# Patient Record
Sex: FEMALE | Race: WHITE | Employment: OTHER
[De-identification: names, ages, dates, MRNs, and addresses within clinical notes are randomized per-mention and may not be internally consistent; named-entity substitution may affect disease eponyms.]

---

## 2017-01-04 RX ORDER — HYDROCODONE BITARTRATE AND ACETAMINOPHEN 5; 325 MG/1; MG/1
1 TABLET ORAL EVERY 6 HOURS PRN
Qty: 40 TABLET | Refills: 0 | Status: SHIPPED | OUTPATIENT
Start: 2017-01-04 | End: 2017-01-17 | Stop reason: SDUPTHER

## 2017-01-05 ENCOUNTER — CARE COORDINATION (OUTPATIENT)
Dept: CASE MANAGEMENT | Age: 52
End: 2017-01-05

## 2017-01-12 ENCOUNTER — CARE COORDINATION (OUTPATIENT)
Dept: CASE MANAGEMENT | Age: 52
End: 2017-01-12

## 2017-01-17 RX ORDER — HYDROCODONE BITARTRATE AND ACETAMINOPHEN 5; 325 MG/1; MG/1
1 TABLET ORAL EVERY 6 HOURS PRN
Qty: 40 TABLET | Refills: 0 | Status: SHIPPED | OUTPATIENT
Start: 2017-01-17 | End: 2017-01-24

## 2017-01-18 ENCOUNTER — CARE COORDINATION (OUTPATIENT)
Dept: CASE MANAGEMENT | Age: 52
End: 2017-01-18

## 2017-01-26 ENCOUNTER — CARE COORDINATION (OUTPATIENT)
Dept: CASE MANAGEMENT | Age: 52
End: 2017-01-26

## 2017-02-02 ENCOUNTER — CARE COORDINATION (OUTPATIENT)
Dept: CASE MANAGEMENT | Age: 52
End: 2017-02-02

## 2017-02-08 ENCOUNTER — TELEPHONE (OUTPATIENT)
Dept: ORTHOPEDIC SURGERY | Age: 52
End: 2017-02-08

## 2017-02-09 ENCOUNTER — OFFICE VISIT (OUTPATIENT)
Dept: ORTHOPEDIC SURGERY | Age: 52
End: 2017-02-09

## 2017-02-09 VITALS
SYSTOLIC BLOOD PRESSURE: 130 MMHG | HEIGHT: 64 IN | HEART RATE: 78 BPM | BODY MASS INDEX: 36.55 KG/M2 | DIASTOLIC BLOOD PRESSURE: 78 MMHG | WEIGHT: 214.07 LBS

## 2017-02-09 DIAGNOSIS — M25.561 ACUTE PAIN OF RIGHT KNEE: ICD-10-CM

## 2017-02-09 DIAGNOSIS — Z96.651 S/P RIGHT UNICOMPARTMENTAL KNEE REPLACEMENT: Primary | ICD-10-CM

## 2017-02-09 PROCEDURE — 99024 POSTOP FOLLOW-UP VISIT: CPT | Performed by: ORTHOPAEDIC SURGERY

## 2017-02-09 PROCEDURE — 73560 X-RAY EXAM OF KNEE 1 OR 2: CPT | Performed by: ORTHOPAEDIC SURGERY

## 2017-02-09 RX ORDER — LITHIUM CARBONATE 300 MG/1
300 CAPSULE ORAL 2 TIMES DAILY WITH MEALS
COMMUNITY
Start: 2017-02-02 | End: 2017-10-19

## 2017-02-09 RX ORDER — OLANZAPINE 20 MG/1
TABLET ORAL NIGHTLY
COMMUNITY
Start: 2017-02-02

## 2017-02-09 RX ORDER — ONDANSETRON 4 MG/1
TABLET, FILM COATED ORAL
COMMUNITY
Start: 2017-01-17 | End: 2017-02-13

## 2017-02-09 RX ORDER — GABAPENTIN 400 MG/1
800 CAPSULE ORAL 3 TIMES DAILY
COMMUNITY
Start: 2017-02-02

## 2017-02-09 RX ORDER — OXYCODONE HYDROCHLORIDE AND ACETAMINOPHEN 5; 325 MG/1; MG/1
1 TABLET ORAL EVERY 6 HOURS PRN
Qty: 40 TABLET | Refills: 0 | Status: SHIPPED | OUTPATIENT
Start: 2017-02-09 | End: 2017-02-16

## 2017-02-10 ENCOUNTER — TELEPHONE (OUTPATIENT)
Dept: ORTHOPEDIC SURGERY | Age: 52
End: 2017-02-10

## 2017-02-13 ENCOUNTER — CARE COORDINATION (OUTPATIENT)
Dept: CASE MANAGEMENT | Age: 52
End: 2017-02-13

## 2017-02-21 ENCOUNTER — CARE COORDINATION (OUTPATIENT)
Dept: CASE MANAGEMENT | Age: 52
End: 2017-02-21

## 2017-02-21 DIAGNOSIS — Z96.651 STATUS POST RIGHT PARTIAL KNEE REPLACEMENT: ICD-10-CM

## 2017-02-23 ENCOUNTER — CARE COORDINATION (OUTPATIENT)
Dept: CASE MANAGEMENT | Age: 52
End: 2017-02-23

## 2017-02-23 DIAGNOSIS — Z96.651 STATUS POST RIGHT PARTIAL KNEE REPLACEMENT: ICD-10-CM

## 2017-02-27 ENCOUNTER — CARE COORDINATION (OUTPATIENT)
Dept: CASE MANAGEMENT | Age: 52
End: 2017-02-27

## 2017-02-27 DIAGNOSIS — Z96.651 STATUS POST RIGHT PARTIAL KNEE REPLACEMENT: ICD-10-CM

## 2017-02-27 RX ORDER — OXYCODONE HYDROCHLORIDE 5 MG/1
TABLET ORAL
Qty: 40 TABLET | Refills: 0 | Status: SHIPPED | OUTPATIENT
Start: 2017-02-27 | End: 2017-03-16 | Stop reason: SDUPTHER

## 2017-03-07 ENCOUNTER — HOSPITAL ENCOUNTER (OUTPATIENT)
Dept: PHYSICAL THERAPY | Age: 52
Discharge: HOME | End: 2017-02-28
Admitting: ORTHOPAEDIC SURGERY

## 2017-03-07 ENCOUNTER — OFFICE VISIT (OUTPATIENT)
Dept: ORTHOPEDIC SURGERY | Age: 52
End: 2017-03-07

## 2017-03-07 VITALS
SYSTOLIC BLOOD PRESSURE: 129 MMHG | WEIGHT: 213 LBS | HEART RATE: 83 BPM | BODY MASS INDEX: 36.37 KG/M2 | HEIGHT: 64 IN | DIASTOLIC BLOOD PRESSURE: 77 MMHG

## 2017-03-07 DIAGNOSIS — Z96.651 STATUS POST RIGHT PARTIAL KNEE REPLACEMENT: Primary | ICD-10-CM

## 2017-03-07 DIAGNOSIS — Z98.890 S/P RIGHT KNEE ARTHROSCOPY: ICD-10-CM

## 2017-03-07 PROCEDURE — 99024 POSTOP FOLLOW-UP VISIT: CPT | Performed by: ORTHOPAEDIC SURGERY

## 2017-03-07 RX ORDER — OXYCODONE HYDROCHLORIDE AND ACETAMINOPHEN 5; 325 MG/1; MG/1
1 TABLET ORAL EVERY 6 HOURS PRN
Qty: 40 TABLET | Refills: 0 | Status: SHIPPED | OUTPATIENT
Start: 2017-03-07 | End: 2017-11-02 | Stop reason: SDUPTHER

## 2017-03-16 DIAGNOSIS — Z96.651 STATUS POST RIGHT PARTIAL KNEE REPLACEMENT: Primary | ICD-10-CM

## 2017-03-16 RX ORDER — OXYCODONE HYDROCHLORIDE 5 MG/1
TABLET ORAL
Qty: 40 TABLET | Refills: 0 | Status: SHIPPED | OUTPATIENT
Start: 2017-03-16 | End: 2017-10-19

## 2017-03-27 DIAGNOSIS — Z96.651 STATUS POST RIGHT PARTIAL KNEE REPLACEMENT: Primary | ICD-10-CM

## 2017-03-27 RX ORDER — HYDROCODONE BITARTRATE AND ACETAMINOPHEN 5; 325 MG/1; MG/1
1 TABLET ORAL EVERY 6 HOURS PRN
Qty: 40 TABLET | Refills: 0 | Status: CANCELLED | OUTPATIENT
Start: 2017-03-27 | End: 2017-04-03

## 2017-03-27 RX ORDER — TRAMADOL HYDROCHLORIDE 50 MG/1
50 TABLET ORAL EVERY 6 HOURS PRN
Qty: 40 TABLET | Refills: 0 | Status: SHIPPED | OUTPATIENT
Start: 2017-03-27 | End: 2017-04-06

## 2017-05-09 ENCOUNTER — OFFICE VISIT (OUTPATIENT)
Dept: ORTHOPEDIC SURGERY | Age: 52
End: 2017-05-09

## 2017-05-09 VITALS
SYSTOLIC BLOOD PRESSURE: 135 MMHG | WEIGHT: 212.96 LBS | HEIGHT: 64 IN | DIASTOLIC BLOOD PRESSURE: 92 MMHG | HEART RATE: 92 BPM | BODY MASS INDEX: 36.36 KG/M2

## 2017-05-09 DIAGNOSIS — Z96.651 STATUS POST RIGHT PARTIAL KNEE REPLACEMENT: Primary | ICD-10-CM

## 2017-05-09 PROCEDURE — 99024 POSTOP FOLLOW-UP VISIT: CPT | Performed by: ORTHOPAEDIC SURGERY

## 2017-05-09 PROCEDURE — E0100 CANE ADJUST/FIXED WITH TIP: HCPCS | Performed by: ORTHOPAEDIC SURGERY

## 2017-09-01 ENCOUNTER — TELEPHONE (OUTPATIENT)
Dept: ORTHOPEDIC SURGERY | Age: 52
End: 2017-09-01

## 2017-09-12 ENCOUNTER — OFFICE VISIT (OUTPATIENT)
Dept: ORTHOPEDIC SURGERY | Age: 52
End: 2017-09-12

## 2017-09-12 VITALS
HEIGHT: 64 IN | WEIGHT: 213 LBS | DIASTOLIC BLOOD PRESSURE: 94 MMHG | HEART RATE: 94 BPM | SYSTOLIC BLOOD PRESSURE: 143 MMHG | BODY MASS INDEX: 36.37 KG/M2

## 2017-09-12 DIAGNOSIS — Z96.651 STATUS POST RIGHT PARTIAL KNEE REPLACEMENT: Primary | ICD-10-CM

## 2017-09-12 DIAGNOSIS — M25.562 LEFT KNEE PAIN, UNSPECIFIED CHRONICITY: ICD-10-CM

## 2017-09-12 PROCEDURE — 73564 X-RAY EXAM KNEE 4 OR MORE: CPT | Performed by: ORTHOPAEDIC SURGERY

## 2017-09-12 PROCEDURE — 99214 OFFICE O/P EST MOD 30 MIN: CPT | Performed by: ORTHOPAEDIC SURGERY

## 2017-09-12 PROCEDURE — G8427 DOCREV CUR MEDS BY ELIG CLIN: HCPCS | Performed by: ORTHOPAEDIC SURGERY

## 2017-09-12 PROCEDURE — 1036F TOBACCO NON-USER: CPT | Performed by: ORTHOPAEDIC SURGERY

## 2017-09-12 PROCEDURE — G8417 CALC BMI ABV UP PARAM F/U: HCPCS | Performed by: ORTHOPAEDIC SURGERY

## 2017-09-12 PROCEDURE — 3017F COLORECTAL CA SCREEN DOC REV: CPT | Performed by: ORTHOPAEDIC SURGERY

## 2017-09-21 DIAGNOSIS — M17.12 PRIMARY OSTEOARTHRITIS OF LEFT KNEE: Primary | ICD-10-CM

## 2017-09-22 ENCOUNTER — TELEPHONE (OUTPATIENT)
Dept: ORTHOPEDIC SURGERY | Age: 52
End: 2017-09-22

## 2017-10-04 ENCOUNTER — HOSPITAL ENCOUNTER (OUTPATIENT)
Dept: CT IMAGING | Age: 52
Discharge: HOME | End: 2017-10-04
Attending: ORTHOPAEDIC SURGERY | Admitting: ORTHOPAEDIC SURGERY

## 2017-10-04 DIAGNOSIS — M17.12 UNILATERAL PRIMARY OSTEOARTHRITIS, LEFT KNEE: ICD-10-CM

## 2017-10-04 DIAGNOSIS — M17.12 PRIMARY OSTEOARTHRITIS OF LEFT KNEE: ICD-10-CM

## 2017-10-27 PROBLEM — K21.9 GERD (GASTROESOPHAGEAL REFLUX DISEASE): Status: ACTIVE | Noted: 2017-10-27

## 2017-10-27 PROBLEM — Z96.652 STATUS POST LEFT PARTIAL KNEE REPLACEMENT: Status: ACTIVE | Noted: 2017-10-27

## 2017-10-29 ENCOUNTER — CARE COORDINATION (OUTPATIENT)
Dept: CASE MANAGEMENT | Age: 52
End: 2017-10-29

## 2017-11-02 DIAGNOSIS — M17.12 PRIMARY OSTEOARTHRITIS OF LEFT KNEE: Primary | ICD-10-CM

## 2017-11-02 RX ORDER — OXYCODONE HYDROCHLORIDE AND ACETAMINOPHEN 5; 325 MG/1; MG/1
1 TABLET ORAL EVERY 6 HOURS PRN
Qty: 28 TABLET | Refills: 0 | Status: SHIPPED | OUTPATIENT
Start: 2017-11-02 | End: 2017-11-09

## 2017-11-03 ENCOUNTER — CARE COORDINATION (OUTPATIENT)
Dept: CASE MANAGEMENT | Age: 52
End: 2017-11-03

## 2017-11-03 NOTE — CARE COORDINATION
Spoke with patient's spouse, states that she just left    Incision status: States no issues with incision that he is aware of     Edema/Swelling: States no issues with swelling    Pain level and status: States she is still having pain    Use of pain medications: States she is taking pain meds    Bowels: NA    Home Care Agency active: Continues    Outpatient therapy: No    Do you have all of your medications: Unsure    Changes in medications: NA    Follow up appointments:    Future Appointments  Date Time Provider Jorge Dumont   11/14/2017 10:50 AM Sherlyn Chavez, DO WELL FERNANDO King BSN  Care Transition Coordinator for ortho bundle  [phone]

## 2017-11-07 ENCOUNTER — CARE COORDINATION (OUTPATIENT)
Dept: CASE MANAGEMENT | Age: 52
End: 2017-11-07

## 2017-11-07 NOTE — CARE COORDINATION
Spoke with patient    Incision status: States dressing dry and intact with no redness or drainage    Edema/Swelling: States swelling improving    Pain level and status: States pain is tolerable    Use of pain medications: States taking 1/2 pain tab before bed    Bowels: No complaints    Home Care Agency active: Continues with nursing, therapy     Outpatient therapy: No    Do you have all of your medications: Yes    Changes in medications: No    Follow up appointments:    Future Appointments  Date Time Provider Jorge Dumont   11/14/2017 10:50 AM Earline Cogan, DO WELL FERNANDO Herron BSN  Care Transition Coordinator for ortho bundle  [phone]

## 2017-11-09 ENCOUNTER — CARE COORDINATION (OUTPATIENT)
Dept: CASE MANAGEMENT | Age: 52
End: 2017-11-09

## 2017-11-10 NOTE — CARE COORDINATION
Spoke with patient    Incision status: States free from redness or drainage and dressing dry and intact    Edema/Swelling: States swelling present, continues to ice and improving    Pain level and status: States pain is tolerable, states not taking pain meds throughout the day    Use of pain medications: States taking pain meds before bedtime.     Bowels: No complaints    Home Care Agency active: Continues with nursing and therapy    Outpatient therapy: No    Do you have all of your medications: Yes    Changes in medications: No    Follow up appointments:    Future Appointments  Date Time Provider Jorge Dumont   11/14/2017 10:50 AM Brodie Bee DO WELL FERNANDO Dale BSN  Care Transition Coordinator for ortho bundle  [phone]

## 2017-11-13 ENCOUNTER — CARE COORDINATION (OUTPATIENT)
Dept: CASE MANAGEMENT | Age: 52
End: 2017-11-13

## 2017-11-14 ENCOUNTER — OFFICE VISIT (OUTPATIENT)
Dept: ORTHOPEDIC SURGERY | Age: 52
End: 2017-11-14

## 2017-11-14 DIAGNOSIS — Z96.652 STATUS POST LEFT PARTIAL KNEE REPLACEMENT: ICD-10-CM

## 2017-11-14 DIAGNOSIS — M25.562 LEFT KNEE PAIN, UNSPECIFIED CHRONICITY: Primary | ICD-10-CM

## 2017-11-14 PROCEDURE — 73560 X-RAY EXAM OF KNEE 1 OR 2: CPT | Performed by: ORTHOPAEDIC SURGERY

## 2017-11-14 PROCEDURE — 99024 POSTOP FOLLOW-UP VISIT: CPT | Performed by: ORTHOPAEDIC SURGERY

## 2017-11-14 RX ORDER — OXYCODONE HYDROCHLORIDE AND ACETAMINOPHEN 5; 325 MG/1; MG/1
1 TABLET ORAL EVERY 6 HOURS PRN
Qty: 28 TABLET | Refills: 0 | Status: SHIPPED | OUTPATIENT
Start: 2017-11-14 | End: 2017-11-21

## 2017-11-14 NOTE — PROGRESS NOTES
out patient PT  Pain medication was refilled as needed. Postoperative Lovenox was not approved for DVT prophylaxis, she will continue to take aspirin daily for 1 month. Follow-up in 4 weeks      This dictation was performed with a verbal recognition program (DRAGON) and it was checked for errors. It is possible that there are still dictated errors within this office note. If so, please bring any errors to my attention for an addendum. All efforts were made to ensure that this office note is accurate.

## 2017-11-15 DIAGNOSIS — Z96.652 STATUS POST LEFT PARTIAL KNEE REPLACEMENT: Primary | ICD-10-CM

## 2017-11-20 ENCOUNTER — CARE COORDINATION (OUTPATIENT)
Dept: CASE MANAGEMENT | Age: 52
End: 2017-11-20

## 2017-11-27 ENCOUNTER — CARE COORDINATION (OUTPATIENT)
Dept: CASE MANAGEMENT | Age: 52
End: 2017-11-27

## 2017-11-27 NOTE — CARE COORDINATION
Spoke with patient    Incision status: States free from redness or drainage    Edema/Swelling: States swelling improving    Pain level and status: States not having any pain. Use of pain medications: States not taking pain meds    Bowels: No complaints    Home Care Agency active: No    Outpatient therapy: Unable to get transportation to, keeps having difficulty with them not showing up to pick her up. Continues with [facility].     Do you have all of your medications: Yes    Changes in medications: No    Follow up appointments:    Future Appointments  Date Time Provider Jorge Dumont   12/12/2017 10:20 AM Yuki Hernandez DO WELL FERNANDO Shah BSN  Care Transition Coordinator for ortho bundle  [phone]

## 2017-12-04 ENCOUNTER — CARE COORDINATION (OUTPATIENT)
Dept: CASE MANAGEMENT | Age: 52
End: 2017-12-04

## 2017-12-04 NOTE — CARE COORDINATION
Called patient, spouse answered. States that patient is not home, and patient is doing well. Spouse states he believes she has transportation and is going to start rehab on Friday. Contact information left for patient to call for complications/concerns.   Raya MARIEN  Care Transition Coordinator for ortho bundle  [phone]

## 2017-12-11 ENCOUNTER — CARE COORDINATION (OUTPATIENT)
Dept: CASE MANAGEMENT | Age: 52
End: 2017-12-11

## 2017-12-11 NOTE — CARE COORDINATION
Spoke with patient's spouse    Incision status: States free from redness or drainage. Edema/Swelling: States swelling has improved. Pain level and status: States pain is tolerable. Use of pain medications: States not taking pain meds    Bowels: NA    Home Care Agency active: No    Outpatient therapy: Has not made it to appt because of transportation.     Do you have all of your medications: Yes    Changes in medications: Na    Follow up appointments:    Future Appointments  Date Time Provider Jorge Dumont   12/12/2017 10:20 AM Earline Cogan,  WELL FERNANDO Herron BSN  Care Transition Coordinator for ortho bundle  [phone]

## 2017-12-12 ENCOUNTER — OFFICE VISIT (OUTPATIENT)
Dept: ORTHOPEDIC SURGERY | Age: 52
End: 2017-12-12

## 2017-12-12 DIAGNOSIS — Z96.652 STATUS POST LEFT PARTIAL KNEE REPLACEMENT: Primary | ICD-10-CM

## 2017-12-12 PROCEDURE — 99024 POSTOP FOLLOW-UP VISIT: CPT | Performed by: ORTHOPAEDIC SURGERY

## 2017-12-12 NOTE — PROGRESS NOTES
Chief Complaint:  Post-Op Check (LEFT PARTIAL KNEE REPLACEMENT 10/27/17)      SUBJECTIVE:  Gertrude Carr is a 47 y.o. female who returns today s/p left knee status post Medial unicompartmental knee arthroplasty. (Makoplasty). She is doing very well, she has 0 out of 10 pain, she has been cleared by physical therapy, she has no complaints at this time. Date of surgery: 10/27/17    Pain Assessment:  Pain Assessment  Location of Pain: Knee  Location Modifiers: Left  Severity of Pain: 0      OBJECTIVE:  Vital Signs: There were no vitals filed for this visit. Appearance: alert, well appearing, and in no distress, oriented to person, place, and time and normal appearing weight. Physical exam:   Incisions are well-healed, she is distally neurovascular intact. Knee is stable throughout range of motion, MCL, ACL, LCL are intact. She has range of motion 0-125°, no effusion, no erythema. Able to do a straight leg raise      Assessment :  Status post left medial compartment knee replacement    Impression:  Encounter Diagnosis   Name Primary?  Status post left partial knee replacement Yes       Office Procedures:  No orders of the defined types were placed in this encounter. No orders of the defined types were placed in this encounter. Treatment Plan:  She will transition to a home exercise program, she has been cleared by formal physical therapy. She can slowly resume all activities as tolerated. Follow up in 2 months for AP and lateral x-ray of the right and left knee. She is also one year status post right partial knee replacement, we will get her annual x-rays of the right knee at her next visit along with the left.     Barkley Bloch

## 2017-12-18 ENCOUNTER — CARE COORDINATION (OUTPATIENT)
Dept: CASE MANAGEMENT | Age: 52
End: 2017-12-18

## 2017-12-18 NOTE — CARE COORDINATION
Spoke with patient    Incision status: States free from redness or drainage. Edema/Swelling: States swelling is almost gone    Pain level and status: States not having any pain    Use of pain medications: States not taking pain meds. Bowels: No complaints    Home Care Agency active: No    Outpatient therapy: Completed last week.     Do you have all of your medications: Yes    Changes in medications: No    Follow up appointments:    Future Appointments  Date Time Provider Jorge Dumont   2/13/2018 9:20 AM DO MCKENZIE Cano BSN  Care Transition Coordinator for ortho bundle  [phone]

## 2018-01-05 ENCOUNTER — CARE COORDINATION (OUTPATIENT)
Dept: CASE MANAGEMENT | Age: 53
End: 2018-01-05

## 2018-01-05 NOTE — CARE COORDINATION
Spoke with patient    Incision status: States free from redness or drainage. Edema/Swelling: states swelling has improved and minimal    Pain level and status: States pain is more soreness with cold weather, but doing well. Use of pain medications: States not taking pain meds. Bowels: No complaints.     Home Care Agency active: No    Outpatient therapy: Discharged 12/13/17     Do you have all of your medications: Yes    Changes in medications: No    Follow up appointments:    Future Appointments  Date Time Provider Jorge Dumont   2/13/2018 9:20 AM Sarah Salguero DO WELL FERNANDO Abarca BSN  Care Transition Coordinator for ortho bundle  [phone]

## 2018-01-17 ENCOUNTER — CARE COORDINATION (OUTPATIENT)
Dept: CASE MANAGEMENT | Age: 53
End: 2018-01-17

## 2018-02-13 ENCOUNTER — OFFICE VISIT (OUTPATIENT)
Dept: ORTHOPEDIC SURGERY | Age: 53
End: 2018-02-13

## 2018-02-13 VITALS
HEART RATE: 81 BPM | WEIGHT: 202 LBS | HEIGHT: 63 IN | DIASTOLIC BLOOD PRESSURE: 79 MMHG | BODY MASS INDEX: 35.79 KG/M2 | SYSTOLIC BLOOD PRESSURE: 117 MMHG

## 2018-02-13 DIAGNOSIS — M25.562 LEFT KNEE PAIN, UNSPECIFIED CHRONICITY: Primary | ICD-10-CM

## 2018-02-13 DIAGNOSIS — Z96.652 STATUS POST LEFT PARTIAL KNEE REPLACEMENT: ICD-10-CM

## 2018-02-13 PROCEDURE — 3017F COLORECTAL CA SCREEN DOC REV: CPT | Performed by: ORTHOPAEDIC SURGERY

## 2018-02-13 PROCEDURE — 3014F SCREEN MAMMO DOC REV: CPT | Performed by: ORTHOPAEDIC SURGERY

## 2018-02-13 PROCEDURE — 99213 OFFICE O/P EST LOW 20 MIN: CPT | Performed by: ORTHOPAEDIC SURGERY

## 2018-02-13 PROCEDURE — G8484 FLU IMMUNIZE NO ADMIN: HCPCS | Performed by: ORTHOPAEDIC SURGERY

## 2018-02-13 PROCEDURE — 1036F TOBACCO NON-USER: CPT | Performed by: ORTHOPAEDIC SURGERY

## 2018-02-13 PROCEDURE — G8427 DOCREV CUR MEDS BY ELIG CLIN: HCPCS | Performed by: ORTHOPAEDIC SURGERY

## 2018-02-13 PROCEDURE — G8417 CALC BMI ABV UP PARAM F/U: HCPCS | Performed by: ORTHOPAEDIC SURGERY

## 2018-02-13 RX ORDER — ACETAMINOPHEN 325 MG/1
650 TABLET ORAL EVERY 6 HOURS PRN
Qty: 120 TABLET | Refills: 3 | Status: ON HOLD | OUTPATIENT
Start: 2018-02-13 | End: 2021-02-12 | Stop reason: HOSPADM

## 2018-02-13 NOTE — PROGRESS NOTES
uni-arthroplasty with no evidence of loosening, osteolysis, fracture or dislocation. Assessment :  Left knee sprain status post left medial compartment arthroplasty, status post right medial compartment arthroplasty    Impression:  Encounter Diagnoses   Name Primary?  Left knee pain, unspecified chronicity Yes    Status post left partial knee replacement        Office Procedures:  Orders Placed This Encounter   Procedures    XR KNEE LEFT (1-2 VIEWS)     No orders of the defined types were placed in this encounter. Treatment Plan:  Continue home exercise program, patient requested a prescription for Tylenol to help with pain, I also recommended over-the-counter anti-inflammatories, provided patient with NSAID cream to apply to knee. Continue ambulating with a cane until pain has improved in gait has normalized. Anticipate full recovery with time, anti-inflammatories and physical therapy. Follow up 1-2 months for reevaluation. Patient agrees with this plan, all of their questions were answered best of our ability and to their satisfaction.         Rolf Becker

## 2018-12-13 ENCOUNTER — HOSPITAL ENCOUNTER (OUTPATIENT)
Dept: MAMMOGRAPHY | Age: 53
Discharge: HOME | End: 2018-12-18
Payer: MEDICARE

## 2018-12-13 DIAGNOSIS — Z12.31 VISIT FOR SCREENING MAMMOGRAM: ICD-10-CM

## 2018-12-13 PROCEDURE — 77067 SCR MAMMO BI INCL CAD: CPT

## 2020-05-07 ENCOUNTER — VIRTUAL VISIT (OUTPATIENT)
Dept: FAMILY MEDICINE CLINIC | Age: 55
End: 2020-05-07
Payer: MEDICARE

## 2020-05-07 VITALS — BODY MASS INDEX: 32.77 KG/M2 | WEIGHT: 185 LBS

## 2020-05-07 PROBLEM — Z96.652 STATUS POST LEFT PARTIAL KNEE REPLACEMENT: Status: RESOLVED | Noted: 2017-10-27 | Resolved: 2020-05-07

## 2020-05-07 PROCEDURE — 99442 PR PHYS/QHP TELEPHONE EVALUATION 11-20 MIN: CPT | Performed by: NURSE PRACTITIONER

## 2020-05-07 RX ORDER — PRAMIPEXOLE DIHYDROCHLORIDE 0.25 MG/1
0.25 TABLET ORAL 3 TIMES DAILY
COMMUNITY
End: 2021-01-06 | Stop reason: SDUPTHER

## 2020-05-07 RX ORDER — ASPIRIN 81 MG/1
81 TABLET ORAL DAILY
COMMUNITY
End: 2022-06-21 | Stop reason: ALTCHOICE

## 2020-05-07 RX ORDER — LAMOTRIGINE 100 MG/1
TABLET ORAL
COMMUNITY
Start: 2020-04-23

## 2020-05-07 RX ORDER — MILNACIPRAN HYDROCHLORIDE 25 MG/1
50 TABLET, FILM COATED ORAL 2 TIMES DAILY
COMMUNITY
End: 2020-11-05 | Stop reason: SDUPTHER

## 2020-05-07 NOTE — PROGRESS NOTES
a daily basis and doing well. No adverse side effects. Continue with current therapy and follow-up in 6 months. 3.  Bipolar affective disorder  Stable. Patient is managed by psychiatry Dr. Carroll Landon at [address] [facility]. Patient reports that she is compliant with medications and receiving treatment. Patient denies any suicidal homicidal ideations or thoughts at this time. Patient reports taking Zyprexa and Zoloft as well as Lamictal.  Patient denies any missed doses. Patient denies adverse side effects. Continue with current therapy and psychiatry management. 4.  Fibromyalgia  Controlled. Patient compliant with Mirapex, Savella, and gabapentin. Patient reports that psychiatry has previously prescribed her gabapentin and wanted to discuss me prescribing medication from now on. I did inform patient that I would talk with Dr. Carroll Landon and come up with a plan regarding previous prescribing of the gabapentin. Patient reports that she is exercising more and that is helping as well. Patient verbalized and acknowledges. Continue with current therapy. 5.  Hyperlipidemia  Controlled. Patient has history reported of hyperlipidemia but not on any prescribed medications at this time. Will check lipid level and reevaluate. Patient instructed on low saturated high-fiber diet. Patient reports that she does take co-Q10 and over-the-counter vitamin supplements. 6.  Schizophrenia  Controlled. Patient compliant with medications and is managed by psychiatry Dr. Carroll Landon at [address] [facility]. Continue with current therapy and patient seeing psychiatry monthly. 7.  Asthma/COPD  Controlled. Patient denies any recent episodes of asthma or COPD exacerbations. Patient reports that occasionally seasonal allergies will aggravate her symptoms but she is doing well while taking Singulair and using her inhaler as needed. Patient was a former smoker but quit in 2006.   Continue with current therapy and follow-up in 6 months. I affirm this is a Patient Initiated Episode with a Patient who has not had a related appointment within my department in the past 7 days or scheduled within the next 24 hours.     Patient identification was verified at the start of the visit: Yes    Total Time: minutes: 11-20 minutes    Note: not billable if this call serves to triage the patient into an appointment for the relevant concern      Murphy Armas

## 2020-05-19 ENCOUNTER — TELEPHONE (OUTPATIENT)
Dept: FAMILY MEDICINE CLINIC | Age: 55
End: 2020-05-19

## 2020-05-19 RX ORDER — MONTELUKAST SODIUM 10 MG/1
10 TABLET ORAL NIGHTLY
Qty: 30 TABLET | Refills: 1 | Status: SHIPPED | OUTPATIENT
Start: 2020-05-19 | End: 2021-01-06 | Stop reason: SDUPTHER

## 2020-05-19 RX ORDER — ONDANSETRON 4 MG/1
4 TABLET, FILM COATED ORAL EVERY 8 HOURS PRN
Qty: 30 TABLET | Refills: 0 | Status: SHIPPED | OUTPATIENT
Start: 2020-05-19 | End: 2020-08-10

## 2020-05-19 NOTE — TELEPHONE ENCOUNTER
Patient notified that the [facility] will continue to prescribe the Gabapentin for her

## 2020-06-12 ENCOUNTER — NURSE ONLY (OUTPATIENT)
Dept: FAMILY MEDICINE CLINIC | Age: 55
End: 2020-06-12
Payer: MEDICARE

## 2020-06-12 LAB
A/G RATIO: 2.2 (ref 1.1–2.2)
ALBUMIN SERPL-MCNC: 4.8 G/DL (ref 3.4–5)
ALP BLD-CCNC: 96 U/L (ref 40–129)
ALT SERPL-CCNC: 20 U/L (ref 10–40)
ANION GAP SERPL CALCULATED.3IONS-SCNC: 11 MMOL/L (ref 3–16)
AST SERPL-CCNC: 21 U/L (ref 15–37)
BASOPHILS ABSOLUTE: 0.1 K/UL (ref 0–0.2)
BASOPHILS RELATIVE PERCENT: 0.8 %
BILIRUB SERPL-MCNC: <0.2 MG/DL (ref 0–1)
BUN BLDV-MCNC: 9 MG/DL (ref 7–20)
CALCIUM SERPL-MCNC: 9.8 MG/DL (ref 8.3–10.6)
CHLORIDE BLD-SCNC: 101 MMOL/L (ref 99–110)
CHOLESTEROL, FASTING: 250 MG/DL (ref 0–199)
CO2: 27 MMOL/L (ref 21–32)
CREAT SERPL-MCNC: 0.8 MG/DL (ref 0.6–1.1)
EOSINOPHILS ABSOLUTE: 0.1 K/UL (ref 0–0.6)
EOSINOPHILS RELATIVE PERCENT: 1.5 %
GFR AFRICAN AMERICAN: >60
GFR NON-AFRICAN AMERICAN: >60
GLOBULIN: 2.2 G/DL
GLUCOSE BLD-MCNC: 104 MG/DL (ref 70–99)
HCT VFR BLD CALC: 43.7 % (ref 36–48)
HDLC SERPL-MCNC: 62 MG/DL (ref 40–60)
HEMOGLOBIN: 14.3 G/DL (ref 12–16)
LDL CHOLESTEROL CALCULATED: 155 MG/DL
LYMPHOCYTES ABSOLUTE: 2.1 K/UL (ref 1–5.1)
LYMPHOCYTES RELATIVE PERCENT: 25.7 %
MCH RBC QN AUTO: 32.5 PG (ref 26–34)
MCHC RBC AUTO-ENTMCNC: 32.7 G/DL (ref 31–36)
MCV RBC AUTO: 99.4 FL (ref 80–100)
MONOCYTES ABSOLUTE: 0.7 K/UL (ref 0–1.3)
MONOCYTES RELATIVE PERCENT: 8.2 %
NEUTROPHILS ABSOLUTE: 5.2 K/UL (ref 1.7–7.7)
NEUTROPHILS RELATIVE PERCENT: 63.8 %
PDW BLD-RTO: 13.8 % (ref 12.4–15.4)
PLATELET # BLD: 279 K/UL (ref 135–450)
PMV BLD AUTO: 9.1 FL (ref 5–10.5)
POTASSIUM SERPL-SCNC: 4.7 MMOL/L (ref 3.5–5.1)
RBC # BLD: 4.4 M/UL (ref 4–5.2)
SODIUM BLD-SCNC: 139 MMOL/L (ref 136–145)
TOTAL PROTEIN: 7 G/DL (ref 6.4–8.2)
TRIGLYCERIDE, FASTING: 166 MG/DL (ref 0–150)
VLDLC SERPL CALC-MCNC: 33 MG/DL
WBC # BLD: 8.1 K/UL (ref 4–11)

## 2020-06-12 PROCEDURE — 36415 COLL VENOUS BLD VENIPUNCTURE: CPT | Performed by: NURSE PRACTITIONER

## 2020-07-16 ENCOUNTER — NURSE TRIAGE (OUTPATIENT)
Dept: OTHER | Facility: CLINIC | Age: 55
End: 2020-07-16

## 2020-07-16 NOTE — TELEPHONE ENCOUNTER
Reason for Disposition   Entire foot is cool or blue in comparison to other side    Answer Assessment - Initial Assessment Questions  1. ONSET: \"When did the pain start? \"       yesterday  2. LOCATION: \"Where is the pain located? \"       Left ankle/foot all the way up leg  3. PAIN: \"How bad is the pain? \"    (Scale 1-10; or mild, moderate, severe)   - MILD (1-3): doesn't interfere with normal activities    - MODERATE (4-7): interferes with normal activities (e.g., work or school) or awakens from sleep, limping    - SEVERE (8-10): excruciating pain, unable to do any normal activities, unable to walk       severe  4. WORK OR EXERCISE: \"Has there been any recent work or exercise that involved this part of the body? \"       no  5. CAUSE: \"What do you think is causing the ankle pain? \"      unknown  6. OTHER SYMPTOMS: \"Do you have any other symptoms? \" (e.g., calf pain, rash, fever, swelling)      Swelling, foot is reddish/blue  7. PREGNANCY: \"Is there any chance you are pregnant? \" \"When was your last menstrual period? \"      no    Protocols used: ANKLE PAIN-ADULT-[place]

## 2020-08-10 RX ORDER — ONDANSETRON 4 MG/1
TABLET, FILM COATED ORAL
Qty: 30 TABLET | Refills: 0 | Status: SHIPPED | OUTPATIENT
Start: 2020-08-10 | End: 2020-10-29

## 2020-08-10 NOTE — TELEPHONE ENCOUNTER
Future appt scheduled 11/05/2020        Last appt 05/07/2020    Last Written 05/19/2020    ondansetron (ZOFRAN) 4 MG tablet  #30  0 RF

## 2020-10-29 RX ORDER — ONDANSETRON 4 MG/1
TABLET, FILM COATED ORAL
Qty: 30 TABLET | Refills: 0 | Status: SHIPPED | OUTPATIENT
Start: 2020-10-29 | End: 2021-02-25 | Stop reason: SDUPTHER

## 2020-10-29 NOTE — TELEPHONE ENCOUNTER
Future appt scheduled 11/05/2020           Last appt 05/07/2020      Last Written 08/10/2020    ondansetron (ZOFRAN) 4 MG tablet  #30  0 Rf

## 2020-11-03 NOTE — PROGRESS NOTES
CHIEF COMPLAINT  Chief Complaint   Patient presents with   Tiffanie Guerra is a 48 y.o. female who presents to the office checkup. Patient reports doing well. Patient reports that she sees her psychiatrist every 3 months and her therapist on a monthly basis. Patient reports that she has had some changes in her medications over the past month and reports doing much better now. Patient denies any new unusual fatigue or unexplained weight loss. No chest pain, tightness, palpitations or shortness of breath. No abdominal pain or discomfort. No nausea, vomiting, or diarrhea. No changes in bowel or bladder habits. Patient reports that she did fall 2 to 3 weeks ago \"I was drunk and fell backwards. \"  Patient denies any head injury or loss of consciousness. Patient reports drinking a \"pint of vodka, 16 beers, and a bottle of wine. \"  Patient reports since then her lower back and tail bone have been hurting her. Patient reports that she also has right thumb pain over the past few months. Patient reports that thumb pain started prior to her fall. Patient reports taking over-the-counter medication with minimal relief. No other complaints, modifying factors or associated symptoms. Nursing notes reviewed.    Past Medical History:   Diagnosis Date    Asthma     Bipolar disorder     Carpal tunnel syndrome     COPD (chronic obstructive pulmonary disease) (HCC)     Fibromyalgia     GERD (gastroesophageal reflux disease)     Hyperlipidemia     Irritable bowel syndrome     Manic depression (HCC)     PONV (postoperative nausea and vomiting)     Schizophrenia      Past Surgical History:   Procedure Laterality Date    APPENDECTOMY      BACK SURGERY      DISCECTOMY L3    BREAST BIOPSY Right     LUMPECTOMY, BENIGN    CARPAL TUNNEL RELEASE Bilateral     MULTIPLE    HIP SURGERY Left     BONE SPUR    KNEE ARTHROCENTESIS Left 10/27/2017    left knee medial compartment arthroplasty  KNEE ARTHROPLASTY Right 2016    RIGHT PARTIAL KNEE ARTHROPLASTY    KNEE ARTHROPLASTY Right 2017    right Knee open medial compartment arthroplasty evaluation, irrigation and debridement     KNEE ARTHROSCOPY Left     OVARIAN CYST SURGERY      right     TONSILLECTOMY AND ADENOIDECTOMY      TUBAL LIGATION      R TUBELECTOMY      No family history on file. Social History     Socioeconomic History    Marital status:      Spouse name: Not on file    Number of children: Not on file    Years of education: Not on file    Highest education level: Not on file   Occupational History    Not on file   Social Needs    Financial resource strain: Not on file    Food insecurity     Worry: Not on file     Inability: Not on file    Transportation needs     Medical: Not on file     Non-medical: Not on file   Tobacco Use    Smoking status: Former Smoker     Packs/day: 0.50     Types: Cigarettes     Last attempt to quit: 10/17/2007     Years since quittin.0    Smokeless tobacco: Never Used   Substance and Sexual Activity    Alcohol use:  Yes    Drug use: No    Sexual activity: Yes     Partners: Male   Lifestyle    Physical activity     Days per week: Not on file     Minutes per session: Not on file    Stress: Not on file   Relationships    Social connections     Talks on phone: Not on file     Gets together: Not on file     Attends [employer] service: Not on file     Active member of club or organization: Not on file     Attends meetings of clubs or organizations: Not on file     Relationship status: Not on file    Intimate partner violence     Fear of current or ex partner: Not on file     Emotionally abused: Not on file     Physically abused: Not on file     Forced sexual activity: Not on file   Other Topics Concern    Not on file   Social History Narrative    Not on file     Current Outpatient Medications   Medication Sig Dispense Refill    prazosin (MINIPRESS) 1 MG capsule       Omega-3 Fatty Acids (FISH OIL PO) Take by mouth      buPROPion (WELLBUTRIN) 75 MG tablet Take 75 mg by mouth daily       hydrOXYzine (VISTARIL) 25 MG capsule Take 25 mg by mouth 2 times daily       linaclotide (LINZESS) 145 MCG capsule Take 1 capsule by mouth every morning (before breakfast) 30 capsule 1    milnacipran HCl (SAVELLA) 25 MG TABS Take 2 tablets by mouth 2 times daily 120 tablet 1    ondansetron (ZOFRAN) 4 MG tablet TAKE 1 TABLET BY MOUTH EVERY 8 HOURS AS NEEDED FOR NAUSEA AND VOMITING 30 tablet 0    montelukast (SINGULAIR) 10 MG tablet Take 1 tablet by mouth nightly 30 tablet 1    lamoTRIgine (LAMICTAL) 100 MG tablet TAKE 1 TABLET BY MOUTH TWICE A DAY      pramipexole (MIRAPEX) 0.25 MG tablet Take 0.25 mg by mouth 3 times daily      sertraline (ZOLOFT) 50 MG tablet Take 100 mg by mouth daily       aspirin 81 MG EC tablet Take 81 mg by mouth daily      Multiple Vitamin (MULTI-VITAMIN PO) Take by mouth      VITAMIN D PO Take by mouth      Ascorbic Acid (VITAMIN C PO) Take by mouth      Coenzyme Q10 (COQ10 PO) Take by mouth      acetaminophen (TYLENOL) 325 MG tablet Take 2 tablets by mouth every 6 hours as needed for Pain 120 tablet 3    gabapentin (NEURONTIN) 400 MG capsule Take 800 mg by mouth 3 times daily.  OLANZapine (ZYPREXA) 20 MG tablet nightly       albuterol (PROAIR HFA) 108 (90 BASE) MCG/ACT inhaler USE 1-2 PUFFS EVERY 4    HOURS AS NEEDED FORWHEEZING 2 Inhaler 5     No current facility-administered medications for this visit. Allergies   Allergen Reactions    Calamine Hives    Diphenhydramine Hcl Hives    Dust Mite Extract Other (See Comments)    Macadamia Nut Oil Other (See Comments)    Erythromycin Palpitations       REVIEW OF SYSTEMS  Review of Systems   Constitutional: Negative for activity change, appetite change, chills and fever. HENT: Negative for congestion, rhinorrhea and sore throat. Eyes: Negative for visual disturbance.    Respiratory: Negative for cough, shortness of breath and wheezing. Cardiovascular: Negative for chest pain and leg swelling. Gastrointestinal: Negative for abdominal pain, diarrhea, nausea and vomiting. Genitourinary: Negative for dysuria, frequency, hematuria and urgency. Musculoskeletal: Positive for arthralgias and back pain. Negative for gait problem and myalgias. Skin: Negative for rash. Neurological: Negative for dizziness, weakness, light-headedness, numbness and headaches. Psychiatric/Behavioral: Negative for self-injury, sleep disturbance and suicidal ideas. The patient is not nervous/anxious. PHYSICAL EXAM  /82   Pulse 91   Temp 98.4 °F (36.9 °C) (Oral)   Wt 200 lb (90.7 kg)   LMP 10/03/2017   SpO2 96%   BMI 35.43 kg/m²   Physical Exam  Vitals signs reviewed. Constitutional:       General: She is not in acute distress. Appearance: Normal appearance. She is well-developed. She is not diaphoretic. HENT:      Head: Normocephalic and atraumatic. Right Ear: Tympanic membrane normal.      Left Ear: Tympanic membrane normal.      Nose: Nose normal.      Mouth/Throat:      Mouth: Mucous membranes are moist.      Pharynx: Oropharynx is clear. Eyes:      General:         Right eye: No discharge. Left eye: No discharge. Pupils: Pupils are equal, round, and reactive to light. Neck:      Musculoskeletal: Normal range of motion and neck supple. Vascular: No JVD. Cardiovascular:      Rate and Rhythm: Normal rate and regular rhythm. Pulses: Normal pulses. Pulmonary:      Effort: Pulmonary effort is normal. No respiratory distress. Breath sounds: No stridor. No wheezing, rhonchi or rales. Chest:      Chest wall: No tenderness. Abdominal:      General: Bowel sounds are normal. There is no distension. Palpations: Abdomen is soft. Tenderness: There is no abdominal tenderness. There is no guarding. Musculoskeletal: Normal range of motion. General: No swelling or deformity. Lumbar back: She exhibits tenderness. She exhibits no swelling. Back:       Right hand: She exhibits normal capillary refill and no swelling. Normal sensation noted. Normal strength noted. Hands:    Skin:     General: Skin is warm and dry. Capillary Refill: Capillary refill takes less than 2 seconds. Coloration: Skin is not pale. Findings: No bruising, lesion or rash. Neurological:      Mental Status: She is alert and oriented to person, place, and time. Motor: No weakness. Psychiatric:         Mood and Affect: Mood normal.         Behavior: Behavior normal.        ASSESSMENT/PLAN:   1. Other obsessive-compulsive disorders  Stable. Managed by psychiatry. Patient sees Dr. David Fan at [place] [id number] every 3 months and her therapist Matthew Maria once a month. Patient reports taking medications as prescribed. Patient reports that she was recently put on bupropion, prazosin, and hydroxyzine which has helped with her anxiety and sleeping. Patient reports that she is doing much better and is able to work 5 hours a day which is helped as well. Patient reports that she is no longer having nightmares and feels that symptoms are well managed. Continue with current treatment and management per psychiatry follow-up for any new or worsening symptoms. Patient verbalized and acknowledges with plan of care at this time. Follow-up in 6 months. 2. Fibromyalgia  Stable. Patient reports taking Tylenol on a daily basis. Patient also reports taking vitamins/supplements to help as well. No new or worsening symptoms. Continue current treatment management. Healthy lifestyles such as diet and exercise discussed with patient. Follow-up in 6 months, sooner for new or worsening symptoms.     3. Schizophrenia, unspecified type ([facility] [address]  See above #1.    4. Bipolar affective disorder, remission status unspecified ([facility]  See above #1.    5. Hyperlipidemia, unspecified hyperlipidemia type  Stable. Previous lipid panel elevated. Patient encouraged to monitor saturated fats, increase fiber and continue taking omega-3 fish oil daily. Patient also encouraged on exercise and weight loss. Continue current treatment management follow-up in 6 months, sooner for new or worsening symptoms. 6. Other sleep apnea  Stable. Patient reports that she is sleeping well throughout the night now that she is on additional medication. Patient denies any excessive daytime fatigue or drowsiness. Patient does not wear CPAP. Continue with current management follow-up for any new or worsening symptoms. 7. Asthma, unspecified asthma severity, unspecified whether complicated, unspecified whether persistent  Stable. No recent exacerbations. Patient reports using albuterol inhaler as needed. Patient also takes Singulair on a nightly basis. Continue with current treatment management follow-up for any new or worsening symptoms. 8. Gastroesophageal reflux disease without esophagitis  Stable. Patient reports doing well. No recent episodes of dysphagia, nausea, vomiting, indigestion or chest burning. Patient instructed on foods to avoid that may trigger symptoms. Continue with current treatment management follow-up in 6 months, sooner for new or worsening symptoms. 9. Irritable bowel syndrome with constipation  Stable. Patient reports take medications as prescribed. No recent episodes of constipation, diarrhea, abdominal bloating or distention. No dark or tarry stools. Continue current treatment management follow-up in 6 months, sooner for new or worsening symptoms. - linaclotide (LINZESS) 145 MCG capsule; Take 1 capsule by mouth every morning (before breakfast)  Dispense: 30 capsule; Refill: 1  - milnacipran HCl (SAVELLA) 25 MG TABS; Take 2 tablets by mouth 2 times daily  Dispense: 120 tablet; Refill: 1    10.  Screening for cervical cancer  Patient given referral for gynecological exam.  Patient will call and schedule after she checks insurance for coverage. We will follow-up with results. - External Referral To Gynecology    11. Fall, initial encounter  Patient reports fall approximately 2 weeks ago. Patient reports that \"I got drunk after drinking a lot and fell backwards landing onto my butt. \"  Patient reports since then she has had lower back and tailbone pain. Patient reports that she has able to walk and still work. Risks of excessive alcohol intake discussed with patient. Patient acknowledges. Will obtain radiological imaging for further evaluation. Patient verbalized acknowledges with plan of care. Patient encouraged take ibuprofen or Tylenol for pain. - XR LUMBAR SPINE (2-3 VIEWS); Future  - XR SACRUM COCCYX (MIN 2 VIEWS); Future    12. Thumb pain, right  Patient reports clicking of right thumb for a few months now. Patient denies any initial injury or trauma. Patient is right-hand dominant. Patient reports that pain radiates up her arm at times. We did discuss thumb splint/wrist brace to help with symptoms. Imaging will be obtained and patient given referral to orthopedics. Patient verbalized acknowledges with plan of care. - XR HAND RIGHT (MIN 3 VIEWS); Future  - Jon Burciaga MD, Hand Surgery (Hand, Wrist, Elbow), [facility]       Shingles, flu, pneumonia vaccination offered and declined. The note was completed using Dragon voice recognition transcription. Every effort was made to ensure accuracy; however, inadvertent  transcription errors may be present despite my best efforts to edit errors.     JASON Obregon - CNP

## 2020-11-05 ENCOUNTER — OFFICE VISIT (OUTPATIENT)
Dept: FAMILY MEDICINE CLINIC | Age: 55
End: 2020-11-05
Payer: MEDICARE

## 2020-11-05 VITALS
TEMPERATURE: 98.4 F | BODY MASS INDEX: 35.43 KG/M2 | DIASTOLIC BLOOD PRESSURE: 82 MMHG | WEIGHT: 200 LBS | OXYGEN SATURATION: 96 % | SYSTOLIC BLOOD PRESSURE: 118 MMHG | HEART RATE: 91 BPM

## 2020-11-05 PROCEDURE — 99214 OFFICE O/P EST MOD 30 MIN: CPT | Performed by: NURSE PRACTITIONER

## 2020-11-05 RX ORDER — PRAZOSIN HYDROCHLORIDE 1 MG/1
CAPSULE ORAL
COMMUNITY
Start: 2020-10-22

## 2020-11-05 RX ORDER — BUPROPION HYDROCHLORIDE 75 MG/1
75 TABLET ORAL DAILY
COMMUNITY
Start: 2020-10-22

## 2020-11-05 RX ORDER — HYDROXYZINE PAMOATE 25 MG/1
25 CAPSULE ORAL 2 TIMES DAILY
COMMUNITY
Start: 2020-10-22 | End: 2022-06-21 | Stop reason: ALTCHOICE

## 2020-11-05 RX ORDER — MILNACIPRAN HYDROCHLORIDE 25 MG/1
50 TABLET, FILM COATED ORAL 2 TIMES DAILY
Qty: 120 TABLET | Refills: 1 | Status: SHIPPED | OUTPATIENT
Start: 2020-11-05 | End: 2021-02-02

## 2020-11-05 ASSESSMENT — ENCOUNTER SYMPTOMS
ABDOMINAL PAIN: 0
DIARRHEA: 0
VOMITING: 0
NAUSEA: 0
RHINORRHEA: 0
WHEEZING: 0
SORE THROAT: 0
BACK PAIN: 1
COUGH: 0
SHORTNESS OF BREATH: 0

## 2020-11-05 NOTE — PATIENT INSTRUCTIONS
Please read the healthy family handout that you were given and share it with your family. Please compare this printed medication list with your medications at home to be sure they are the same. If you have any medications that are different please contact us immediately at [phone]. Also review your allergies that we have listed, these may also include medications that you have not been able to tolerate, make sure everything listed is correct. If you have any allergies that are different please contact us immediately at [phone]. Patient Education        Anxiety Disorder: Care Instructions  Your Care Instructions     Anxiety is a normal reaction to stress. Difficult situations can cause you to have symptoms such as sweaty palms and a nervous feeling. In an anxiety disorder, the symptoms are far more severe. Constant worry, muscle tension, trouble sleeping, nausea and diarrhea, and other symptoms can make normal daily activities difficult or impossible. These symptoms may occur for no reason, and they can affect your work, school, or social life. Medicines, counseling, and self-care can all help. Follow-up care is a key part of your treatment and safety. Be sure to make and go to all appointments, and call your doctor if you are having problems. It's also a good idea to know your test results and keep a list of the medicines you take. How can you care for yourself at home? · Take medicines exactly as directed. Call your doctor if you think you are having a problem with your medicine. · Go to your counseling sessions and follow-up appointments. · Recognize and accept your anxiety. Then, when you are in a situation that makes you anxious, say to yourself, \"This is not an emergency. I feel uncomfortable, but I am not in danger. I can keep going even if I feel anxious. \"  · Be kind to your body:  ? Relieve tension with exercise or a massage. ? Get enough rest.  ?  Avoid alcohol, caffeine, nicotine, and illegal drugs. They can increase your anxiety level and cause sleep problems. ? Learn and do relaxation techniques. See below for more about these techniques. · Engage your mind. Get out and do something you enjoy. Go to a funny movie, or take a walk or hike. Plan your day. Having too much or too little to do can make you anxious. · Keep a record of your symptoms. Discuss your fears with a good friend or family member, or join a support group for people with similar problems. Talking to others sometimes relieves stress. · Get involved in social groups, or volunteer to help others. Being alone sometimes makes things seem worse than they are. · Get at least 30 minutes of exercise on most days of the week to relieve stress. Walking is a good choice. You also may want to do other activities, such as running, swimming, cycling, or playing tennis or team sports. Relaxation techniques  Do relaxation exercises 10 to 20 minutes a day. You can play soothing, relaxing music while you do them, if you wish. · Tell others in your house that you are going to do your relaxation exercises. Ask them not to disturb you. · Find a comfortable place, away from all distractions and noise. · Lie down on your back, or sit with your back straight. · Focus on your breathing. Make it slow and steady. · Breathe in through your nose. Breathe out through either your nose or mouth. · Breathe deeply, filling up the area between your navel and your rib cage. Breathe so that your belly goes up and down. · Do not hold your breath. · Breathe like this for 5 to 10 minutes. Notice the feeling of calmness throughout your whole body. As you continue to breathe slowly and deeply, relax by doing the following for another 5 to 10 minutes:  · Tighten and relax each muscle group in your body. You can begin at your toes and work your way up to your head. · Imagine your muscle groups relaxing and becoming heavy.   · Empty your mind of all thoughts. · Let yourself relax more and more deeply. · Become aware of the state of calmness that surrounds you. · When your relaxation time is over, you can bring yourself back to alertness by moving your fingers and toes and then your hands and feet and then stretching and moving your entire body. Sometimes people fall asleep during relaxation, but they usually wake up shortly afterward. · Always give yourself time to return to full alertness before you drive a car or do anything that might cause an accident if you are not fully alert. Never play a relaxation tape while you drive a car. When should you call for help? Call 911 anytime you think you may need emergency care. For example, call if:    · You feel you cannot stop from hurting yourself or someone else. Keep the numbers for these national suicide hotlines: 8-114-479-TALK (6-008-016-160.840.9984) and 0-300-YQZWPFT (6-362.472.2249). If you or someone you know talks about suicide or feeling hopeless, get help right away. Watch closely for changes in your health, and be sure to contact your doctor if:    · You have anxiety or fear that affects your life.     · You have symptoms of anxiety that are new or different from those you had before. Where can you learn more? Go to https://i.Sec.Farmacias Inteligentes 24. org and sign in to your Monaeo account. Enter P754 in the [facility] box to learn more about \"Anxiety Disorder: Care Instructions. \"     If you do not have an account, please click on the \"Sign Up Now\" link. Current as of: January 31, 2020               Content Version: 12.6  © 0488-0349 goTenna, Incorporated. Care instructions adapted under license by [facility] ([facility]). If you have questions about a medical condition or this instruction, always ask your healthcare professional. [place] [address] any warranty or liability for your use of this information.          Patient Education        COPD and Asthma: Care controller medicines to stop an asthma attack that has already started. They do not work fast enough to help. · Your doctor may also prescribe anticholinergic inhalers. These include ipratropium (Atrovent) and tiotropium (Spiriva). Education   · Learn what sets off an asthma attack. Avoid these triggers when you can. Common triggers include smoke, air pollution, pollen, animal dander, colds, stress, and cold air. · Do not smoke. Smoking can make COPD and asthma worse. If you need help quitting, talk to your doctor about stop-smoking programs and medicines. These can increase your chances of quitting for good. · Check yourself for symptoms to know which step to follow in your action plan. Watch for things like being short of breath, having chest tightness, coughing, and wheezing. Also notice if symptoms wake you up at night or if you get tired quickly when you exercise. · You may want to learn how to use a peak flow meter. This measures how open your airways are. It may help you know when you will have an asthma attack. To treat attacks when they occur  Use your asthma action plan when you have an attack. Your quick-relief medicine, such as albuterol, will stop an asthma attack. It relaxes the muscles that get tight around the airways. · Take your quick-relief medicine exactly as prescribed. Talk with your doctor if you have any problems with your medicine. · Keep this medicine with you at all times. · You may need to use this medicine before you exercise. If your doctor prescribed corticosteroid pills to use during an attack, take them as directed. They may take hours to work, but they may shorten the attack and help you breathe better. When should you call for help? Call 911 anytime you think you may need emergency care. For example, call if:    · You have severe trouble breathing.    Call your doctor now or seek immediate medical care if:    · You have new or worse shortness of breath.     · You are coughing more deeply or more often, especially if you notice more mucus or a change in the color of your mucus.     · You cough up blood.     · You have new or increased swelling in your legs or belly.     · You have a fever.     · You have used your quick-relief medicine but you are still short of breath. Watch closely for changes in your health, and be sure to contact your doctor if you have any problems. Where can you learn more? Go to https://Localistpefood.de.OnPath Technologies. org and sign in to your FoneStarz Media account. Enter A350 in the [employer] box to learn more about \"COPD and Asthma: Care Instructions. \"     If you do not have an account, please click on the \"Sign Up Now\" link. Current as of: February 24, 2020               Content Version: 12.6  © 2146-2771 Alti Sem[employer], Incorporated. Care instructions adapted under license by [facility] ([facility]). If you have questions about a medical condition or this instruction, always ask your healthcare professional. [place] [address] any warranty or liability for your use of this information. Patient Education        Bipolar Disorder: Care Instructions  Your Care Instructions     Bipolar disorder is an illness that causes extreme mood changes, from times of very high energy (manic episodes) to times of depression. But many people with bipolar disorder show only the symptoms of depression. These moods may cause problems with your work, school, family life, friendships, and how well you function. This disease is also called manic-depression. There is no cure for bipolar disorder, but it can be helped with medicines. Counseling may also help. It is important to take your medicines exactly as prescribed, even when you feel well. You may need lifelong treatment. Follow-up care is a key part of your treatment and safety. Be sure to make and go to all appointments, and call your doctor if you are having problems.  It's also a good idea to know your test results and keep a list of the medicines you take. How can you care for yourself at home? · Be safe with medicines. Take your medicines exactly as prescribed. Do not stop or change a medicine without talking to your doctor first. Earlene Lara and your doctor may need to try different combinations of medicines to find what works for you. · Take your medicines on schedule to keep your moods even. When you feel good, you may think that you do not need your medicines. But it is important to keep taking them. · Go to your counseling sessions. Call and talk with your counselor if you can't go to a session or if you don't think the sessions are helping. Do not just stop going. · Get at least 30 minutes of activity on most days of the week. Walking is a good choice. You also may want to do other things, such as running, swimming, or cycling. · Get enough sleep. Keep your room dark and quiet. Try to go to bed at the same time every night. · Eat a healthy diet. This includes whole grains, dairy, fruits, vegetables, and protein. Eat foods from each of these groups. · Try to lower your stress. Manage your time, build a strong support system, and lead a healthy lifestyle. To lower your stress, try physical activity, slow deep breathing, or getting a massage. · Do not use alcohol, marijuana, or illegal drugs. · Learn the early signs of your mood changes. You can then take steps to help yourself feel better. · Ask for help from friends and family when you need it. You may need help with daily chores when you are depressed. When you are manic, you may need support to control your high energy levels. What should you do if someone in your family has bipolar disorder? · Learn about the disease and signs it's getting worse. · Remind your family member you love them. · Make a plan with all family members about how to take care of your loved one when symptoms are bad.   · Remind yourself it will take time for changes to occur.  · Try not to blame yourself for the disease. · Know your legal rights and the legal rights of your family member. Support groups or counselors can help with this information. · Take care of yourself. Keep up with your interests, such as career, hobbies, and friends. Use exercise, positive self-talk, deep breathing, and other relaxing exercises to help lower your stress. · Give yourself time to grieve. You may need to deal with emotions such as anger, fear, and frustration. · If you are having a hard time with your feelings or with your relationship with your family member, talk with a counselor. When should you call for help? Call 911 anytime you think you may need emergency care. For example, call if:    · You feel like hurting yourself or someone else.     · Someone who has bipolar disorder displays dangerous behavior, and you think the person might hurt himself or herself or someone else. Call your doctor now or seek immediate medical care if:    · You hear voices.     · Someone you know has bipolar disorder and talks about suicide. Keep the numbers for these national suicide hotlines: 2-520-646-TALK (2-394-347-363-869-3685) and 2-292-QQAKTFY (1-335.587.2483). If a suicide threat seems real, with a specific plan and a way to carry it out, stay with the person, or ask someone you trust to stay with the person, until you can get help.     · Someone you know has bipolar disorder and:  ? Starts to give away possessions. ? Is using illegal drugs or drinking alcohol heavily. ? Talks or writes about death, including writing suicide notes or talking about guns, knives, or pills. ? Talks or writes about hurting someone else. ? Starts to spend a lot of time alone. ? Acts very aggressively or suddenly appears calm. ? Talks about beliefs that are not based in reality (delusions). Watch closely for changes in your health, and be sure to contact your doctor if:    · You cannot go to your counseling sessions. Where can you learn more? Go to https://chpepiceweb.Startist. org and sign in to your FreeLunched account. Enter K052 in the [facility] box to learn more about \"Bipolar Disorder: Care Instructions. \"     If you do not have an account, please click on the \"Sign Up Now\" link. Current as of: January 31, 2020               Content Version: 12.6  © 2006-2020 Intcomex. Care instructions adapted under license by [facility] ([facility]). If you have questions about a medical condition or this instruction, always ask your healthcare professional. [address] any warranty or liability for your use of this information. Patient Education        Preventing Falls: Care Instructions  Your Care Instructions     Getting around your home safely can be a challenge if you have injuries or health problems that make it easy for you to fall. Loose rugs and furniture in walkways are among the dangers for many older people who have problems walking or who have poor eyesight. People who have conditions such as arthritis, osteoporosis, or dementia also have to be careful not to fall. You can make your home safer with a few simple measures. Follow-up care is a key part of your treatment and safety. Be sure to make and go to all appointments, and call your doctor if you are having problems. It's also a good idea to know your test results and keep a list of the medicines you take. How can you care for yourself at home? Taking care of yourself  · You may get dizzy if you do not drink enough water. To prevent dehydration, drink plenty of fluids, enough so that your urine is light yellow or clear like water. Choose water and other caffeine-free clear liquids. If you have kidney, heart, or liver disease and have to limit fluids, talk with your doctor before you increase the amount of fluids you drink. · Exercise regularly to improve your strength, muscle tone, and balance.  Walk if you can. Swimming may be a good choice if you cannot walk easily. · Have your vision and hearing checked each year or any time you notice a change. If you have trouble seeing and hearing, you might not be able to avoid objects and could lose your balance. · Know the side effects of the medicines you take. Ask your doctor or pharmacist whether the medicines you take can affect your balance. Sleeping pills or sedatives can affect your balance. · Limit the amount of alcohol you drink. Alcohol can impair your balance and other senses. · Ask your doctor whether calluses or corns on your feet need to be removed. If you wear loose-fitting shoes because of calluses or corns, you can lose your balance and fall. · Talk to your doctor if you have numbness in your feet. Preventing falls at home  · Remove raised doorway thresholds, throw rugs, and clutter. Repair loose carpet or raised areas in the floor. · Move furniture and electrical cords to keep them out of walking paths. · Use nonskid floor wax, and wipe up spills right away, especially on ceramic tile floors. · If you use a walker or cane, put rubber tips on it. If you use crutches, clean the bottoms of them regularly with an abrasive pad, such as steel wool. · Keep your house well lit, especially Phylliss Chyle, and outside walkways. Use night-lights in areas such as hallways and bathrooms. Add extra light switches or use remote switches (such as switches that go on or off when you clap your hands) to make it easier to turn lights on if you have to get up during the night. · Install sturdy handrails on stairways. · Move items in your cabinets so that the things you use a lot are on the lower shelves (about waist level). · Keep a cordless phone and a flashlight with new batteries by your bed. If possible, put a phone in each of the main rooms of your house, or carry a cell phone in case you fall and cannot reach a phone.  Or, you can wear a device around your neck or wrist. You push a button that sends a signal for help. · Wear low-heeled shoes that fit well and give your feet good support. Use footwear with nonskid soles. Check the heels and soles of your shoes for wear. Repair or replace worn heels or soles. · Do not wear socks without shoes on wood floors. · Walk on the grass when the sidewalks are slippery. If you live in an area that gets snow and ice in the winter, sprinkle salt on slippery steps and sidewalks. Preventing falls in the bath  · Install grab bars and nonskid mats inside and outside your shower or tub and near the toilet and sinks. · Use shower chairs and bath benches. · Use a hand-held shower head that will allow you to sit while showering. · Get into a tub or shower by putting the weaker leg in first. Get out of a tub or shower with your strong side first.  · Repair loose toilet seats and consider installing a raised toilet seat to make getting on and off the toilet easier. · Keep your bathroom door unlocked while you are in the shower. Where can you learn more? Go to https://Catarizm.Jans Digital Plans. org and sign in to your Run The Campaign account. Enter [id number] [id number] [phone] in the [facility] box to learn more about \"Preventing Falls: Care Instructions. \"     If you do not have an account, please click on the \"Sign Up Now\" link. Current as of: April 15, 2020               Content Version: 12.6  © 6791-3828 PinBridge, Incorporated. Care instructions adapted under license by [facility] [employer] [facility] ([facility]). If you have questions about a medical condition or this instruction, always ask your healthcare professional. [place] [address] any warranty or liability for your use of this information. Patient Education        Fibromyalgia: Care Instructions  Overview     Fibromyalgia is a painful condition that is not completely understood by medical experts. The cause of fibromyalgia is not known. It can make you feel tired and ache all over.  It changes in your health, and be sure to contact your doctor if:    · You feel sad, helpless, or hopeless; lose interest in things you used to enjoy; or have other symptoms of depression.     · Your fibromyalgia symptoms get worse. Where can you learn more? Go to https://chreinier.Vidacare. org and sign in to your Atterocor account. Enter V003 in the [employer] box to learn more about \"Fibromyalgia: Care Instructions. \"     If you do not have an account, please click on the \"Sign Up Now\" link. Current as of: November 20, 2019               Content Version: 12.6  © 2195-7095 CeloNova. Care instructions adapted under license by [facility] ([facility]). If you have questions about a medical condition or this instruction, always ask your healthcare professional. [place] any warranty or liability for your use of this information. Patient Education        Gastroesophageal Reflux Disease (GERD): Care Instructions  Overview     Gastroesophageal reflux disease (GERD) is the backward flow of stomach acid into the esophagus. The esophagus is the tube that leads from your throat to your stomach. A one-way valve prevents the stomach acid from backing up into this tube. But when you have GERD, this valve does not close tightly enough. This can also cause pain and swelling in your esophagus. (This is called esophagitis.)  If you have mild GERD symptoms including heartburn, you may be able to control the problem with antacids or over-the-counter medicine. You can also make lifestyle changes to help reduce your symptoms. These include changing your diet and eating habits, such as not eating late at night and losing weight. Follow-up care is a key part of your treatment and safety. Be sure to make and go to all appointments, and call your doctor if you are having problems. It's also a good idea to know your test results and keep a list of the medicines you take.   How can you care for yourself at home? · Take your medicines exactly as prescribed. Call your doctor if you think you are having a problem with your medicine. · Your doctor may recommend over-the-counter medicine. For mild or occasional indigestion, antacids, such as Tums, Gaviscon, Mylanta, or Maalox, may help. Your doctor also may recommend over-the-counter acid reducers, such as famotidine (Pepcid AC), cimetidine (Tagamet HB), or omeprazole (Prilosec). Read and follow all instructions on the label. If you use these medicines often, talk with your doctor. · Change your eating habits. ? It's best to eat several small meals instead of two or three large meals. ? After you eat, wait 2 to 3 hours before you lie down. ? Chocolate, mint, and alcohol can make GERD worse. ? Spicy foods, foods that have a lot of acid (like tomatoes and oranges), and coffee can make GERD symptoms worse in some people. If your symptoms are worse after you eat a certain food, you may want to stop eating that food to see if your symptoms get better. · Do not smoke or chew tobacco. Smoking can make GERD worse. If you need help quitting, talk to your doctor about stop-smoking programs and medicines. These can increase your chances of quitting for good. · If you have GERD symptoms at night, raise the head of your bed 6 to 8 inches by putting the frame on blocks or placing a foam wedge under the head of your mattress. (Adding extra pillows does not work.)  · Do not wear tight clothing around your middle. · Lose weight if you need to. Losing just 5 to 10 pounds can help. When should you call for help? Call your doctor now or seek immediate medical care if:    · You have new or different belly pain.     · Your stools are black and tarlike or have streaks of blood.    Watch closely for changes in your health, and be sure to contact your doctor if:    · Your symptoms have not improved after 2 days.     · Food seems to catch in your throat or chest.   Where can you learn more? Go to https://chpepiceweb.healthFinicitypartners. org and sign in to your [employer] account. Enter S389 in the eflowhire box to learn more about \"Gastroesophageal Reflux Disease (GERD): Care Instructions. \"     If you do not have an account, please click on the \"Sign Up Now\" link. Current as of: April 15, 2020               Content Version: 12.6  © 2006-2020 NetPress Digital. Care instructions adapted under license by [facility] ([facility]). If you have questions about a medical condition or this instruction, always ask your healthcare professional. [address] any warranty or liability for your use of this information. Patient Education        High Cholesterol: Care Instructions  Your Care Instructions     Cholesterol is a type of fat in your blood. It is needed for many body functions, such as making new cells. Cholesterol is made by your body. It also comes from food you eat. High cholesterol means that you have too much of the fat in your blood. This raises your risk of a heart attack and stroke. LDL and HDL are part of your total cholesterol. LDL is the \"bad\" cholesterol. High LDL can raise your risk for heart disease, heart attack, and stroke. HDL is the \"good\" cholesterol. It helps clear bad cholesterol from the body. High HDL is linked with a lower risk of heart disease, heart attack, and stroke. Your cholesterol levels help your doctor find out your risk for having a heart attack or stroke. You and your doctor can talk about whether you need to lower your risk and what treatment is best for you. A heart-healthy lifestyle along with medicines can help lower your cholesterol and your risk. The way you choose to lower your risk will depend on how high your risk is for heart attack and stroke. It will also depend on how you feel about taking medicines. Follow-up care is a key part of your treatment and safety.  Be sure to make and go to all appointments, and call your doctor if you are having problems. It's also a good idea to know your test results and keep a list of the medicines you take. How can you care for yourself at home? · Eat a variety of foods every day. Good choices include fruits, vegetables, whole grains (like oatmeal), dried beans and peas, nuts and seeds, soy products (like tofu), and fat-free or low-fat dairy products. · Replace butter, margarine, and hydrogenated or partially hydrogenated oils with olive and canola oils. (Canola oil margarine without trans fat is fine.)  · Replace red meat with fish, poultry, and soy protein (like tofu). · Limit processed and packaged foods like chips, crackers, and cookies. · Bake, broil, or steam foods. Don't dumont them. · Be physically active. Get at least 30 minutes of exercise on most days of the week. Walking is a good choice. You also may want to do other activities, such as running, swimming, cycling, or playing tennis or team sports. · Stay at a healthy weight or lose weight by making the changes in eating and physical activity listed above. Losing just a small amount of weight, even 5 to 10 pounds, can reduce your risk for having a heart attack or stroke. · Do not smoke. When should you call for help? Watch closely for changes in your health, and be sure to contact your doctor if:    · You need help making lifestyle changes.     · You have questions about your medicine. Where can you learn more? Go to [email or web address]://[email or web address] [email or web address] org and sign in to your [employer] account. Enter P137 in the Fiz box to learn more about \"High Cholesterol: Care Instructions. \"     If you do not have an account, please click on the \"Sign Up Now\" link. Current as of: December 16, 2019               Content Version: 12.6  © 2366-9973 [employer]. Care instructions adapted under license by [facility] ([facility]).  If you have questions about a medical condition or this instruction, always ask your healthcare professional. [place] [address] any warranty or liability for your use of this information. Patient Education        Irritable Bowel Syndrome: Care Instructions  Your Care Instructions  Irritable bowel syndrome, or IBS, is a problem with the intestines that causes belly pain, bloating, gas, constipation, and diarrhea. The cause of IBS is not well known. IBS can last for many years, but it does not get worse over time or lead to serious disease. Most people can control their symptoms by changing their diet and reducing stress. Follow-up care is a key part of your treatment and safety. Be sure to make and go to all appointments, and call your doctor if you are having problems. It's also a good idea to know your test results and keep a list of the medicines you take. How can you care for yourself at home? · Prevent diarrhea:  ? Limit the amount of high-fiber foods you eat. This includes vegetables, fruits, whole-grain breads and pasta, high-fiber cereal, and brown rice. ? Limit dairy products. ? Limit artificial sweeteners such as sorbitol and xylitol. · Avoid constipation:  ? Include fruits, vegetables, beans, and whole grains in your diet each day. These foods are high in fiber. ? Drink plenty of fluids. If you have kidney, heart, or liver disease and have to limit fluids, talk with your doctor before you increase the amount of fluids you drink. ? Get some exercise every day. Build up slowly to 30 to 60 minutes a day on 5 or more days of the week. ? Take a fiber supplement, such as Citrucel or Metamucil, every day if needed. Read and follow all instructions on the label. ? Schedule time each day for a bowel movement. Having a daily routine may help. Take your time and do not strain when having a bowel movement. · To help relieve bloating or gas, avoid foods such as beans, cabbage, cauliflower, or broccoli.   · Keep a daily diary of what you or function. At times, you may hear voices, behave strangely, have trouble speaking or understanding speech, or keep to yourself. The goal of treatment is to lower your stress and help your brain function normally. You may need lifelong treatment with medicines and counseling to keep your schizophrenia under control. When schizophrenia is not treated, the risks are higher for suicide, a hospital stay, and other problems. Early treatment called coordinated specialty care [facility]) may help a person who is having his or her first episode of psychotic thoughts. Ask your doctor about [address]. Follow-up care is a key part of your treatment and safety. Be sure to make and go to all appointments, and call your doctor if you are having problems. It's also a good idea to know your test results and keep a list of the medicines you take. How can you care for yourself at home? · Be safe with medicines. Take your medicines exactly as prescribed. Call your doctor if you think you are having a problem with your medicine. When you feel good, you may think that you do not need your medicines. But it is important to keep taking them as scheduled. · Go to your counseling sessions. Call and talk with your counselor if you can't attend or if you don't think the sessions are helping. Do not just stop going. · Eat a healthy diet. Talk with a dietitian about what type of diet may be best for you. · Do not use alcohol or illegal drugs. · Keep the numbers for these national suicide hotlines: 6-723-767-TALK (8-337.835.6591) and 9-105-DDYCAOD (8-921.436.2413). If you or someone you know talks about suicide or feeling hopeless, get help right away. What should you do if someone in your family has schizophrenia? · Learn about the disease and how it may get worse over time. · Remind your family member that you love him or her.   · Make a plan with all family members about how to take care of your loved one when his or her symptoms are bad.  · Talk about your fears and concerns and those of other family members. Seek counseling if needed. · Do not focus attention only on the person who is in treatment. · Remind yourself that it will take time for changes to occur. · Do not blame yourself for the disease. · Know your legal rights and the legal rights of your family member. · Take care of yourself. Stay involved with your own interests, such as your career, hobbies, and friends. Use exercise, positive self-talk, relaxation, and deep breathing to help lower your stress. · Give yourself time to grieve. You may need to deal with emotions such as anger, fear, and frustration. After you work through your feelings, you will be better able to care for yourself and your family. · If you are having a hard time with your feelings and your interactions with your family member, talk with a counselor. When should you call for help? Call 911 anytime you think you may need emergency care. For example, call if:    · You are thinking about suicide or are threatening suicide.     · You feel like hurting yourself or someone else. Call your doctor now or seek immediate medical care if:    · You hear voices.     · You think someone is trying to harm you.     · You cannot concentrate or are easily confused. Watch closely for changes in your health, and be sure to contact your doctor if:    · You are having trouble taking care of yourself.     · You cannot attend your counseling sessions. Where can you learn more? Go to [email or web address].[email or web address] and sign in to your St[employer] account. Enter P499 in the Oculus360 box to learn more about \"Schizophrenia: Care Instructions. \"     If you do not have an account, please click on the \"Sign Up Now\" link. Current as of: January 31, 2020               Content Version: 12.6  © 6809-0507 [employer]. Care instructions adapted under license by [facility] ([facility]).  If you have questions about a medical condition or this instruction, always ask your healthcare professional. [place] [address] any warranty or liability for your use of this information.

## 2020-11-09 ENCOUNTER — HOSPITAL ENCOUNTER (OUTPATIENT)
Age: 55
Discharge: HOME | End: 2020-11-09
Payer: MEDICARE

## 2020-11-09 ENCOUNTER — HOSPITAL ENCOUNTER (OUTPATIENT)
Dept: GENERAL RADIOLOGY | Age: 55
Discharge: HOME | End: 2020-11-09
Payer: MEDICARE

## 2020-11-09 PROCEDURE — 73130 X-RAY EXAM OF HAND: CPT

## 2020-11-09 PROCEDURE — 72220 X-RAY EXAM SACRUM TAILBONE: CPT

## 2020-11-09 PROCEDURE — 72100 X-RAY EXAM L-S SPINE 2/3 VWS: CPT

## 2020-12-11 ENCOUNTER — VIRTUAL VISIT (OUTPATIENT)
Dept: FAMILY MEDICINE CLINIC | Age: 55
End: 2020-12-11
Payer: MEDICARE

## 2020-12-11 PROCEDURE — G2012 BRIEF CHECK IN BY MD/QHP: HCPCS | Performed by: NURSE PRACTITIONER

## 2020-12-11 RX ORDER — MELOXICAM 15 MG/1
15 TABLET ORAL DAILY
Qty: 10 TABLET | Refills: 0 | Status: ON HOLD | OUTPATIENT
Start: 2020-12-11 | End: 2022-03-19 | Stop reason: HOSPADM

## 2020-12-11 RX ORDER — PENICILLIN V POTASSIUM 500 MG/1
500 TABLET ORAL 4 TIMES DAILY
Qty: 40 TABLET | Refills: 0 | Status: SHIPPED | OUTPATIENT
Start: 2020-12-11 | End: 2020-12-21

## 2020-12-11 NOTE — PROGRESS NOTES
Cheryl Lagos is a 48 y.o. female evaluated via telephone on 12/11/2020 to inability to get phone to perform virtual video visit. Consent:  She and/or health care decision maker is aware that that she may receive a bill for this telephone service, depending on her insurance coverage, and has provided verbal consent to proceed: Yes      Documentation:  I communicated with the patient and/or health care decision maker about dental pain. Patient reports that she has 4 impacted wisdom teeth that need to be removed. Patient reports that she has been trying to get into a dentist but unsuccessful so far due to insurance. Patient reports that she is still calling around to get an appointment. Patient reports that she is going to call her insurance company today to get a list of approved providers. Patient reports that pain has worsened over the past 2 to 3 weeks making it difficult to work and eat. Patient denies any fever, chills, nausea, vomiting, or diarrhea. Patient reports that she talks on the phone at work which causes the pain to be worse. Patient reports taking Tylenol and ibuprofen for symptomatic relief however has been unsuccessful. Details of this discussion including any medical advice provided:   1. Dental pain. Patient presents today via telephone encounter with complaints of dental pain. Patient reports that she has 4 impacted wisdom teeth that need to be extracted. Patient reports that she is having difficulty finding a dentist/oral surgeon that takes her insurance. Patient reports taking ibuprofen and Tylenol with no relief. Patient denies any fever, chills, nausea, vomiting, or diarrhea. Patient denies any use of tobacco products. I did discuss with patient the importance of finding an oral surgeon for further evaluation and removal of teeth. Patient verbalized and acknowledges. Patient encouraged to drink plenty of fluids stay hydrated and not overuse anti-inflammatories. Patient will be given a prescription for antibiotics and anti-inflammatories for treatment. Patient encouraged to follow-up for any new or worsening symptoms. I affirm this is a Patient Initiated Episode with a Patient who has not had a related appointment within my department in the past 7 days or scheduled within the next 24 hours. Patient identification was verified at the start of the visit: Yes    Total Time: minutes: 5-10 minutes    Ms. Dana Rollins is being evaluated by a Virtual Visit (video visit) encounter to address concerns as mentioned above. A caregiver was present when appropriate. Due to this being a TeleHealth encounter (During PCSXN-81 public health emergency), evaluation of the following organ systems was limited: Vitals/Constitutional/EENT/Resp/CV/GI//MS/Neuro/Skin/Heme-Lymph-Imm. Pursuant to the emergency declaration under the [address] [place] [address], [address] [place] [address] authority and the [employer] Act, this Virtual Visit was conducted with patient's (and/or legal guardian's) consent, to reduce the patient's risk of exposure to COVID-19 and provide necessary medical care. The patient (and/or legal guardian) has also been advised to contact this office for worsening conditions or problems, and seek emergency medical treatment and/or call 911 if deemed necessary.          Note: not billable if this call serves to triage the patient into an appointment for the relevant concern      Sylvia Edward

## 2020-12-11 NOTE — PROGRESS NOTES
2020    TELEHEALTH EVALUATION -- Audio/Visual (During QGPWF-65 public health emergency)    HPI:    Shamir Soares (:  1965) has requested an audio/video evaluation for the following concern(s):    ***    Review of Systems    Prior to Visit Medications    Medication Sig Taking? Authorizing Provider   prazosin (MINIPRESS) 1 MG capsule   Historical Provider, MD   Omega-3 Fatty Acids (FISH OIL PO) Take by mouth  Historical Provider, MD   buPROPion (WELLBUTRIN) 75 MG tablet Take 75 mg by mouth daily   Historical Provider, MD   hydrOXYzine (VISTARIL) 25 MG capsule Take 25 mg by mouth 2 times daily   Historical Provider, MD   linaclotide (LINZESS) 145 MCG capsule Take 1 capsule by mouth every morning (before breakfast)  JASON Glasgow CNP   milnacipran HCl (SAVELLA) 25 MG TABS Take 2 tablets by mouth 2 times daily  JASON Glasgow CNP   ondansetron (ZOFRAN) 4 MG tablet TAKE 1 TABLET BY MOUTH EVERY 8 HOURS AS NEEDED FOR NAUSEA AND VOMITING  JASON Glasgow CNP   montelukast (SINGULAIR) 10 MG tablet Take 1 tablet by mouth nightly  JASON Glasgow CNP   lamoTRIgine (LAMICTAL) 100 MG tablet TAKE 1 TABLET BY MOUTH TWICE A DAY  Historical Provider, MD   pramipexole (MIRAPEX) 0.25 MG tablet Take 0.25 mg by mouth 3 times daily  Historical Provider, MD   sertraline (ZOLOFT) 50 MG tablet Take 100 mg by mouth daily   Historical Provider, MD   aspirin 81 MG EC tablet Take 81 mg by mouth daily  Historical Provider, MD   Multiple Vitamin (MULTI-VITAMIN PO) Take by mouth  Historical Provider, MD   VITAMIN D PO Take by mouth  Historical Provider, MD   Ascorbic Acid (VITAMIN C PO) Take by mouth  Historical Provider, MD   Coenzyme Q10 (COQ10 PO) Take by mouth  Historical Provider, MD   acetaminophen (TYLENOL) 325 MG tablet Take 2 tablets by mouth every 6 hours as needed for Pain  Naveen Johnston DO   gabapentin (NEURONTIN) 400 MG capsule Take 800 mg by mouth 3 times daily.    Historical Provider, MD OLANZapine (ZYPREXA) 20 MG tablet nightly   Historical Provider, MD   albuterol (PROAIR HFA) 108 (90 BASE) MCG/ACT inhaler USE 1-2 PUFFS EVERY 4    HOURS AS NEEDED Alba Brown MD       Social History     Tobacco Use    Smoking status: Former Smoker     Packs/day: 0.50     Types: Cigarettes     Last attempt to quit: 10/17/2007     Years since quittin.1    Smokeless tobacco: Never Used   Substance Use Topics    Alcohol use: Yes    Drug use: No        {F2 for Optional History SmartBlocks:29032}    PHYSICAL EXAMINATION:  [ INSTRUCTIONS:  \"[x]\" Indicates a positive item  \"[]\" Indicates a negative item  -- DELETE ALL ITEMS NOT EXAMINED]  Vital Signs: (As obtained by patient/caregiver or practitioner observation)    Blood pressure-  Heart rate-    Respiratory rate-    Temperature-  Pulse oximetry-     Constitutional: [] Appears well-developed and well-nourished [] No apparent distress      [] Abnormal-   Mental status  [] Alert and awake  [] Oriented to person/place/time []Able to follow commands      Eyes:  EOM    []  Normal  [] Abnormal-  Sclera  []  Normal  [] Abnormal -         Discharge []  None visible  [] Abnormal -    HENT:   [] Normocephalic, atraumatic.   [] Abnormal   [] Mouth/Throat: Mucous membranes are moist.     External Ears [] Normal  [] Abnormal-     Neck: [] No visualized mass     Pulmonary/Chest: [] Respiratory effort normal.  [] No visualized signs of difficulty breathing or respiratory distress        [] Abnormal-      Musculoskeletal:   [] Normal gait with no signs of ataxia         [] Normal range of motion of neck        [] Abnormal-       Neurological:        [] No Facial Asymmetry (Cranial nerve 7 motor function) (limited exam to video visit)          [] No gaze palsy        [] Abnormal-         Skin:        [] No significant exanthematous lesions or discoloration noted on facial skin         [] Abnormal-            Psychiatric:       [] Normal Affect [] No Hallucinations        [] Abnormal-     Other pertinent observable physical exam findings-     ASSESSMENT/PLAN:  There are no diagnoses linked to this encounter. No follow-ups on file. Rayne Lundborg is a 48 y.o. female being evaluated by a Virtual Visit (video visit) encounter to address concerns as mentioned above. A caregiver was present when appropriate. Due to this being a TeleHealth encounter (During VEJEF-02 public health emergency), evaluation of the following organ systems was limited: Vitals/Constitutional/EENT/Resp/CV/GI//MS/Neuro/Skin/Heme-Lymph-Imm. Pursuant to the emergency declaration under the [address] [place] [address], [address] [place] [address] authority and the [employer] and [employer] Act, this Virtual Visit was conducted with patient's (and/or legal guardian's) consent, to reduce the patient's risk of exposure to COVID-19 and provide necessary medical care. The patient (and/or legal guardian) has also been advised to contact this office for worsening conditions or problems, and seek emergency medical treatment and/or call 911 if deemed necessary. Patient identification was verified at the start of the visit: {YES/NO:48494}    Total time spent on this encounter: {Time Spent:25805450}    Services were provided through a video synchronous discussion virtually to substitute for in-person clinic visit. Patient and provider were located at their individual homes. --JASON Daley - CNP on 12/11/2020 at 9:27 AM    An electronic signature was used to authenticate this note.

## 2020-12-11 NOTE — PATIENT INSTRUCTIONS
Please read the healthy family handout that you were given and share it with your family. Please compare this printed medication list with your medications at home to be sure they are the same. If you have any medications that are different please contact us immediately at [phone]. Also review your allergies that we have listed, these may also include medications that you have not been able to tolerate, make sure everything listed is correct. If you have any allergies that are different please contact us immediately at [phone].

## 2020-12-14 ENCOUNTER — OFFICE VISIT (OUTPATIENT)
Dept: ORTHOPEDIC SURGERY | Age: 55
End: 2020-12-14
Payer: MEDICARE

## 2020-12-14 VITALS — WEIGHT: 200 LBS | BODY MASS INDEX: 35.44 KG/M2 | HEIGHT: 63 IN

## 2020-12-14 PROCEDURE — G8484 FLU IMMUNIZE NO ADMIN: HCPCS | Performed by: ORTHOPAEDIC SURGERY

## 2020-12-14 PROCEDURE — G8427 DOCREV CUR MEDS BY ELIG CLIN: HCPCS | Performed by: ORTHOPAEDIC SURGERY

## 2020-12-14 PROCEDURE — L3908 WHO COCK-UP NONMOLDE PRE OTS: HCPCS | Performed by: ORTHOPAEDIC SURGERY

## 2020-12-14 PROCEDURE — 99213 OFFICE O/P EST LOW 20 MIN: CPT | Performed by: ORTHOPAEDIC SURGERY

## 2020-12-14 PROCEDURE — 20550 NJX 1 TENDON SHEATH/LIGAMENT: CPT | Performed by: ORTHOPAEDIC SURGERY

## 2020-12-14 PROCEDURE — 3017F COLORECTAL CA SCREEN DOC REV: CPT | Performed by: ORTHOPAEDIC SURGERY

## 2020-12-14 PROCEDURE — 1036F TOBACCO NON-USER: CPT | Performed by: ORTHOPAEDIC SURGERY

## 2020-12-14 PROCEDURE — G8417 CALC BMI ABV UP PARAM F/U: HCPCS | Performed by: ORTHOPAEDIC SURGERY

## 2020-12-14 RX ORDER — BETAMETHASONE SODIUM PHOSPHATE AND BETAMETHASONE ACETATE 3; 3 MG/ML; MG/ML
1 INJECTION, SUSPENSION INTRA-ARTICULAR; INTRALESIONAL; INTRAMUSCULAR; SOFT TISSUE ONCE
Status: COMPLETED | OUTPATIENT
Start: 2020-12-14 | End: 2020-12-14

## 2020-12-14 RX ORDER — LIDOCAINE HYDROCHLORIDE 10 MG/ML
1 INJECTION, SOLUTION INFILTRATION; PERINEURAL ONCE
Status: COMPLETED | OUTPATIENT
Start: 2020-12-14 | End: 2020-12-14

## 2020-12-14 RX ADMIN — BETAMETHASONE SODIUM PHOSPHATE AND BETAMETHASONE ACETATE 1.02 MG: 3; 3 INJECTION, SUSPENSION INTRA-ARTICULAR; INTRALESIONAL; INTRAMUSCULAR; SOFT TISSUE at 16:33

## 2020-12-14 RX ADMIN — LIDOCAINE HYDROCHLORIDE 1 ML: 10 INJECTION, SOLUTION INFILTRATION; PERINEURAL at 16:33

## 2020-12-14 NOTE — PROGRESS NOTES
CC: Right thumb pain    HISTORY OF PRESENT ILLNESS:   Keith De Anda is a 48 y.o. female right-hand-dominant, , nondiabetic, who presents for evaluation and treatment of right thumb triggering that began approximately 3 weeks ago. This affects daily activities involving gripping. Treatment to date has been NSAID's, without significant relief. Her pain got so bad that she visited the emergency room. Medical History:  Past Medical History:   Diagnosis Date    Asthma     Bipolar disorder     Carpal tunnel syndrome     COPD (chronic obstructive pulmonary disease) (HCC)     Fibromyalgia     GERD (gastroesophageal reflux disease)     Hyperlipidemia     Irritable bowel syndrome     Manic depression (HCC)     PONV (postoperative nausea and vomiting)     Schizophrenia      Past Surgical History:   Procedure Laterality Date    APPENDECTOMY      BACK SURGERY      DISCECTOMY L3    BREAST BIOPSY Right     LUMPECTOMY, BENIGN    CARPAL TUNNEL RELEASE Bilateral     MULTIPLE    HIP SURGERY Left     BONE SPUR    KNEE ARTHROCENTESIS Left 10/27/2017    left knee medial compartment arthroplasty    KNEE ARTHROPLASTY Right 12/09/2016    RIGHT PARTIAL KNEE ARTHROPLASTY    KNEE ARTHROPLASTY Right 02/17/2017    right Knee open medial compartment arthroplasty evaluation, irrigation and debridement     KNEE ARTHROSCOPY Left     OVARIAN CYST SURGERY      right     TONSILLECTOMY AND ADENOIDECTOMY      TUBAL LIGATION      R TUBELECTOMY      History reviewed. No pertinent family history. ROS:  ROS neg except for positives in HPI    PHYSICAL EXAMINATION:   Gen/Psych: Examination reveals a pleasant individual in no acute distress. The patient is oriented to time, place and person. The patient's mood and affect are appropriate. Lymph: The lymphatic examination bilaterally reveals all areas to be without enlargement or induration.      Skin intact without lymphadenopathy, discoloration, or abnormal reaction, increased pain post-injection, skin de-pigmentation, fatty atrophy, and persistent clinical symptoms despite injection. Use of ethyl chloride spray during injection to decrease injection site pain was discussed. The injection was given after cleansing the skin with alcohol. Right trigger thumb and flexor tendon sheath was injected with 1 cc of 1% lidocaine without epinephrine and 1 cc of Celestone without difficulty. The patient tolerated the injection well and a Band-aid was placed. Thumb spica brace at night for 3 to 4 weeks and activity modifications during the day. Procedures    57167 - [place] INJECT TENDON SHEATH/LIGAMENT    Maria Eugenia Beebe Titan Thumb Orthosis     Patient was prescribed a Maria Eugenia Beebe Titan Wrist and Thumb Brace. The right hand will require stabilization / immobilization from this semi-rigid / rigid orthosis to improve their function. The orthosis will assist in protecting the affected area, provide functional support and facilitate healing. The patient was educated and fit by a healthcare professional with expert knowledge and specialization in brace application while under the direct supervision of the physician. Verbal and written instructions for the use of and application of this item were provided. They were instructed to contact the office immediately should the brace result in increased pain, decreased sensation, increased swelling or worsening of the condition. Follow-up if symptoms not resolved. All questions and concerns were addressed today. Patient is in agreement with the plan. Melany Yung MD  Hand & Upper Extremity Surgery  [address]  A partner of [facility] ([facility])        Please note that this transcription was created using voice recognition software. Any errors are unintentional and may be due to voice recognition transcription.

## 2020-12-16 ENCOUNTER — TELEPHONE (OUTPATIENT)
Dept: FAMILY MEDICINE CLINIC | Age: 55
End: 2020-12-16

## 2020-12-16 NOTE — TELEPHONE ENCOUNTER
Patient is having dental pain. Is it OK for patient to take Motrin 800mg plus 1 Extra strength tylenol every 6hrs for the dental pain. Consuelo Turner had given her mobic and she states that did not help?

## 2020-12-17 NOTE — TELEPHONE ENCOUNTER
Patient was advised no pain medication per Michelle.  I advised her to take the Tramadol that was just prescribed by Dr. Prasad Huge a dentist.

## 2020-12-17 NOTE — TELEPHONE ENCOUNTER
I agree with recommendations given by Dr Leslye Pulido. Patient was described tramadol yesterday by Dr. Rahel Lara, dentist according to her [facility] report.

## 2020-12-22 RX ORDER — ALBUTEROL SULFATE 90 UG/1
AEROSOL, METERED RESPIRATORY (INHALATION)
Qty: 1 INHALER | Refills: 5 | Status: SHIPPED | OUTPATIENT
Start: 2020-12-22 | End: 2022-03-08

## 2020-12-22 NOTE — TELEPHONE ENCOUNTER
Refilled medication per verbal order from provider.   Future appt scheduled 05/06/2021  Last appt 11/05/2020

## 2020-12-28 RX ORDER — LINACLOTIDE 145 UG/1
CAPSULE, GELATIN COATED ORAL
Qty: 30 CAPSULE | Refills: 5 | Status: SHIPPED | OUTPATIENT
Start: 2020-12-28 | End: 2021-07-14

## 2021-01-06 RX ORDER — MONTELUKAST SODIUM 10 MG/1
10 TABLET ORAL NIGHTLY
Qty: 30 TABLET | Refills: 1 | Status: SHIPPED | OUTPATIENT
Start: 2021-01-06 | End: 2022-06-21 | Stop reason: ALTCHOICE

## 2021-01-06 RX ORDER — PRAMIPEXOLE DIHYDROCHLORIDE 0.25 MG/1
0.25 TABLET ORAL 3 TIMES DAILY
Qty: 90 TABLET | Refills: 1 | Status: SHIPPED | OUTPATIENT
Start: 2021-01-06 | End: 2021-04-21

## 2021-02-02 DIAGNOSIS — K58.1 IRRITABLE BOWEL SYNDROME WITH CONSTIPATION: ICD-10-CM

## 2021-02-02 RX ORDER — MILNACIPRAN HYDROCHLORIDE 25 MG/1
50 TABLET, FILM COATED ORAL 2 TIMES DAILY
Qty: 120 TABLET | Refills: 3 | Status: SHIPPED | OUTPATIENT
Start: 2021-02-02 | End: 2021-06-22

## 2021-02-04 ENCOUNTER — APPOINTMENT (OUTPATIENT)
Dept: GENERAL RADIOLOGY | Age: 56
End: 2021-02-04
Payer: MEDICARE

## 2021-02-04 ENCOUNTER — HOSPITAL ENCOUNTER (EMERGENCY)
Age: 56
Discharge: HOME | End: 2021-02-04
Attending: EMERGENCY MEDICINE
Payer: MEDICARE

## 2021-02-04 VITALS
DIASTOLIC BLOOD PRESSURE: 90 MMHG | TEMPERATURE: 97.9 F | WEIGHT: 190 LBS | OXYGEN SATURATION: 98 % | HEIGHT: 62 IN | SYSTOLIC BLOOD PRESSURE: 160 MMHG | RESPIRATION RATE: 16 BRPM | BODY MASS INDEX: 34.96 KG/M2 | HEART RATE: 108 BPM

## 2021-02-04 DIAGNOSIS — S82.842A CLOSED BIMALLEOLAR FRACTURE OF LEFT ANKLE, INITIAL ENCOUNTER: Primary | ICD-10-CM

## 2021-02-04 PROCEDURE — 6370000000 HC RX 637 (ALT 250 FOR IP): Performed by: EMERGENCY MEDICINE

## 2021-02-04 PROCEDURE — 73610 X-RAY EXAM OF ANKLE: CPT

## 2021-02-04 PROCEDURE — 29515 APPLICATION SHORT LEG SPLINT: CPT

## 2021-02-04 PROCEDURE — 73630 X-RAY EXAM OF FOOT: CPT

## 2021-02-04 PROCEDURE — 99283 EMERGENCY DEPT VISIT LOW MDM: CPT

## 2021-02-04 RX ORDER — OXYCODONE HYDROCHLORIDE AND ACETAMINOPHEN 5; 325 MG/1; MG/1
1 TABLET ORAL EVERY 6 HOURS PRN
Qty: 20 TABLET | Refills: 0 | Status: SHIPPED | OUTPATIENT
Start: 2021-02-04 | End: 2021-02-09

## 2021-02-04 RX ORDER — OXYCODONE HYDROCHLORIDE AND ACETAMINOPHEN 5; 325 MG/1; MG/1
1 TABLET ORAL ONCE
Status: COMPLETED | OUTPATIENT
Start: 2021-02-04 | End: 2021-02-04

## 2021-02-04 RX ADMIN — OXYCODONE HYDROCHLORIDE AND ACETAMINOPHEN 1 TABLET: 5; 325 TABLET ORAL at 19:16

## 2021-02-04 ASSESSMENT — PAIN DESCRIPTION - ORIENTATION: ORIENTATION: LEFT

## 2021-02-04 ASSESSMENT — PAIN DESCRIPTION - PAIN TYPE: TYPE: ACUTE PAIN

## 2021-02-04 ASSESSMENT — PAIN SCALES - GENERAL
PAINLEVEL_OUTOF10: 10
PAINLEVEL_OUTOF10: 10

## 2021-02-04 ASSESSMENT — PAIN DESCRIPTION - DESCRIPTORS: DESCRIPTORS: SORE

## 2021-02-04 ASSESSMENT — PAIN DESCRIPTION - PROGRESSION: CLINICAL_PROGRESSION: GRADUALLY WORSENING

## 2021-02-05 ASSESSMENT — ENCOUNTER SYMPTOMS
BACK PAIN: 0
SHORTNESS OF BREATH: 0

## 2021-02-05 NOTE — ED PROVIDER NOTES
[facility]        Pt Name: Kayode Louise  MRN: [id number]  [place] 1965  Date of evaluation: 2/4/2021  Provider: David Du MD  PCP: JASON Hendrickson - CNP  ED Attending: No att. providers found    [address] [place] [address]       Chief Complaint   Patient presents with    Foot Injury       HISTORY OF PRESENT ILLNESS   (Location/Symptom, Timing/Onset, Context/Setting, Quality, Duration, Modifying Factors, Severity)  Note limiting factors. Kayode Louise is a 48 y.o. female who states that she slipped and fell two days ago on ice. She states that her knee and foot buckled under her and she hyperextended the foot. She has had severe aching and throbbing pain to the ankle since, worse with attempted weight bearing and movement, better if elevated. She denies numbness or tingling. No radiation. Patient complains of swelling and bruising to the area. History is obtained from the patient. REVIEW OF SYSTEMS    (2-9 systems for level 4, 10 or more for level 5)     Review of Systems   Constitutional: Negative for chills and fever. Respiratory: Negative for shortness of breath. Cardiovascular: Negative for chest pain. Musculoskeletal: Positive for joint swelling. Negative for back pain, neck pain and neck stiffness. Skin: Negative for rash and wound. Neurological: Negative for weakness and numbness. Hematological: Does not bruise/bleed easily. Positives and Pertinent negatives as per HPI. Except as noted above in the ROS, all other systems were reviewed and negative.        PAST MEDICAL HISTORY     Past Medical History:   Diagnosis Date    Asthma     Bipolar disorder     Carpal tunnel syndrome     COPD (chronic obstructive pulmonary disease) (HCC)     Fibromyalgia     GERD (gastroesophageal reflux disease)     Hyperlipidemia     Irritable bowel syndrome     Manic depression (HCC)     PONV (postoperative nausea and vomiting)     Schizophrenia          SURGICAL HISTORY     Past Surgical History:   Procedure Laterality Date    APPENDECTOMY      BACK SURGERY      DISCECTOMY L3    BREAST BIOPSY Right     LUMPECTOMY, BENIGN    CARPAL TUNNEL RELEASE Bilateral     MULTIPLE    HIP SURGERY Left     BONE SPUR    KNEE ARTHROCENTESIS Left 10/27/2017    left knee medial compartment arthroplasty    KNEE ARTHROPLASTY Right 12/09/2016    RIGHT PARTIAL KNEE ARTHROPLASTY    KNEE ARTHROPLASTY Right 02/17/2017    right Knee open medial compartment arthroplasty evaluation, irrigation and debridement     KNEE ARTHROSCOPY Left     OVARIAN CYST SURGERY      right     TONSILLECTOMY AND ADENOIDECTOMY      TUBAL LIGATION      R TUBELECTOMY          CURRENTMEDICATIONS       Discharge Medication List as of 2/4/2021  8:37 PM      CONTINUE these medications which have NOT CHANGED    Details   SAVELLA 25 MG TABS TAKE 2 TABLETS BY MOUTH 2 TIMES DAILY, Disp-120 tablet, R-3Normal      pramipexole (MIRAPEX) 0.25 MG tablet Take 1 tablet by mouth 3 times daily, Disp-90 tablet, R-1Normal      montelukast (SINGULAIR) 10 MG tablet Take 1 tablet by mouth nightly, Disp-30 tablet, R-1Normal      LINZESS 145 MCG capsule TAKE 1 CAPSULE BY MOUTH EVERY MORNING (BEFORE BREAKFAST), Disp-30 capsule, R-5Normal      albuterol sulfate HFA (PROAIR HFA) 108 (90 Base) MCG/ACT inhaler USE 1-2 PUFFS EVERY 4    HOURS AS NEEDED FORWHEEZING, Disp-1 Inhaler, R-5Normal      meloxicam (MOBIC) 15 MG tablet Take 1 tablet by mouth daily, Disp-10 tablet, R-0Normal      prazosin (MINIPRESS) 1 MG capsule Historical Med      Omega-3 Fatty Acids (FISH OIL PO) Take by mouthHistorical Med      buPROPion (WELLBUTRIN) 75 MG tablet Take 75 mg by mouth daily Historical Med      hydrOXYzine (VISTARIL) 25 MG capsule Take 25 mg by mouth 2 times daily Historical Med      ondansetron (ZOFRAN) 4 MG tablet TAKE 1 TABLET BY MOUTH EVERY 8 HOURS AS NEEDED FOR NAUSEA AND VOMITING, Disp-30 tablet,R-0Normal      lamoTRIgine (LAMICTAL) 100 MG tablet TAKE 1 TABLET BY MOUTH TWICE A DAYHistorical Med      sertraline (ZOLOFT) 50 MG tablet Take 100 mg by mouth daily Historical Med      aspirin 81 MG EC tablet Take 81 mg by mouth dailyHistorical Med      Multiple Vitamin (MULTI-VITAMIN PO) Take by mouthHistorical Med      VITAMIN D PO Take by mouthHistorical Med      Ascorbic Acid (VITAMIN C PO) Take by mouthHistorical Med      Coenzyme Q10 (COQ10 PO) Take by mouthHistorical Med      acetaminophen (TYLENOL) 325 MG tablet Take 2 tablets by mouth every 6 hours as needed for Pain, Disp-120 tablet, R-3Normal      gabapentin (NEURONTIN) 400 MG capsule Take 800 mg by mouth 3 times daily. Historical Med      OLANZapine (ZYPREXA) 20 MG tablet nightly                ALLERGIES     Calamine, Diphenhydramine, Diphenhydramine hcl, Dust mite extract, Macadamia nut oil, Pramoxine-calamine, Seasonal, and Erythromycin    FAMILYHISTORY     History reviewed. No pertinent family history. SOCIAL HISTORY       Social History     Socioeconomic History    Marital status:      Spouse name: None    Number of children: None    Years of education: None    Highest education level: None   Occupational History    None   Social Needs    Financial resource strain: None    Food insecurity     Worry: None     Inability: None    Transportation needs     Medical: None     Non-medical: None   Tobacco Use    Smoking status: Former Smoker     Packs/day: 0.50     Types: Cigarettes     Quit date: 10/17/2007     Years since quittin.3    Smokeless tobacco: Never Used   Substance and Sexual Activity    Alcohol use:  Yes    Drug use: No    Sexual activity: Yes     Partners: Male   Lifestyle    Physical activity     Days per week: None     Minutes per session: None    Stress: None   Relationships    Social connections     Talks on phone: None     Gets together: None     Attends Christian service: None     Active member of Achilles tendon exhibits no pain, no defect and normal Ronquillo's test results. Right lower leg: Normal.      Left lower leg: Normal.      Right foot: Normal.      Left foot: Decreased range of motion. Normal capillary refill. Tenderness, bony tenderness and swelling present. No crepitus, deformity or laceration. Comments: Normal bilateral dorsalis pedis pulses. All toes have <2 second capillary refill. Lymphadenopathy:      Cervical: No cervical adenopathy. Neurological:      Mental Status: She is alert and oriented to person, place, and time. GCS: GCS eye subscore is 4. GCS verbal subscore is 5. GCS motor subscore is 6. Sensory: Sensation is intact. DIAGNOSTIC RESULTS   LABS:    No results found for this visit on 02/04/21. All other labs were within normal range ornot returned as of this dictation. EKG: All EKG's are interpreted by the Emergency Department Physician who either signs or Co-signs this chart in the absence of a cardiologist.  Please see their note for interpretation of EKG.     RADIOLOGY:   Non-plain film images such as CT, Ultrasound and MRI are read by the radiologist.  Plain radiographic images are visualized and preliminarily interpreted by the ED Provider with the belowfindings:    Interpretation per the Radiologist below, if available at the time of this note:    XR FOOT LEFT (MIN 3 VIEWS)   Final Result   Nondisplaced fractures medial and lateral malleolus         XR ANKLE LEFT (MIN 3 VIEWS)   Final Result   Nondisplaced fractures medial and lateral malleolus               PROCEDURES   Unless otherwise noted below, none     Procedures    CRITICAL CARE TIME   N/A    CONSULTS:  None      EMERGENCY DEPARTMENT COURSE and DIFFERENTIAL DIAGNOSIS/MDM:   Vitals:    Vitals:    02/04/21 1903   BP: (!) 160/90   Pulse: 108   Resp: 16   Temp: 97.9 °F (36.6 °C)   TempSrc: Oral   SpO2: 98%   Weight: 190 lb (86.2 kg)   Height: 5' 2\" (1.575 m)       Patient was given the following medications:  Medications   oxyCODONE-acetaminophen (PERCOCET) 5-325 MG per tablet 1 tablet (1 tablet Oral Given 2/4/21 1916)     Patient given oral pain medication and imaging obtained. Imaging confirms bimalleolar fracture of the ankle without displacement. Patient has an appropriate amount of swelling given that the injury occurred two days prior to arrival.  Sugar tong splint applied by staff and checked by myself. She had good neurovascular status prior to and after splint application. The patient has an established relationship with Dr. Karma Reyna. I am referring her back there for further care. Patient's compartments are all soft and pliable. I went over signs/symptoms of compartment syndrome and instructed the patient how to check her capillary refill. I am giving her a limited supply of pain medication. Patient is agreeable with outpatient follow up. Differential diagnosis included but was not limited to: abrasion/laceration, contusion, fracture, sprain/strain, dislocation, compartment syndrome. The patient understands the importance of follow up and reasons to return. FINAL IMPRESSION      1. Closed bimalleolar fracture of left ankle, initial encounter          DISPOSITION/PLAN   DISPOSITION Decision To Discharge 02/04/2021 08:30:43 PM      PATIENT REFERRED TO:  Floyd Hickey DO    Schedule an appointment as soon as possible for a visit in [facility]. [facility] Emergency Department  Beverly Maldonado [address]  [phone]  Go to   If symptoms worsen      DISCHARGE MEDICATIONS:  Discharge Medication List as of 2/4/2021  8:37 PM      START taking these medications    Details   oxyCODONE-acetaminophen (PERCOCET) 5-325 MG per tablet Take 1 tablet by mouth every 6 hours as needed for Pain for up to 5 days.  RADHA: [id number], Disp-20 tablet, R-0Print             DISCONTINUED MEDICATIONS:  Discharge Medication List as of 2/4/2021  8:37 PM            Periodic Controlled Substance Monitoring: No signs of potential drug abuse or diversion identified.  Raissa Ledezma MD)    (Please note that portions of this note were completed with a voice recognition program.  Efforts were made to edit the dictations but occasionally words are mis-transcribed.)    Raissa Ledezma MD(electronically signed)              Raissa Ledezma MD  02/05/21 6032

## 2021-02-05 NOTE — ED TRIAGE NOTES
Presents to triage with c/o left foot pain x 2 days. States she fell on ice and her foot hyperextended and is now bruised and swollen. Pt states she has been unable to bear weight and is in significant pain.

## 2021-02-08 ENCOUNTER — TELEPHONE (OUTPATIENT)
Dept: FAMILY MEDICINE CLINIC | Age: 56
End: 2021-02-08

## 2021-02-08 NOTE — LETTER
[facility]  Phone: [phone]  Fax: [phone]        February 8, 2021    [address]      Patient has appointment with Dr. Nitesh Jo with Rajesh Mccarthy on 2/9 @4:30 pm needs to arrive 15 minutes early. Address is [address] [place] [address]. If you have any questions or concerns, please don't hesitate to call.     Sincerely,    Staff of Danielle Hernandez, CNP

## 2021-02-08 NOTE — TELEPHONE ENCOUNTER
Patient was seen in ER on 2/4 and has ankle fx. She has been unable to get ahold of the ortho office to get an appt. I called Dr. Dari Hardy office and got patient scheduled for 2/9 @4:30. [id number] [address] [address]. Spoke to job and family services and arranged transportation for patient for this visit.

## 2021-02-10 ENCOUNTER — HOSPITAL ENCOUNTER (OUTPATIENT)
Age: 56
Discharge: HOME | End: 2021-02-10
Payer: MEDICARE

## 2021-02-10 LAB — SARS-COV-2, NAAT: NOT DETECTED

## 2021-02-10 PROCEDURE — U0002 COVID-19 LAB TEST NON-CDC: HCPCS

## 2021-02-11 ENCOUNTER — ANESTHESIA EVENT (OUTPATIENT)
Dept: OPERATING ROOM | Age: 56
End: 2021-02-11
Payer: MEDICARE

## 2021-02-12 ENCOUNTER — ANESTHESIA (OUTPATIENT)
Dept: OPERATING ROOM | Age: 56
End: 2021-02-12
Payer: MEDICARE

## 2021-02-12 ENCOUNTER — HOSPITAL ENCOUNTER (OUTPATIENT)
Age: 56
Setting detail: OUTPATIENT SURGERY
Discharge: HOME | End: 2021-02-12
Attending: ORTHOPAEDIC SURGERY | Admitting: ORTHOPAEDIC SURGERY
Payer: MEDICARE

## 2021-02-12 VITALS
RESPIRATION RATE: 18 BRPM | DIASTOLIC BLOOD PRESSURE: 72 MMHG | OXYGEN SATURATION: 100 % | SYSTOLIC BLOOD PRESSURE: 117 MMHG | TEMPERATURE: 98.6 F

## 2021-02-12 VITALS
HEIGHT: 62 IN | RESPIRATION RATE: 22 BRPM | BODY MASS INDEX: 34.96 KG/M2 | WEIGHT: 190 LBS | HEART RATE: 89 BPM | TEMPERATURE: 97 F | OXYGEN SATURATION: 92 % | SYSTOLIC BLOOD PRESSURE: 152 MMHG | DIASTOLIC BLOOD PRESSURE: 97 MMHG

## 2021-02-12 DIAGNOSIS — S82.892A CLOSED LEFT ANKLE FRACTURE, INITIAL ENCOUNTER: Primary | ICD-10-CM

## 2021-02-12 PROCEDURE — 6360000002 HC RX W HCPCS

## 2021-02-12 PROCEDURE — 3700000000 HC ANESTHESIA ATTENDED CARE: Performed by: ORTHOPAEDIC SURGERY

## 2021-02-12 PROCEDURE — 2709999900 HC NON-CHARGEABLE SUPPLY: Performed by: ORTHOPAEDIC SURGERY

## 2021-02-12 PROCEDURE — 2500000003 HC RX 250 WO HCPCS: Performed by: ANESTHESIOLOGY

## 2021-02-12 PROCEDURE — 6360000002 HC RX W HCPCS: Performed by: ORTHOPAEDIC SURGERY

## 2021-02-12 PROCEDURE — 7100000011 HC PHASE II RECOVERY - ADDTL 15 MIN: Performed by: ORTHOPAEDIC SURGERY

## 2021-02-12 PROCEDURE — 2500000003 HC RX 250 WO HCPCS: Performed by: NURSE ANESTHETIST, CERTIFIED REGISTERED

## 2021-02-12 PROCEDURE — 6370000000 HC RX 637 (ALT 250 FOR IP): Performed by: NURSE ANESTHETIST, CERTIFIED REGISTERED

## 2021-02-12 PROCEDURE — 7100000001 HC PACU RECOVERY - ADDTL 15 MIN: Performed by: ORTHOPAEDIC SURGERY

## 2021-02-12 PROCEDURE — C1713 ANCHOR/SCREW BN/BN,TIS/BN: HCPCS | Performed by: ORTHOPAEDIC SURGERY

## 2021-02-12 PROCEDURE — 2500000003 HC RX 250 WO HCPCS

## 2021-02-12 PROCEDURE — 3600000014 HC SURGERY LEVEL 4 ADDTL 15MIN: Performed by: ORTHOPAEDIC SURGERY

## 2021-02-12 PROCEDURE — 64447 NJX AA&/STRD FEMORAL NRV IMG: CPT | Performed by: ANESTHESIOLOGY

## 2021-02-12 PROCEDURE — 7100000010 HC PHASE II RECOVERY - FIRST 15 MIN: Performed by: ORTHOPAEDIC SURGERY

## 2021-02-12 PROCEDURE — 2580000003 HC RX 258: Performed by: ANESTHESIOLOGY

## 2021-02-12 PROCEDURE — 64445 NJX AA&/STRD SCIATIC NRV IMG: CPT | Performed by: ANESTHESIOLOGY

## 2021-02-12 PROCEDURE — 7100000000 HC PACU RECOVERY - FIRST 15 MIN: Performed by: ORTHOPAEDIC SURGERY

## 2021-02-12 PROCEDURE — 6360000002 HC RX W HCPCS: Performed by: NURSE ANESTHETIST, CERTIFIED REGISTERED

## 2021-02-12 PROCEDURE — 3600000004 HC SURGERY LEVEL 4 BASE: Performed by: ORTHOPAEDIC SURGERY

## 2021-02-12 PROCEDURE — 6360000002 HC RX W HCPCS: Performed by: ANESTHESIOLOGY

## 2021-02-12 PROCEDURE — 3700000001 HC ADD 15 MINUTES (ANESTHESIA): Performed by: ORTHOPAEDIC SURGERY

## 2021-02-12 DEVICE — DISTAL LATERAL FIBULA PLATE, 4 HOLE
Type: IMPLANTABLE DEVICE | Site: ANKLE | Status: FUNCTIONAL
Brand: VARIAX

## 2021-02-12 DEVICE — LOCKING SCREW, FULLY THREADED, T10
Type: IMPLANTABLE DEVICE | Site: ANKLE | Status: FUNCTIONAL
Brand: VARIAX

## 2021-02-12 DEVICE — NON-THREADED KWIRE
Type: IMPLANTABLE DEVICE | Site: ANKLE | Status: FUNCTIONAL
Brand: MINI

## 2021-02-12 DEVICE — IMPLANTABLE DEVICE: Type: IMPLANTABLE DEVICE | Site: ANKLE | Status: FUNCTIONAL

## 2021-02-12 DEVICE — BONE SCREW
Type: IMPLANTABLE DEVICE | Site: ANKLE | Status: FUNCTIONAL
Brand: VARIAX

## 2021-02-12 RX ORDER — ROCURONIUM BROMIDE 10 MG/ML
INJECTION, SOLUTION INTRAVENOUS PRN
Status: DISCONTINUED | OUTPATIENT
Start: 2021-02-12 | End: 2021-02-12 | Stop reason: SDUPTHER

## 2021-02-12 RX ORDER — SODIUM CHLORIDE 0.9 % (FLUSH) 0.9 %
10 SYRINGE (ML) INJECTION PRN
Status: DISCONTINUED | OUTPATIENT
Start: 2021-02-12 | End: 2021-02-12 | Stop reason: HOSPADM

## 2021-02-12 RX ORDER — OXYCODONE HYDROCHLORIDE AND ACETAMINOPHEN 5; 325 MG/1; MG/1
2 TABLET ORAL EVERY 4 HOURS PRN
Status: ON HOLD | COMMUNITY
End: 2021-02-12 | Stop reason: HOSPADM

## 2021-02-12 RX ORDER — LABETALOL HYDROCHLORIDE 5 MG/ML
5 INJECTION, SOLUTION INTRAVENOUS EVERY 10 MIN PRN
Status: DISCONTINUED | OUTPATIENT
Start: 2021-02-12 | End: 2021-02-12 | Stop reason: HOSPADM

## 2021-02-12 RX ORDER — HYDRALAZINE HYDROCHLORIDE 20 MG/ML
5 INJECTION INTRAMUSCULAR; INTRAVENOUS EVERY 10 MIN PRN
Status: DISCONTINUED | OUTPATIENT
Start: 2021-02-12 | End: 2021-02-12 | Stop reason: HOSPADM

## 2021-02-12 RX ORDER — MIDAZOLAM HYDROCHLORIDE 1 MG/ML
INJECTION INTRAMUSCULAR; INTRAVENOUS PRN
Status: DISCONTINUED | OUTPATIENT
Start: 2021-02-12 | End: 2021-02-12 | Stop reason: SDUPTHER

## 2021-02-12 RX ORDER — BUPIVACAINE HYDROCHLORIDE 2.5 MG/ML
INJECTION, SOLUTION EPIDURAL; INFILTRATION; INTRACAUDAL
Status: COMPLETED | OUTPATIENT
Start: 2021-02-12 | End: 2021-02-12

## 2021-02-12 RX ORDER — ONDANSETRON 2 MG/ML
4 INJECTION INTRAMUSCULAR; INTRAVENOUS PRN
Status: DISCONTINUED | OUTPATIENT
Start: 2021-02-12 | End: 2021-02-12 | Stop reason: HOSPADM

## 2021-02-12 RX ORDER — MORPHINE SULFATE 2 MG/ML
2 INJECTION, SOLUTION INTRAMUSCULAR; INTRAVENOUS EVERY 5 MIN PRN
Status: DISCONTINUED | OUTPATIENT
Start: 2021-02-12 | End: 2021-02-12 | Stop reason: HOSPADM

## 2021-02-12 RX ORDER — LIDOCAINE HYDROCHLORIDE 10 MG/ML
INJECTION, SOLUTION EPIDURAL; INFILTRATION; INTRACAUDAL; PERINEURAL
Status: COMPLETED
Start: 2021-02-12 | End: 2021-02-12

## 2021-02-12 RX ORDER — OXYCODONE HYDROCHLORIDE AND ACETAMINOPHEN 5; 325 MG/1; MG/1
1 TABLET ORAL EVERY 6 HOURS PRN
Qty: 28 TABLET | Refills: 0 | Status: SHIPPED | OUTPATIENT
Start: 2021-02-12 | End: 2021-02-19

## 2021-02-12 RX ORDER — FENTANYL CITRATE 50 UG/ML
INJECTION, SOLUTION INTRAMUSCULAR; INTRAVENOUS PRN
Status: DISCONTINUED | OUTPATIENT
Start: 2021-02-12 | End: 2021-02-12 | Stop reason: SDUPTHER

## 2021-02-12 RX ORDER — PROPOFOL 10 MG/ML
INJECTION, EMULSION INTRAVENOUS PRN
Status: DISCONTINUED | OUTPATIENT
Start: 2021-02-12 | End: 2021-02-12 | Stop reason: SDUPTHER

## 2021-02-12 RX ORDER — SCOLOPAMINE TRANSDERMAL SYSTEM 1 MG/1
PATCH, EXTENDED RELEASE TRANSDERMAL PRN
Status: DISCONTINUED | OUTPATIENT
Start: 2021-02-12 | End: 2021-02-12 | Stop reason: SDUPTHER

## 2021-02-12 RX ORDER — SCOLOPAMINE TRANSDERMAL SYSTEM 1 MG/1
PATCH, EXTENDED RELEASE TRANSDERMAL
Status: COMPLETED
Start: 2021-02-12 | End: 2021-02-12

## 2021-02-12 RX ORDER — KETOROLAC TROMETHAMINE 30 MG/ML
30 INJECTION, SOLUTION INTRAMUSCULAR; INTRAVENOUS ONCE
Status: COMPLETED | OUTPATIENT
Start: 2021-02-12 | End: 2021-02-12

## 2021-02-12 RX ORDER — LABETALOL HYDROCHLORIDE 5 MG/ML
INJECTION, SOLUTION INTRAVENOUS PRN
Status: DISCONTINUED | OUTPATIENT
Start: 2021-02-12 | End: 2021-02-12 | Stop reason: SDUPTHER

## 2021-02-12 RX ORDER — ONDANSETRON 2 MG/ML
INJECTION INTRAMUSCULAR; INTRAVENOUS PRN
Status: DISCONTINUED | OUTPATIENT
Start: 2021-02-12 | End: 2021-02-12 | Stop reason: SDUPTHER

## 2021-02-12 RX ORDER — MIDAZOLAM HYDROCHLORIDE 1 MG/ML
INJECTION INTRAMUSCULAR; INTRAVENOUS
Status: COMPLETED
Start: 2021-02-12 | End: 2021-02-12

## 2021-02-12 RX ORDER — SODIUM CHLORIDE, SODIUM LACTATE, POTASSIUM CHLORIDE, CALCIUM CHLORIDE 600; 310; 30; 20 MG/100ML; MG/100ML; MG/100ML; MG/100ML
INJECTION, SOLUTION INTRAVENOUS CONTINUOUS
Status: DISCONTINUED | OUTPATIENT
Start: 2021-02-12 | End: 2021-02-12 | Stop reason: HOSPADM

## 2021-02-12 RX ORDER — BUPIVACAINE HYDROCHLORIDE 2.5 MG/ML
INJECTION, SOLUTION EPIDURAL; INFILTRATION; INTRACAUDAL
Status: COMPLETED
Start: 2021-02-12 | End: 2021-02-12

## 2021-02-12 RX ORDER — MORPHINE SULFATE 2 MG/ML
1 INJECTION, SOLUTION INTRAMUSCULAR; INTRAVENOUS EVERY 5 MIN PRN
Status: DISCONTINUED | OUTPATIENT
Start: 2021-02-12 | End: 2021-02-12 | Stop reason: HOSPADM

## 2021-02-12 RX ORDER — OXYCODONE HYDROCHLORIDE AND ACETAMINOPHEN 5; 325 MG/1; MG/1
1 TABLET ORAL PRN
Status: DISCONTINUED | OUTPATIENT
Start: 2021-02-12 | End: 2021-02-12 | Stop reason: HOSPADM

## 2021-02-12 RX ORDER — ALBUTEROL SULFATE 2.5 MG/3ML
2.5 SOLUTION RESPIRATORY (INHALATION) ONCE
Status: COMPLETED | OUTPATIENT
Start: 2021-02-12 | End: 2021-02-12

## 2021-02-12 RX ORDER — PROMETHAZINE HYDROCHLORIDE 25 MG/ML
6.25 INJECTION, SOLUTION INTRAMUSCULAR; INTRAVENOUS
Status: DISCONTINUED | OUTPATIENT
Start: 2021-02-12 | End: 2021-02-12 | Stop reason: HOSPADM

## 2021-02-12 RX ORDER — OXYCODONE HYDROCHLORIDE AND ACETAMINOPHEN 5; 325 MG/1; MG/1
2 TABLET ORAL PRN
Status: DISCONTINUED | OUTPATIENT
Start: 2021-02-12 | End: 2021-02-12 | Stop reason: HOSPADM

## 2021-02-12 RX ORDER — LIDOCAINE HYDROCHLORIDE 20 MG/ML
INJECTION, SOLUTION EPIDURAL; INFILTRATION; INTRACAUDAL; PERINEURAL PRN
Status: DISCONTINUED | OUTPATIENT
Start: 2021-02-12 | End: 2021-02-12 | Stop reason: SDUPTHER

## 2021-02-12 RX ORDER — SODIUM CHLORIDE 0.9 % (FLUSH) 0.9 %
10 SYRINGE (ML) INJECTION EVERY 12 HOURS SCHEDULED
Status: DISCONTINUED | OUTPATIENT
Start: 2021-02-12 | End: 2021-02-12 | Stop reason: HOSPADM

## 2021-02-12 RX ORDER — ALBUTEROL SULFATE 2.5 MG/3ML
SOLUTION RESPIRATORY (INHALATION)
Status: COMPLETED
Start: 2021-02-12 | End: 2021-02-12

## 2021-02-12 RX ORDER — DEXAMETHASONE SODIUM PHOSPHATE 4 MG/ML
INJECTION, SOLUTION INTRA-ARTICULAR; INTRALESIONAL; INTRAMUSCULAR; INTRAVENOUS; SOFT TISSUE PRN
Status: DISCONTINUED | OUTPATIENT
Start: 2021-02-12 | End: 2021-02-12 | Stop reason: SDUPTHER

## 2021-02-12 RX ADMIN — LABETALOL HYDROCHLORIDE 10 MG: 5 INJECTION, SOLUTION INTRAVENOUS at 13:15

## 2021-02-12 RX ADMIN — HYDROMORPHONE HYDROCHLORIDE 1 MG: 1 INJECTION, SOLUTION INTRAMUSCULAR; INTRAVENOUS; SUBCUTANEOUS at 12:34

## 2021-02-12 RX ADMIN — BUPIVACAINE HYDROCHLORIDE 20 ML: 2.5 INJECTION, SOLUTION EPIDURAL; INFILTRATION; INTRACAUDAL; PERINEURAL at 11:43

## 2021-02-12 RX ADMIN — LABETALOL HYDROCHLORIDE 10 MG: 5 INJECTION, SOLUTION INTRAVENOUS at 13:10

## 2021-02-12 RX ADMIN — HYDROMORPHONE HYDROCHLORIDE 0.5 MG: 1 INJECTION, SOLUTION INTRAMUSCULAR; INTRAVENOUS; SUBCUTANEOUS at 14:50

## 2021-02-12 RX ADMIN — DEXAMETHASONE SODIUM PHOSPHATE 10 MG: 4 INJECTION, SOLUTION INTRAMUSCULAR; INTRAVENOUS at 12:17

## 2021-02-12 RX ADMIN — FENTANYL CITRATE 50 MCG: 50 INJECTION INTRAMUSCULAR; INTRAVENOUS at 12:00

## 2021-02-12 RX ADMIN — LIDOCAINE HYDROCHLORIDE 2 ML: 10 INJECTION, SOLUTION EPIDURAL; INFILTRATION; INTRACAUDAL; PERINEURAL at 11:22

## 2021-02-12 RX ADMIN — LIDOCAINE HYDROCHLORIDE 3 ML: 20 INJECTION, SOLUTION EPIDURAL; INFILTRATION; INTRACAUDAL; PERINEURAL at 12:04

## 2021-02-12 RX ADMIN — KETOROLAC TROMETHAMINE 30 MG: 30 INJECTION, SOLUTION INTRAMUSCULAR; INTRAVENOUS at 14:57

## 2021-02-12 RX ADMIN — BUPIVACAINE HYDROCHLORIDE 20 ML: 2.5 INJECTION, SOLUTION EPIDURAL; INFILTRATION; INTRACAUDAL; PERINEURAL at 11:44

## 2021-02-12 RX ADMIN — PROPOFOL 200 MG: 10 INJECTION, EMULSION INTRAVENOUS at 12:04

## 2021-02-12 RX ADMIN — SCOPOLAMINE 1 PATCH: 1 PATCH TRANSDERMAL at 11:55

## 2021-02-12 RX ADMIN — ALBUTEROL SULFATE 2.5 MG: 2.5 SOLUTION RESPIRATORY (INHALATION) at 11:23

## 2021-02-12 RX ADMIN — ROCURONIUM BROMIDE 10 MG: 10 INJECTION, SOLUTION INTRAVENOUS at 12:29

## 2021-02-12 RX ADMIN — PROPOFOL 60 MG: 10 INJECTION, EMULSION INTRAVENOUS at 13:28

## 2021-02-12 RX ADMIN — ONDANSETRON 4 MG: 2 INJECTION, SOLUTION INTRAMUSCULAR; INTRAVENOUS at 12:17

## 2021-02-12 RX ADMIN — HYDROMORPHONE HYDROCHLORIDE 0.5 MG: 1 INJECTION, SOLUTION INTRAMUSCULAR; INTRAVENOUS; SUBCUTANEOUS at 14:42

## 2021-02-12 RX ADMIN — PROPOFOL 30 MG: 10 INJECTION, EMULSION INTRAVENOUS at 12:30

## 2021-02-12 RX ADMIN — FENTANYL CITRATE 50 MCG: 50 INJECTION INTRAMUSCULAR; INTRAVENOUS at 12:17

## 2021-02-12 RX ADMIN — Medication 2 G: at 12:00

## 2021-02-12 RX ADMIN — ROCURONIUM BROMIDE 20 MG: 10 INJECTION, SOLUTION INTRAVENOUS at 12:04

## 2021-02-12 RX ADMIN — HYDROMORPHONE HYDROCHLORIDE 0.5 MG: 1 INJECTION, SOLUTION INTRAMUSCULAR; INTRAVENOUS; SUBCUTANEOUS at 14:34

## 2021-02-12 RX ADMIN — MIDAZOLAM 2 MG: 1 INJECTION INTRAMUSCULAR; INTRAVENOUS at 11:32

## 2021-02-12 RX ADMIN — FENTANYL CITRATE 50 MCG: 50 INJECTION INTRAMUSCULAR; INTRAVENOUS at 12:04

## 2021-02-12 RX ADMIN — FAMOTIDINE 20 MG: 10 INJECTION, SOLUTION INTRAVENOUS at 11:55

## 2021-02-12 RX ADMIN — PROPOFOL 50 MG: 10 INJECTION, EMULSION INTRAVENOUS at 12:32

## 2021-02-12 RX ADMIN — FENTANYL CITRATE 50 MCG: 50 INJECTION INTRAMUSCULAR; INTRAVENOUS at 12:29

## 2021-02-12 RX ADMIN — SODIUM CHLORIDE, POTASSIUM CHLORIDE, SODIUM LACTATE AND CALCIUM CHLORIDE: 600; 310; 30; 20 INJECTION, SOLUTION INTRAVENOUS at 11:24

## 2021-02-12 RX ADMIN — SODIUM CHLORIDE, POTASSIUM CHLORIDE, SODIUM LACTATE AND CALCIUM CHLORIDE: 600; 310; 30; 20 INJECTION, SOLUTION INTRAVENOUS at 13:26

## 2021-02-12 ASSESSMENT — PULMONARY FUNCTION TESTS
PIF_VALUE: 13
PIF_VALUE: 3
PIF_VALUE: 23
PIF_VALUE: 7
PIF_VALUE: 25
PIF_VALUE: 17
PIF_VALUE: 21
PIF_VALUE: 27
PIF_VALUE: 1
PIF_VALUE: 1
PIF_VALUE: 2
PIF_VALUE: 5
PIF_VALUE: 16
PIF_VALUE: 19
PIF_VALUE: 8
PIF_VALUE: 20
PIF_VALUE: 22
PIF_VALUE: 3
PIF_VALUE: 21
PIF_VALUE: 22
PIF_VALUE: 19
PIF_VALUE: 26
PIF_VALUE: 8
PIF_VALUE: 22
PIF_VALUE: 23
PIF_VALUE: 0
PIF_VALUE: 19
PIF_VALUE: 24
PIF_VALUE: 20
PIF_VALUE: 2
PIF_VALUE: 25
PIF_VALUE: 2
PIF_VALUE: 24
PIF_VALUE: 21
PIF_VALUE: 1
PIF_VALUE: 22
PIF_VALUE: 17
PIF_VALUE: 22
PIF_VALUE: 8
PIF_VALUE: 3
PIF_VALUE: 22
PIF_VALUE: 3
PIF_VALUE: 22
PIF_VALUE: 0
PIF_VALUE: 22
PIF_VALUE: 3
PIF_VALUE: 1
PIF_VALUE: 2
PIF_VALUE: 21
PIF_VALUE: 22
PIF_VALUE: 19
PIF_VALUE: 3
PIF_VALUE: 21
PIF_VALUE: 4
PIF_VALUE: 3
PIF_VALUE: 21
PIF_VALUE: 3
PIF_VALUE: 21
PIF_VALUE: 21
PIF_VALUE: 3
PIF_VALUE: 7
PIF_VALUE: 3
PIF_VALUE: 2
PIF_VALUE: 3
PIF_VALUE: 8
PIF_VALUE: 8
PIF_VALUE: 7
PIF_VALUE: 25
PIF_VALUE: 20
PIF_VALUE: 25
PIF_VALUE: 24
PIF_VALUE: 20
PIF_VALUE: 2
PIF_VALUE: 20
PIF_VALUE: 20
PIF_VALUE: 2

## 2021-02-12 ASSESSMENT — PAIN DESCRIPTION - PAIN TYPE: TYPE: SURGICAL PAIN

## 2021-02-12 ASSESSMENT — PAIN DESCRIPTION - LOCATION
LOCATION: ANKLE

## 2021-02-12 ASSESSMENT — PAIN DESCRIPTION - DESCRIPTORS
DESCRIPTORS: THROBBING
DESCRIPTORS: THROBBING

## 2021-02-12 ASSESSMENT — PAIN - FUNCTIONAL ASSESSMENT: PAIN_FUNCTIONAL_ASSESSMENT: 0-10

## 2021-02-12 ASSESSMENT — PAIN DESCRIPTION - ORIENTATION
ORIENTATION: LEFT
ORIENTATION: LEFT

## 2021-02-12 ASSESSMENT — PAIN SCALES - GENERAL
PAINLEVEL_OUTOF10: 10
PAINLEVEL_OUTOF10: 10

## 2021-02-12 NOTE — ANESTHESIA PRE PROCEDURE
Department of Anesthesiology  Preprocedure Note       Name:  Sagar Anguiano   Age:  48 y.o.  :  1965                                          MRN:  [id number]         Date:  2021      Surgeon: Suzette Jimenez):  Anders Hamman, DO    Procedure: Procedure(s):  LEFT ANKLE OPEN REDUCTION INTERNAL FIXATION --BLOCK--    Medications prior to admission:   Prior to Admission medications    Medication Sig Start Date End Date Taking? Authorizing Provider   oxyCODONE-acetaminophen (PERCOCET) 5-325 MG per tablet Take 2 tablets by mouth every 4 hours as needed for Pain.    Yes Historical Provider, MD   SAVELLA 25 MG TABS TAKE 2 TABLETS BY MOUTH 2 TIMES DAILY 21   JASON Rahman CNP   pramipexole (MIRAPEX) 0.25 MG tablet Take 1 tablet by mouth 3 times daily 21   JASON Rahman CNP   montelukast (SINGULAIR) 10 MG tablet Take 1 tablet by mouth nightly 21   JASON Rahman CNP   LINZESS 145 MCG capsule TAKE 1 CAPSULE BY MOUTH EVERY MORNING (BEFORE BREAKFAST) 20   JASON Rahman CNP   albuterol sulfate HFA (PROAIR HFA) 108 (90 Base) MCG/ACT inhaler USE 1-2 PUFFS EVERY 4    HOURS AS NEEDED FORWHEEZING 20   JASON Rahman CNP   meloxicam (MOBIC) 15 MG tablet Take 1 tablet by mouth daily 20   JASON Rahman CNP   prazosin (MINIPRESS) 1 MG capsule  10/22/20   Historical Provider, MD   Omega-3 Fatty Acids (FISH OIL PO) Take by mouth    Historical Provider, MD   buPROPion (WELLBUTRIN) 75 MG tablet Take 75 mg by mouth daily  10/22/20   Historical Provider, MD   hydrOXYzine (VISTARIL) 25 MG capsule Take 25 mg by mouth 2 times daily  10/22/20   Historical Provider, MD   ondansetron (ZOFRAN) 4 MG tablet TAKE 1 TABLET BY MOUTH EVERY 8 HOURS AS NEEDED FOR NAUSEA AND VOMITING 10/29/20   JASON Rahman CNP   lamoTRIgine (LAMICTAL) 100 MG tablet TAKE 1 TABLET BY MOUTH TWICE A DAY 20   Historical Provider, MD   sertraline (ZOLOFT) 50 MG tablet Take 100 mg by mouth daily     Historical Provider, MD   aspirin 81 MG EC tablet Take 81 mg by mouth daily    Historical Provider, MD   Multiple Vitamin (MULTI-VITAMIN PO) Take by mouth    Historical Provider, MD   VITAMIN D PO Take by mouth    Historical Provider, MD   Ascorbic Acid (VITAMIN C PO) Take by mouth    Historical Provider, MD   Coenzyme Q10 (COQ10 PO) Take by mouth    Historical Provider, MD   acetaminophen (TYLENOL) 325 MG tablet Take 2 tablets by mouth every 6 hours as needed for Pain 2/13/18   Daljit Johnston DO   gabapentin (NEURONTIN) 400 MG capsule Take 800 mg by mouth 3 times daily. 2/2/17   Historical Provider, MD   OLANZapine (ZYPREXA) 20 MG tablet nightly  2/2/17   Historical Provider, MD       Current medications:    Current Facility-Administered Medications   Medication Dose Route Frequency Provider Last Rate Last Admin    lidocaine PF 1 % injection             midazolam (VERSED) 2 MG/2ML injection             bupivacaine (PF) (MARCAINE) 0.25 % injection             ceFAZolin (ANCEF) 2000 mg in sterile water 20 mL IV syringe  2,000 mg Intravenous Once Letty Villela DO        lactated ringers infusion   Intravenous Continuous Shine Kirk MD        sodium chloride flush 0.9 % injection 10 mL  10 mL Intravenous 2 times per day Shine Kirk MD        sodium chloride flush 0.9 % injection 10 mL  10 mL Intravenous PRN Shine Kirk MD        famotidine (PEPCID) injection 20 mg  20 mg Intravenous Once Shine Kirk MD           Allergies:     Allergies   Allergen Reactions    Calamine Hives    Diphenhydramine     Diphenhydramine Hcl Hives    Dust Mite Extract Other (See Comments)    Macadamia Nut Oil Other (See Comments)    Pramoxine-Calamine Hives    Seasonal      Other reaction(s): Cough    Erythromycin Palpitations       Problem List:    Patient Active Problem List   Diagnosis Code    Asthma J45.909    Irritable bowel syndrome K58.9    Hyperlipidemia E78.5    Bipolar disorder ([facility] F31.9    Schizophrenia ([facility] F20.9    Fibromyalgia M79.7    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 N13.9    Allergic rhinitis J30.9    Obsessive-compulsive disorder F42.9    Sleep apnea G47.30    Obesity E66.9    History of tobacco use Z87.891    Club nail R68.3    Primary osteoarthritis of left knee M17.12    GERD (gastroesophageal reflux disease) K21.9       Past Medical History:        Diagnosis Date    Asthma     Bipolar disorder     Carpal tunnel syndrome     COPD (chronic obstructive pulmonary disease) (HCC)     Fibromyalgia     GERD (gastroesophageal reflux disease)     Hyperlipidemia     Irritable bowel syndrome     Manic depression (HCC)     PONV (postoperative nausea and vomiting)     PTSD (post-traumatic stress disorder)     Schizophrenia        Past Surgical History:        Procedure Laterality Date    APPENDECTOMY      BACK SURGERY      DISCECTOMY L3    BREAST BIOPSY Right     LUMPECTOMY, BENIGN    CARPAL TUNNEL RELEASE Bilateral     MULTIPLE    HIP SURGERY Left     BONE SPUR    KNEE ARTHROCENTESIS Left 10/27/2017    left knee medial compartment arthroplasty    KNEE ARTHROPLASTY Right 2016    RIGHT PARTIAL KNEE ARTHROPLASTY    KNEE ARTHROPLASTY Right 2017    right Knee open medial compartment arthroplasty evaluation, irrigation and debridement     KNEE ARTHROSCOPY Left     OVARIAN CYST SURGERY      right     TONSILLECTOMY AND ADENOIDECTOMY      TUBAL LIGATION      R TUBELECTOMY        Social History:    Social History     Tobacco Use    Smoking status: Former Smoker     Packs/day: 0.50     Types: Cigarettes     Quit date: 10/17/2007     Years since quittin.3    Smokeless tobacco: Never Used   Substance Use Topics    Alcohol use: Yes     Comment: rare                                Counseling given: Not Answered      Vital Signs (Current):   Vitals:    21 1045   BP: (!) 135/98   Pulse: 84   Resp: 12 Temp: 98 °F (36.7 °C)   TempSrc: Temporal   SpO2: 100%   Weight: 190 lb (86.2 kg)   Height: 5' 2\" (1.575 m)                                              BP Readings from Last 3 Encounters:   02/12/21 (!) 135/98   02/04/21 (!) 160/90   11/05/20 118/82       NPO Status: Time of last liquid consumption: 2200                        Time of last solid consumption: 1930                        Date of last liquid consumption: 02/11/21                        Date of last solid food consumption: 02/11/21    BMI:   Wt Readings from Last 3 Encounters:   02/12/21 190 lb (86.2 kg)   02/04/21 190 lb (86.2 kg)   12/14/20 200 lb (90.7 kg)     Body mass index is 34.75 kg/m². CBC:   Lab Results   Component Value Date    WBC 8.1 06/12/2020    RBC 4.40 06/12/2020    HGB 14.3 06/12/2020    HCT 43.7 06/12/2020    MCV 99.4 06/12/2020    RDW 13.8 06/12/2020     06/12/2020       CMP:   Lab Results   Component Value Date     06/12/2020    K 4.7 06/12/2020     06/12/2020    CO2 27 06/12/2020    BUN 9 06/12/2020    CREATININE 0.8 06/12/2020    GFRAA >60 06/12/2020    GFRAA >60 08/19/2010    AGRATIO 2.2 06/12/2020    LABGLOM >60 06/12/2020    GLUCOSE 104 06/12/2020    PROT 7.0 06/12/2020    CALCIUM 9.8 06/12/2020    BILITOT <0.2 06/12/2020    ALKPHOS 96 06/12/2020    AST 21 06/12/2020    ALT 20 06/12/2020       POC Tests: No results for input(s): POCGLU, POCNA, POCK, POCCL, POCBUN, POCHEMO, POCHCT in the last 72 hours.     Coags:   Lab Results   Component Value Date    PROTIME 11.1 02/17/2017    INR 0.98 02/17/2017    APTT 28.0 02/17/2017       HCG (If Applicable):   Lab Results   Component Value Date    PREGTESTUR Negative 10/27/2017        ABGs: No results found for: PHART, PO2ART, BLA3LIO, VAH1XBU, BEART, I7REFIGI     Type & Screen (If Applicable):  No results found for: LABABO, LABRH    Drug/Infectious Status (If Applicable):  No results found for: HIV, HEPCAB    COVID-19 Screening (If Applicable):   Lab Results Component Value Date    COVID19 Not Detected 02/10/2021         Anesthesia Evaluation  Patient summary reviewed and Nursing notes reviewed   history of anesthetic complications: PONV. Airway: Mallampati: I  TM distance: >3 FB   Neck ROM: full  Mouth opening: > = 3 FB Dental:    (+) edentulous      Pulmonary:normal exam  breath sounds clear to auscultation  (+) COPD:  sleep apnea:  asthma:                            Cardiovascular:Negative CV ROS            Rhythm: regular  Rate: normal                    Neuro/Psych:   (+) neuromuscular disease:, psychiatric history:depression/anxiety              ROS comment: Schizophrenia GI/Hepatic/Renal:   (+) GERD: well controlled,           Endo/Other: Negative Endo/Other ROS                    Abdominal:   (+) obese,         Vascular: negative vascular ROS. Anesthesia Plan      general     ASA 3     (Blocks)  Induction: intravenous. MIPS: Postoperative opioids intended and Prophylactic antiemetics administered. Anesthetic plan and risks discussed with patient. Plan discussed with CRNA.                   Eliane Pollock MD   2/12/2021

## 2021-02-12 NOTE — PROGRESS NOTES
Pt continues to c/o pain \"11\" on pain scale, also reports improvement of pain. Pt appears more comfortable.

## 2021-02-12 NOTE — OP NOTE
Kayode Louise (1965)    Date of Surgery- 2/12/2021      PREOPERATIVE DIAGNOSIS:  Left ankle fracture. POSTOPERATIVE DIAGNOSIS:  Left ankle fracture. PROCEDURES PERFORMED:    1. Left ankle open reduction with internal fixation of medial and lateral malleolus (CPT 88495). 2.  Intraoperative use of x-ray with interpretation left ankle (CPT E2581632)     ASSISTANT:   Surgical Assistant: Mata Gregg  Scrub Person First: Naren Ochoar      ANESTHESIA:  General with a regional block. TOURNIQUET TIME:  65 minutes. IMPLANTS USED:  [place] distal fibula locking plate, 3.0 cannulated screws 38mm, 40mm      Estimated blood loss: Minimal    INDICATIONS FOR OPERATION:  The patient fell and sustained the aforementioned injury. She chose to proceed with operative intervention. At no time were guarantees implied or stated. DESCRIPTION OF OPERATION:  The patient was seen in the holding area, the appropriate extremity marked with an indelible pen. Regional anesthesia was administered by anesthesia. The patient was taken to the operative suite, identified, and placed on the OR table. General anesthesia was administered. A sandbag was placed under the Left hip. A well-padded tourniquet was placed above the Left thigh. The Left lower extremity was elevated, prepped with ChloraPrep from the toes to knee, draped in normal sterile fashion. The patient received antibiotics prior to incision. The leg was elevated, exsanguinated, the tourniquet inflated to 350 mmHg. An incision was made laterally, centered over the lateral malleolus fracture. The incision was carried through subcutaneous tissue. The fracture was identified. Soft tissue debris was removed. The fracture was reduced and 1interfragmentary screw placed in a compression fashion from anterior to posterior. A 6-hole, Rebecca distal fibula locking plate was then placed and secured proximally and distally.   Intraoperative fluoroscopic images were obtained. This confirmed satisfactory placement of hardware and a well-reduced mortise. Attention was turned to the medial side. Incision was made over the medial malleolus. The incision was carried through subcutaneous tissue, fracture identified and reduced. It was held in place with pointed reduction forceps. Two 3.0 mm partially threaded 38 & 40 mm length screws were placed to fix the fracture in compression using a compression technique. The fracture was well reduced. Again, multiplanar C-arm fluoroscopy images were used to confirm satisfactory placement of all hardware and a well-reduced mortise. Final fluoroscopic images conformed that the mortise was reduced and stable to lateral stress and external rotation. The wounds were copiously irrigated. Hemostasis was obtained with electrocautery, the subcutaneous tissue closed with 0 and 2-0 Vicryl, skin closed with 4-0 Nylon. Sterile dressings and a well-padded splint were applied. The patient was awakened and taken to the postoperative area in stable condition. All sponge and needle counts were correct.        I spoke with the family postoperatively        [facility]      ADDENDUM  Intraoperatively, 3 views Left ankle were obtained to confirm satisfactory reduction of the fractures and the mortise

## 2021-02-12 NOTE — H&P
I have performed the history and physical and placed it in the paper chart. I have reviewed with the patient and/or family the risks, benefits, and alternatives to the procedure.     Past Medical History:   Diagnosis Date    Asthma     Bipolar disorder     Carpal tunnel syndrome     COPD (chronic obstructive pulmonary disease) (HCC)     Fibromyalgia     GERD (gastroesophageal reflux disease)     Hyperlipidemia     Irritable bowel syndrome     Manic depression (HCC)     PONV (postoperative nausea and vomiting)     PTSD (post-traumatic stress disorder)     Schizophrenia        Past Surgical History:   Procedure Laterality Date    APPENDECTOMY      BACK SURGERY      DISCECTOMY L3    BREAST BIOPSY Right     LUMPECTOMY, BENIGN    CARPAL TUNNEL RELEASE Bilateral     MULTIPLE    HIP SURGERY Left     BONE SPUR    KNEE ARTHROCENTESIS Left 10/27/2017    left knee medial compartment arthroplasty    KNEE ARTHROPLASTY Right 12/09/2016    RIGHT PARTIAL KNEE ARTHROPLASTY    KNEE ARTHROPLASTY Right 02/17/2017    right Knee open medial compartment arthroplasty evaluation, irrigation and debridement     KNEE ARTHROSCOPY Left     OVARIAN CYST SURGERY      right     TONSILLECTOMY AND ADENOIDECTOMY      TUBAL LIGATION      R TUBELECTOMY        Scheduled Meds:   ceFAZolin  2,000 mg Intravenous Once    sodium chloride flush  10 mL Intravenous 2 times per day    famotidine (PEPCID) injection  20 mg Intravenous Once     Continuous Infusions:   lactated ringers 125 mL/hr at 02/12/21 1124     PRN Meds:.sodium chloride flush, meperidine, HYDROmorphone, HYDROmorphone, morphine, morphine, oxyCODONE-acetaminophen **OR** oxyCODONE-acetaminophen, ondansetron, promethazine, labetalol, hydrALAZINE    Allergies   Allergen Reactions    Calamine Hives    Diphenhydramine     Diphenhydramine Hcl Hives    Dust Mite Extract Other (See Comments)    Macadamia Nut Oil Other (See Comments)    Pramoxine-Calamine Hives    Seasonal      Other reaction(s): Cough    Erythromycin Palpitations       Vitals:    02/12/21 1140   BP: 132/85   Pulse: 90   Resp: 15   Temp:    SpO2: 99%         The patient was counseled at length about the risks of zaid Covid-19 during their perioperative period and any recovery window from their procedure. The patient was made aware that zaid Covid-19  may worsen their prognosis for recovering from their procedure  and lend to a higher morbidity and/or mortality risk. All material risks, benefits, and reasonable alternatives including postponing the procedure were discussed. The patient does wish to proceed with the procedure at this time.             Jaz De Leon DO  2/12/2021

## 2021-02-12 NOTE — ANESTHESIA PROCEDURE NOTES
Peripheral Block    Patient location during procedure: PACU  Start time: 2/12/2021 11:32 AM  End time: 2/12/2021 11:37 AM  Staffing  Anesthesiologist: Gisella Echevarria MD  Preanesthetic Checklist  Completed: patient identified, IV checked, site marked, risks and benefits discussed, surgical consent, monitors and equipment checked, pre-op evaluation, timeout performed, anesthesia consent given, oxygen available and patient being monitored  Peripheral Block  Patient position: supine  Prep: ChloraPrep  Patient monitoring: cardiac monitor, continuous pulse ox, frequent blood pressure checks and IV access  Block type: Femoral  Laterality: left  Injection technique: single-shot  Guidance: ultrasound guided  Local infiltration: lidocaine  Infiltration strength: 1 %  Adductor canal  Provider prep: mask and sterile gloves  Local infiltration: lidocaine  Needle  Needle type: combined needle/nerve stimulator   Needle gauge: 22 G  Needle length: 5 cm  Needle localization: ultrasound guidance  Assessment  Injection assessment: negative aspiration for heme, no paresthesia on injection and local visualized surrounding nerve on ultrasound  Slow fractionated injection: yes  Hemodynamics: stable  Additional Notes  Sartorius and Vastus Medialis Muscle, Femoral artery and Saphenous nerve are identified; the tip of the needle and the spread of the local anesthetic around the Saphenous nerve are visualized. The Saphenous nerve appeared to be anatomically normal and there were no abnormal pathologically findings seen.    Medications Administered  Bupivacaine (MARCAINE) PF injection 0.25%, 20 mL  Reason for block: post-op pain management

## 2021-02-12 NOTE — ANESTHESIA PROCEDURE NOTES
Peripheral Block    Patient location during procedure: pre-op  Start time: 2/12/2021 11:39 AM  End time: 2/12/2021 11:42 AM  Staffing  Performed: anesthesiologist   Anesthesiologist: Montez Flores MD  Preanesthetic Checklist  Completed: patient identified, IV checked, site marked, risks and benefits discussed, surgical consent, monitors and equipment checked, pre-op evaluation, timeout performed, anesthesia consent given, oxygen available and patient being monitored  Peripheral Block  Prep: ChloraPrep  Patient monitoring: cardiac monitor, continuous pulse ox, frequent blood pressure checks and IV access  Block type: Sciatic  Laterality: left  Injection technique: single-shot  Guidance: ultrasound guided  Local infiltration: lidocaine  Infiltration strength: 1 %  Dose: 3 mL  Popliteal  Provider prep: mask and sterile gloves  Local infiltration: lidocaine  Needle  Needle gauge: 21 G  Needle length: 10 cm  Needle localization: ultrasound guidance  Assessment  Injection assessment: negative aspiration for heme, no paresthesia on injection and local visualized surrounding nerve on ultrasound  Paresthesia pain: none  Slow fractionated injection: yes  Hemodynamics: stable  Additional Notes  Immediately prior to procedure a \"time out\" was called to verify the correct patient, allergies, laterality, procedure and equipment. Time out performed with RN    Biceps Femoris muscle (long head), Vastus lateralis muscle, Sciatic nerve (Tibia and Common Peroneal Nerves) and Popliteal artery are identified; the tip of the needle and the spread of the local anesthetic around the Tibial and Common Peroneal Nerve are visualized. The Sciatic Nerve (Tibia and Common Peroneal Nerve) appeared to be anatomically normal and there were no abnormal pathologically findings seen.          Medications Administered  Bupivacaine (MARCAINE) PF injection 0.25%, 20 mL  Reason for block: post-op pain management and at surgeon's request

## 2021-02-15 NOTE — ANESTHESIA POSTPROCEDURE EVALUATION
Department of Anesthesiology  Postprocedure Note    Patient: Ginny Orlando  MRN: [id number]  YOB: 1965  Date of evaluation: 2/15/2021  Time:  7:04 AM     Procedure Summary     Date: 02/12/21 Room / Location: [place] [address] / [facility]    Anesthesia Start: 1200 Anesthesia Stop: 9765    Procedure: LEFT ANKLE OPEN REDUCTION INTERNAL FIXATION   (Left Ankle) Diagnosis: (LEFT ANKLE FRACTURE)    Surgeons: Justin Loja DO Responsible Provider: Jeffry Hernández MD    Anesthesia Type: general ASA Status: 3          Anesthesia Type: general    Armando Phase I: Armando Score: 10    Armando Phase II: Armando Score: 10    Last vitals: Reviewed and per EMR flowsheets.        Anesthesia Post Evaluation    Patient location during evaluation: PACU  Patient participation: complete - patient participated  Level of consciousness: awake and alert  Pain score: 0  Airway patency: patent  Nausea & Vomiting: no nausea and no vomiting  Complications: no  Cardiovascular status: blood pressure returned to baseline  Respiratory status: acceptable  Hydration status: stable

## 2021-02-22 DIAGNOSIS — S82.892D CLOSED FRACTURE OF LEFT ANKLE WITH ROUTINE HEALING, SUBSEQUENT ENCOUNTER: Primary | ICD-10-CM

## 2021-02-22 RX ORDER — ACETAMINOPHEN 325 MG/1
650 TABLET ORAL EVERY 6 HOURS PRN
Qty: 90 TABLET | Refills: 0 | Status: SHIPPED | OUTPATIENT
Start: 2021-02-22

## 2021-02-22 RX ORDER — OXYCODONE HYDROCHLORIDE 10 MG/1
10 TABLET ORAL EVERY 6 HOURS PRN
Qty: 28 TABLET | Refills: 0 | Status: SHIPPED | OUTPATIENT
Start: 2021-02-22 | End: 2021-03-01

## 2021-02-22 RX ORDER — IBUPROFEN 600 MG/1
600 TABLET ORAL EVERY 8 HOURS PRN
Qty: 30 TABLET | Refills: 0 | Status: SHIPPED | OUTPATIENT
Start: 2021-02-22 | End: 2022-02-22

## 2021-02-25 RX ORDER — ONDANSETRON 4 MG/1
TABLET, FILM COATED ORAL
Qty: 30 TABLET | Refills: 0 | Status: SHIPPED | OUTPATIENT
Start: 2021-02-25 | End: 2021-03-29

## 2021-02-25 NOTE — TELEPHONE ENCOUNTER
Pharmacy calling for patient, she is requesting refill of zofran   Future appt 5/6    Last appt 11/5

## 2021-03-01 DIAGNOSIS — S82.892A CLOSED LEFT ANKLE FRACTURE, INITIAL ENCOUNTER: Primary | ICD-10-CM

## 2021-03-01 RX ORDER — OXYCODONE HYDROCHLORIDE 5 MG/1
5 TABLET ORAL EVERY 6 HOURS PRN
Qty: 28 TABLET | Refills: 0 | Status: SHIPPED | OUTPATIENT
Start: 2021-03-01 | End: 2021-03-08

## 2021-03-05 DIAGNOSIS — S82.892A CLOSED LEFT ANKLE FRACTURE, INITIAL ENCOUNTER: Primary | ICD-10-CM

## 2021-03-05 RX ORDER — OXYCODONE HYDROCHLORIDE AND ACETAMINOPHEN 5; 325 MG/1; MG/1
1 TABLET ORAL EVERY 6 HOURS PRN
Qty: 28 TABLET | Refills: 0 | Status: SHIPPED | OUTPATIENT
Start: 2021-03-05 | End: 2021-03-12

## 2021-03-12 DIAGNOSIS — S82.892A CLOSED LEFT ANKLE FRACTURE, INITIAL ENCOUNTER: Primary | ICD-10-CM

## 2021-03-12 RX ORDER — OXYCODONE HYDROCHLORIDE AND ACETAMINOPHEN 5; 325 MG/1; MG/1
1 TABLET ORAL EVERY 6 HOURS PRN
Qty: 28 TABLET | Refills: 0 | Status: SHIPPED | OUTPATIENT
Start: 2021-03-12 | End: 2021-03-19

## 2021-03-18 DIAGNOSIS — S82.892A CLOSED LEFT ANKLE FRACTURE, INITIAL ENCOUNTER: Primary | ICD-10-CM

## 2021-03-18 RX ORDER — HYDROCODONE BITARTRATE AND ACETAMINOPHEN 5; 325 MG/1; MG/1
1 TABLET ORAL EVERY 6 HOURS PRN
Qty: 28 TABLET | Refills: 0 | Status: SHIPPED | OUTPATIENT
Start: 2021-03-18 | End: 2021-03-25

## 2021-03-23 DIAGNOSIS — S82.892A CLOSED LEFT ANKLE FRACTURE, INITIAL ENCOUNTER: Primary | ICD-10-CM

## 2021-03-23 RX ORDER — HYDROCODONE BITARTRATE AND ACETAMINOPHEN 5; 325 MG/1; MG/1
1 TABLET ORAL EVERY 6 HOURS PRN
Qty: 28 TABLET | Refills: 0 | Status: SHIPPED | OUTPATIENT
Start: 2021-03-23 | End: 2021-03-30

## 2021-03-29 RX ORDER — ONDANSETRON 4 MG/1
TABLET, FILM COATED ORAL
Qty: 30 TABLET | Refills: 0 | Status: SHIPPED | OUTPATIENT
Start: 2021-03-29 | End: 2021-04-26

## 2021-03-30 DIAGNOSIS — S82.892A CLOSED LEFT ANKLE FRACTURE, INITIAL ENCOUNTER: Primary | ICD-10-CM

## 2021-03-30 RX ORDER — ACETAMINOPHEN AND CODEINE PHOSPHATE 300; 30 MG/1; MG/1
1 TABLET ORAL EVERY 6 HOURS PRN
Qty: 28 TABLET | Refills: 0 | Status: SHIPPED | OUTPATIENT
Start: 2021-03-30 | End: 2021-04-06

## 2021-04-15 ENCOUNTER — TELEPHONE (OUTPATIENT)
Dept: FAMILY MEDICINE CLINIC | Age: 56
End: 2021-04-15

## 2021-04-15 NOTE — TELEPHONE ENCOUNTER
Patient states she broke her ankle and she is requesting a handicap placard It looks like this happened in Feb.4

## 2021-04-21 RX ORDER — PRAMIPEXOLE DIHYDROCHLORIDE 0.25 MG/1
0.25 TABLET ORAL 3 TIMES DAILY
Qty: 90 TABLET | Refills: 1 | Status: SHIPPED | OUTPATIENT
Start: 2021-04-21 | End: 2021-06-28

## 2021-04-21 NOTE — TELEPHONE ENCOUNTER
Future appt scheduled 12/11/2020           Last appt 05/06/2021      Last Written 01/06/2021    pramipexole (MIRAPEX) 0.25 MG tablet  #90  1 RF         Refilled medication per verbal order from provider.

## 2021-04-26 RX ORDER — ONDANSETRON 4 MG/1
TABLET, FILM COATED ORAL
Qty: 30 TABLET | Refills: 3 | Status: SHIPPED | OUTPATIENT
Start: 2021-04-26 | Stop reason: SDUPTHER

## 2021-05-06 ENCOUNTER — TELEPHONE (OUTPATIENT)
Dept: FAMILY MEDICINE CLINIC | Age: 56
End: 2021-05-06

## 2021-05-06 NOTE — TELEPHONE ENCOUNTER
Patient no showed for appt with Ayo Card on 05/06/2021.  Spoke with patient and rescheduled she had forgotten about appt due to recent ankle injury and seeing Ortho and Physical Therapy

## 2021-05-21 ENCOUNTER — TELEPHONE (OUTPATIENT)
Dept: FAMILY MEDICINE CLINIC | Age: 56
End: 2021-05-21

## 2021-05-21 NOTE — TELEPHONE ENCOUNTER
Patient called and she needs a letter stating when her appt is with Wade Rodgers.  This was done and faxed to [facility] at [phone]

## 2021-05-21 NOTE — TELEPHONE ENCOUNTER
----- Message from Zackery Zapata sent at 5/21/2021 10:09 AM EDT -----  Subject: Message to Provider    QUESTIONS  Information for Provider? Patient asks that a physical appointment card   for her 5/27 appointment be mailed to her if possible.   ---------------------------------------------------------------------------  --------------  Purvis  What is the best way for the office to contact you? OK to leave message on   voicemail  Preferred Call Back Phone Number? [phone]  ---------------------------------------------------------------------------  --------------  SCRIPT ANSWERS  Relationship to Patient?  Self

## 2021-05-21 NOTE — LETTER
[address]  Phone: [phone]  Fax: [phone]    [place], [place]        May 21, 2021    [address] Jessa Richardson:    Your appointment with Cory Shaikh is on Thursday, May 27,2021 at  11:45AM arrival.    If you have any questions or concerns, please don't hesitate to call.     Sincerely,          Cory Shaikh, APRBART - CNP

## 2021-05-27 ENCOUNTER — TELEPHONE (OUTPATIENT)
Dept: FAMILY MEDICINE CLINIC | Age: 56
End: 2021-05-27

## 2021-05-27 NOTE — TELEPHONE ENCOUNTER
----- Message from José Miguel Nicolas sent at 5/27/2021  9:27 AM EDT -----  Subject: Referral Request    QUESTIONS   Reason for referral request? Pt wonders if she due for blood work with 6/4   follow up. If so- she wants to know if she should fast.   Has the physician seen you for this condition before? Yes  Select a date? 2020-12-11  Select the physician (PCP or Specialist)? Alta Olvera   Preferred Specialist (if applicable)? Do you already have an appointment scheduled? Yes  Select Scheduled Date? 2021-06-04  Select Scheduled Physician? Alta Olvera   Additional Information for Provider?   ---------------------------------------------------------------------------  --------------  [address]  What is the best way for the office to contact you?  OK to leave message on   voicemail  Preferred Call Back Phone Number? [phone]

## 2021-06-02 ENCOUNTER — CLINICAL DOCUMENTATION (OUTPATIENT)
Dept: OTHER | Age: 56
End: 2021-06-02

## 2021-06-04 ENCOUNTER — OFFICE VISIT (OUTPATIENT)
Dept: FAMILY MEDICINE CLINIC | Age: 56
End: 2021-06-04
Payer: MEDICARE

## 2021-06-04 VITALS
TEMPERATURE: 99.1 F | BODY MASS INDEX: 39.32 KG/M2 | DIASTOLIC BLOOD PRESSURE: 88 MMHG | WEIGHT: 215 LBS | OXYGEN SATURATION: 99 % | HEART RATE: 114 BPM | SYSTOLIC BLOOD PRESSURE: 136 MMHG

## 2021-06-04 DIAGNOSIS — F42.8 OTHER OBSESSIVE-COMPULSIVE DISORDERS: ICD-10-CM

## 2021-06-04 DIAGNOSIS — R73.9 HYPERGLYCEMIA: ICD-10-CM

## 2021-06-04 DIAGNOSIS — Z12.4 SCREENING FOR CERVICAL CANCER: ICD-10-CM

## 2021-06-04 DIAGNOSIS — J45.909 ASTHMA, UNSPECIFIED ASTHMA SEVERITY, UNSPECIFIED WHETHER COMPLICATED, UNSPECIFIED WHETHER PERSISTENT: ICD-10-CM

## 2021-06-04 DIAGNOSIS — E78.5 HYPERLIPIDEMIA, UNSPECIFIED HYPERLIPIDEMIA TYPE: Primary | ICD-10-CM

## 2021-06-04 DIAGNOSIS — K58.1 IRRITABLE BOWEL SYNDROME WITH CONSTIPATION: ICD-10-CM

## 2021-06-04 DIAGNOSIS — F20.9 SCHIZOPHRENIA, UNSPECIFIED TYPE (HCC): ICD-10-CM

## 2021-06-04 DIAGNOSIS — M79.7 FIBROMYALGIA: ICD-10-CM

## 2021-06-04 DIAGNOSIS — Z53.20 COLON CANCER SCREENING DECLINED: ICD-10-CM

## 2021-06-04 DIAGNOSIS — Z13.1 DIABETES MELLITUS SCREENING: ICD-10-CM

## 2021-06-04 DIAGNOSIS — F31.9 BIPOLAR AFFECTIVE DISORDER, REMISSION STATUS UNSPECIFIED (HCC): ICD-10-CM

## 2021-06-04 DIAGNOSIS — Z87.891 HISTORY OF TOBACCO USE: ICD-10-CM

## 2021-06-04 DIAGNOSIS — Z12.31 BREAST CANCER SCREENING BY MAMMOGRAM: ICD-10-CM

## 2021-06-04 DIAGNOSIS — G47.39 OTHER SLEEP APNEA: ICD-10-CM

## 2021-06-04 DIAGNOSIS — K21.9 GASTROESOPHAGEAL REFLUX DISEASE WITHOUT ESOPHAGITIS: ICD-10-CM

## 2021-06-04 PROCEDURE — 36415 COLL VENOUS BLD VENIPUNCTURE: CPT | Performed by: NURSE PRACTITIONER

## 2021-06-04 PROCEDURE — 99214 OFFICE O/P EST MOD 30 MIN: CPT | Performed by: NURSE PRACTITIONER

## 2021-06-04 PROCEDURE — G8417 CALC BMI ABV UP PARAM F/U: HCPCS | Performed by: NURSE PRACTITIONER

## 2021-06-04 PROCEDURE — 1036F TOBACCO NON-USER: CPT | Performed by: NURSE PRACTITIONER

## 2021-06-04 PROCEDURE — G8427 DOCREV CUR MEDS BY ELIG CLIN: HCPCS | Performed by: NURSE PRACTITIONER

## 2021-06-04 PROCEDURE — 3017F COLORECTAL CA SCREEN DOC REV: CPT | Performed by: NURSE PRACTITIONER

## 2021-06-04 ASSESSMENT — ENCOUNTER SYMPTOMS
SORE THROAT: 0
COUGH: 0
RHINORRHEA: 0
NAUSEA: 0
VOMITING: 0
SHORTNESS OF BREATH: 1
DIARRHEA: 0
WHEEZING: 0
BACK PAIN: 1
ABDOMINAL PAIN: 0

## 2021-06-04 NOTE — PROGRESS NOTES
CHIEF COMPLAINT  Chief Complaint   Patient presents with    Check-Up     OCD, bipolar disorder, hyperlipidemia         HPI   Franky Frankel is a 48 y.o. female who presents to the office checkup. Patient denies any new unusual fatigue or unexplained weight loss. No episodes of chest pain or chest tightness. Patient reports chronic shortness of breath. Patient has history of sleep apnea but does not wear CPAP or follow with pulmonology. Patient is a former smoker but reports quitting in 2006. No cough or congestion. No abdominal pain or discomfort. No nausea, vomiting, dark tarry stools. Patient has history of IBS but no new or worsening symptoms. Patient reports that she is recovering from surgery on her left ankle. Patient reports that she will be going back to work soon. Patient reports taking her medications as prescribed. Patient also follows up with [address] [place] [address],[address] on a monthly basis. No other complaints, modifying factors or associated symptoms. Nursing notes reviewed.    Past Medical History:   Diagnosis Date    Asthma     Bipolar disorder     Carpal tunnel syndrome     COPD (chronic obstructive pulmonary disease) (HCC)     Fibromyalgia     GERD (gastroesophageal reflux disease)     Hyperlipidemia     Irritable bowel syndrome     Manic depression (HCC)     PONV (postoperative nausea and vomiting)     PTSD (post-traumatic stress disorder)     Schizophrenia      Past Surgical History:   Procedure Laterality Date    ANKLE FRACTURE SURGERY Left 2/12/2021    LEFT ANKLE OPEN REDUCTION INTERNAL FIXATION   performed by Alexandru Doss DO at [address] [place] [address] Rd      DISCECTOMY L3    BREAST BIOPSY Right     LUMPECTOMY, BENIGN    CARPAL TUNNEL RELEASE Bilateral     MULTIPLE    HIP SURGERY Left     BONE SPUR    KNEE ARTHROCENTESIS Left 10/27/2017    left knee medial compartment arthroplasty    KNEE ARTHROPLASTY Right 12/09/2016    RIGHT PARTIAL KNEE ARTHROPLASTY    KNEE ARTHROPLASTY Right 2017    right Knee open medial compartment arthroplasty evaluation, irrigation and debridement     KNEE ARTHROSCOPY Left     OVARIAN CYST SURGERY      right     TONSILLECTOMY AND ADENOIDECTOMY      TUBAL LIGATION      R TUBELECTOMY      History reviewed. No pertinent family history. Social History     Socioeconomic History    Marital status:      Spouse name: Not on file    Number of children: Not on file    Years of education: Not on file    Highest education level: Not on file   Occupational History    Not on file   Tobacco Use    Smoking status: Former Smoker     Packs/day: 0.50     Types: Cigarettes     Quit date: 10/17/2007     Years since quittin.6    Smokeless tobacco: Never Used   Substance and Sexual Activity    Alcohol use: Yes     Comment: rare    Drug use: No    Sexual activity: Yes     Partners: Male   Other Topics Concern    Not on file   Social History Narrative    Not on file     Social Determinants of Health     Financial Resource Strain:     Difficulty of Paying Living Expenses:    Food Insecurity:     Worried About Running Out of Food in the Last Year:     [address] in the Last Year:    Transportation Needs:     Lack of Transportation (Medical):      Lack of Transportation (Non-Medical):    Physical Activity:     Days of Exercise per Week:     Minutes of Exercise per Session:    Stress:     Feeling of Stress :    Social Connections:     Frequency of Communication with Friends and Family:     Frequency of Social Gatherings with Friends and Family:     Attends Mandaeism Services:     Active Member of Clubs or Organizations:     Attends Club or Organization Meetings:     Marital Status:    Intimate Partner Violence:     Fear of Current or Ex-Partner:     Emotionally Abused:     Physically Abused:     Sexually Abused:      Current Outpatient Medications   Medication Sig Dispense Refill    Handicap Placard MISC by Does not apply route  2022 1 each 0    ondansetron (ZOFRAN) 4 MG tablet TAKE 1 TABLET EVERY 8 HOURS AS NEEDED FOR NAUSEA AND VOMITING 30 tablet 3    pramipexole (MIRAPEX) 0.25 MG tablet TAKE 1 TABLET BY MOUTH 3 TIMES DAILY 90 tablet 1    acetaminophen (AMINOFEN) 325 MG tablet Take 2 tablets by mouth every 6 hours as needed for Pain 90 tablet 0    SAVELLA 25 MG TABS TAKE 2 TABLETS BY MOUTH 2 TIMES DAILY 120 tablet 3    montelukast (SINGULAIR) 10 MG tablet Take 1 tablet by mouth nightly 30 tablet 1    LINZESS 145 MCG capsule TAKE 1 CAPSULE BY MOUTH EVERY MORNING (BEFORE BREAKFAST) 30 capsule 5    albuterol sulfate HFA (PROAIR HFA) 108 (90 Base) MCG/ACT inhaler USE 1-2 PUFFS EVERY 4    HOURS AS NEEDED FORWHEEZING 1 Inhaler 5    meloxicam (MOBIC) 15 MG tablet Take 1 tablet by mouth daily 10 tablet 0    prazosin (MINIPRESS) 1 MG capsule       Omega-3 Fatty Acids (FISH OIL PO) Take by mouth      buPROPion (WELLBUTRIN) 75 MG tablet Take 75 mg by mouth daily       hydrOXYzine (VISTARIL) 25 MG capsule Take 25 mg by mouth 2 times daily       lamoTRIgine (LAMICTAL) 100 MG tablet TAKE 1 TABLET BY MOUTH TWICE A DAY      sertraline (ZOLOFT) 50 MG tablet Take 100 mg by mouth daily       aspirin 81 MG EC tablet Take 81 mg by mouth daily      Multiple Vitamin (MULTI-VITAMIN PO) Take by mouth      VITAMIN D PO Take by mouth      Ascorbic Acid (VITAMIN C PO) Take by mouth      Coenzyme Q10 (COQ10 PO) Take by mouth      gabapentin (NEURONTIN) 400 MG capsule Take 800 mg by mouth 3 times daily.  OLANZapine (ZYPREXA) 20 MG tablet nightly       ibuprofen (ADVIL;MOTRIN) 600 MG tablet Take 1 tablet by mouth every 8 hours as needed for Pain 30 tablet 0     No current facility-administered medications for this visit.      Allergies   Allergen Reactions    Calamine Hives    Diphenhydramine     Diphenhydramine Hcl Hives    Dust Mite Extract Other (See Comments)    Macadamia Nut Oil Other (See Comments)    Pramoxine-Calamine Hives    Seasonal      Other reaction(s): Cough    Erythromycin Palpitations       REVIEW OF SYSTEMS  Review of Systems   Constitutional: Negative for activity change, appetite change, chills and fever. HENT: Negative for congestion, rhinorrhea and sore throat. Eyes: Negative for visual disturbance. Respiratory: Positive for shortness of breath. Negative for cough and wheezing. Cardiovascular: Negative for chest pain and leg swelling. Gastrointestinal: Negative for abdominal pain, diarrhea, nausea and vomiting. Genitourinary: Negative for dysuria, frequency, hematuria and urgency. Musculoskeletal: Positive for arthralgias and back pain. Negative for gait problem and myalgias. Skin: Negative for rash. Neurological: Negative for dizziness, weakness, light-headedness, numbness and headaches. Psychiatric/Behavioral: Negative for self-injury, sleep disturbance and suicidal ideas. The patient is not nervous/anxious. PHYSICAL EXAM  /88   Pulse 114   Temp 99.1 °F (37.3 °C) (Oral)   Wt 215 lb (97.5 kg)   LMP 10/03/2017   SpO2 99%   BMI 39.32 kg/m²    Physical Exam  Vitals reviewed. Constitutional:       General: She is not in acute distress. Appearance: Normal appearance. She is well-developed. She is not diaphoretic. HENT:      Head: Normocephalic and atraumatic. Right Ear: Tympanic membrane normal.      Left Ear: Tympanic membrane normal.      Nose: Nose normal.      Mouth/Throat:      Mouth: Mucous membranes are moist.      Pharynx: Oropharynx is clear. No oropharyngeal exudate or posterior oropharyngeal erythema. Eyes:      General:         Right eye: No discharge. Left eye: No discharge. Pupils: Pupils are equal, round, and reactive to light. Neck:      Vascular: No JVD. Cardiovascular:      Rate and Rhythm: Normal rate and regular rhythm. Pulses: Normal pulses.    Pulmonary:      Effort: Pulmonary effort is normal. No respiratory distress. Breath sounds: No stridor. No wheezing, rhonchi or rales. Chest:      Chest wall: No tenderness. Abdominal:      General: Bowel sounds are normal. There is no distension. Palpations: Abdomen is soft. Tenderness: There is no abdominal tenderness. There is no guarding. Musculoskeletal:         General: No swelling or deformity. Normal range of motion. Cervical back: Normal range of motion and neck supple. Left lower leg: Edema present. Skin:     General: Skin is warm and dry. Capillary Refill: Capillary refill takes less than 2 seconds. Findings: No rash. Neurological:      General: No focal deficit present. Mental Status: She is alert and oriented to person, place, and time. Psychiatric:         Attention and Perception: She is inattentive. Mood and Affect: Mood is anxious. Speech: Speech is rapid and pressured. Behavior: Behavior is cooperative. Thought Content: Thought content normal.       ASSESSMENT/PLAN:   1. Hyperlipidemia, unspecified hyperlipidemia type  Stable. Previous lipid panel elevated. Patient encouraged to monitor saturated fats, increase fiber and continue taking omega-3 fish oil daily. Patient also encouraged on exercise and weight loss. Continue current treatment management follow-up in 6 months, sooner for new or worsening symptoms.  - Lipid, Fasting    2. Gastroesophageal reflux disease without esophagitis  Stable. Patient reports doing well. No recent episodes of dysphagia, nausea, vomiting, indigestion or chest burning. Patient instructed on foods to avoid that may trigger symptoms. Continue with current treatment management follow-up in 6 months, sooner for new or worsening symptoms.  - COMPREHENSIVE METABOLIC PANEL  - CBC Auto Differential    3.  Bipolar affective disorder, remission status unspecified (HCC)/Other obsessive-compulsive disorders/Schizophrenia, unspecified type ([facility]  Stable. Managed by psychiatrist at [address] [facility], Dr. Hai Mejia. Patient reports seeing them on a monthly basis. No recent changes in medications or treatment. Continue with current treatment and regimen follow-up in 6 months, sooner for new or worsening symptoms. 5. History of tobacco use  Former tobacco user. Patient reports quitting in . 6. Other sleep apnea  Patient reports waking up at night gasping. Patient has history diagnosis of sleep apnea but reports that she does not remember having sleep studies performed in the past.  Patient does not use CPAP and reports that she has never had 1. I did recommend her having a sleep study for further evaluation and diagnosis. Patient verbalized and acknowledges. - Callie Apgar, MD, Sleep Medicine, [facility]    7. Asthma, unspecified asthma severity, unspecified whether complicated, unspecified whether persistent  Stable. No recent exacerbations. Patient reports using albuterol inhaler as needed. Patient also takes Singulair on a nightly basis. Continue with current treatment management follow-up for any new or worsening symptoms. 8. Fibromyalgia  Stable. Patient reports taking Tylenol and ibuprofen on a daily basis. Continue with current treatment management follow-up in 6 months, sooner for new or worsening symptoms.  - Handicap Placard MISC; by Does not apply route  2022  Dispense: 1 each; Refill: 0    9. Irritable bowel syndrome with constipation  Stable. Patient reports take medications as prescribed. No recent episodes of constipation, diarrhea, abdominal bloating or distention. No dark or tarry stools. Continue current treatment management follow-up in 6 months, sooner for new or worsening symptoms. 10. Screening for cervical cancer  Patient encouraged to call and schedule gynecological exam.  Patient verbalized and acknowledges.     11. Breast cancer screening by mammogram  Preventative screening recommended. Orders placed and patient acknowledges.  - JOHN DIGITAL SCREEN W OR WO CAD BILATERAL; Future    12. Diabetes mellitus screening/ Hyperglycemia  Preventative screening recommended. Previous glucose 106. Labs ordered and pending we will follow-up with results.  - HEMOGLOBIN A1C     13. Colon cancer screening declined  Preventative screening recommended. Patient declines. The note was completed using Dragon voice recognition transcription. Every effort was made to ensure accuracy; however, inadvertent  transcription errors may be present despite my best efforts to edit errors.     Yolanda Greer, APRN - CNP

## 2021-06-04 NOTE — PATIENT INSTRUCTIONS
Please read the healthy family handout that you were given and share it with your family. Please compare this printed medication list with your medications at home to be sure they are the same. If you have any medications that are different please contact us immediately at [phone]. Also review your allergies that we have listed, these may also include medications that you have not been able to tolerate, make sure everything listed is correct. If you have any allergies that are different please contact us immediately at [phone].

## 2021-06-05 LAB
A/G RATIO: 1.8 (ref 1.1–2.2)
ALBUMIN SERPL-MCNC: 4.6 G/DL (ref 3.4–5)
ALP BLD-CCNC: 114 U/L (ref 40–129)
ALT SERPL-CCNC: 23 U/L (ref 10–40)
ANION GAP SERPL CALCULATED.3IONS-SCNC: 15 MMOL/L (ref 3–16)
AST SERPL-CCNC: 18 U/L (ref 15–37)
BASOPHILS ABSOLUTE: 0 K/UL (ref 0–0.2)
BASOPHILS RELATIVE PERCENT: 0.5 %
BILIRUB SERPL-MCNC: <0.2 MG/DL (ref 0–1)
BUN BLDV-MCNC: 8 MG/DL (ref 7–20)
CALCIUM SERPL-MCNC: 10 MG/DL (ref 8.3–10.6)
CHLORIDE BLD-SCNC: 102 MMOL/L (ref 99–110)
CHOLESTEROL, FASTING: 268 MG/DL (ref 0–199)
CO2: 25 MMOL/L (ref 21–32)
CREAT SERPL-MCNC: 0.8 MG/DL (ref 0.6–1.1)
EOSINOPHILS ABSOLUTE: 0.2 K/UL (ref 0–0.6)
EOSINOPHILS RELATIVE PERCENT: 2 %
ESTIMATED AVERAGE GLUCOSE: 134.1 MG/DL
GFR AFRICAN AMERICAN: >60
GFR NON-AFRICAN AMERICAN: >60
GLOBULIN: 2.5 G/DL
GLUCOSE BLD-MCNC: 115 MG/DL (ref 70–99)
HBA1C MFR BLD: 6.3 %
HCT VFR BLD CALC: 42.3 % (ref 36–48)
HDLC SERPL-MCNC: 46 MG/DL (ref 40–60)
HEMOGLOBIN: 14.2 G/DL (ref 12–16)
LDL CHOLESTEROL CALCULATED: ABNORMAL MG/DL
LDL CHOLESTEROL DIRECT: 123 MG/DL
LYMPHOCYTES ABSOLUTE: 2.5 K/UL (ref 1–5.1)
LYMPHOCYTES RELATIVE PERCENT: 28.9 %
MCH RBC QN AUTO: 31.7 PG (ref 26–34)
MCHC RBC AUTO-ENTMCNC: 33.6 G/DL (ref 31–36)
MCV RBC AUTO: 94.5 FL (ref 80–100)
MONOCYTES ABSOLUTE: 0.6 K/UL (ref 0–1.3)
MONOCYTES RELATIVE PERCENT: 6.9 %
NEUTROPHILS ABSOLUTE: 5.4 K/UL (ref 1.7–7.7)
NEUTROPHILS RELATIVE PERCENT: 61.7 %
PDW BLD-RTO: 13 % (ref 12.4–15.4)
PLATELET # BLD: 318 K/UL (ref 135–450)
PMV BLD AUTO: 8.3 FL (ref 5–10.5)
POTASSIUM SERPL-SCNC: 4.3 MMOL/L (ref 3.5–5.1)
RBC # BLD: 4.48 M/UL (ref 4–5.2)
SODIUM BLD-SCNC: 142 MMOL/L (ref 136–145)
TOTAL PROTEIN: 7.1 G/DL (ref 6.4–8.2)
TRIGLYCERIDE, FASTING: 523 MG/DL (ref 0–150)
VLDLC SERPL CALC-MCNC: ABNORMAL MG/DL
WBC # BLD: 8.8 K/UL (ref 4–11)

## 2021-06-22 DIAGNOSIS — K58.1 IRRITABLE BOWEL SYNDROME WITH CONSTIPATION: ICD-10-CM

## 2021-06-22 RX ORDER — MILNACIPRAN HYDROCHLORIDE 25 MG/1
50 TABLET, FILM COATED ORAL 2 TIMES DAILY
Qty: 120 TABLET | Refills: 3 | Status: SHIPPED | OUTPATIENT
Start: 2021-06-22 | End: 2021-10-20

## 2021-06-22 NOTE — TELEPHONE ENCOUNTER
Refilled medication per verbal order from provider.   Future appt is due 12/2021  Last appt 06/04/2021

## 2021-06-28 RX ORDER — PRAMIPEXOLE DIHYDROCHLORIDE 0.25 MG/1
0.25 TABLET ORAL 3 TIMES DAILY
Qty: 90 TABLET | Refills: 1 | Status: SHIPPED | OUTPATIENT
Start: 2021-06-28 | End: 2021-10-20

## 2021-07-01 ENCOUNTER — APPOINTMENT (OUTPATIENT)
Dept: GENERAL RADIOLOGY | Age: 56
End: 2021-07-01
Payer: MEDICARE

## 2021-07-01 ENCOUNTER — HOSPITAL ENCOUNTER (EMERGENCY)
Age: 56
Discharge: HOME | End: 2021-07-01
Attending: EMERGENCY MEDICINE
Payer: MEDICARE

## 2021-07-01 VITALS
HEIGHT: 62 IN | RESPIRATION RATE: 20 BRPM | TEMPERATURE: 97.9 F | OXYGEN SATURATION: 100 % | DIASTOLIC BLOOD PRESSURE: 95 MMHG | BODY MASS INDEX: 35.33 KG/M2 | SYSTOLIC BLOOD PRESSURE: 136 MMHG | HEART RATE: 104 BPM | WEIGHT: 192 LBS

## 2021-07-01 DIAGNOSIS — S52.501A CLOSED FRACTURE OF DISTAL END OF RIGHT RADIUS, UNSPECIFIED FRACTURE MORPHOLOGY, INITIAL ENCOUNTER: Primary | ICD-10-CM

## 2021-07-01 PROCEDURE — 29125 APPL SHORT ARM SPLINT STATIC: CPT

## 2021-07-01 PROCEDURE — 73110 X-RAY EXAM OF WRIST: CPT

## 2021-07-01 PROCEDURE — 96372 THER/PROPH/DIAG INJ SC/IM: CPT

## 2021-07-01 PROCEDURE — 6360000002 HC RX W HCPCS: Performed by: EMERGENCY MEDICINE

## 2021-07-01 PROCEDURE — 99284 EMERGENCY DEPT VISIT MOD MDM: CPT

## 2021-07-01 PROCEDURE — 6370000000 HC RX 637 (ALT 250 FOR IP): Performed by: EMERGENCY MEDICINE

## 2021-07-01 PROCEDURE — 73090 X-RAY EXAM OF FOREARM: CPT

## 2021-07-01 PROCEDURE — 73130 X-RAY EXAM OF HAND: CPT

## 2021-07-01 RX ORDER — KETOROLAC TROMETHAMINE 30 MG/ML
30 INJECTION, SOLUTION INTRAMUSCULAR; INTRAVENOUS ONCE
Status: COMPLETED | OUTPATIENT
Start: 2021-07-01 | End: 2021-07-01

## 2021-07-01 RX ORDER — OXYCODONE HYDROCHLORIDE AND ACETAMINOPHEN 5; 325 MG/1; MG/1
1 TABLET ORAL EVERY 6 HOURS PRN
Qty: 12 TABLET | Refills: 0 | Status: SHIPPED | OUTPATIENT
Start: 2021-07-01 | End: 2021-07-04

## 2021-07-01 RX ORDER — OXYCODONE HYDROCHLORIDE AND ACETAMINOPHEN 5; 325 MG/1; MG/1
1 TABLET ORAL ONCE
Status: COMPLETED | OUTPATIENT
Start: 2021-07-01 | End: 2021-07-01

## 2021-07-01 RX ADMIN — OXYCODONE HYDROCHLORIDE AND ACETAMINOPHEN 1 TABLET: 5; 325 TABLET ORAL at 20:16

## 2021-07-01 RX ADMIN — KETOROLAC TROMETHAMINE 30 MG: 30 INJECTION, SOLUTION INTRAMUSCULAR; INTRAVENOUS at 20:16

## 2021-07-01 ASSESSMENT — PAIN SCALES - GENERAL
PAINLEVEL_OUTOF10: 10
PAINLEVEL_OUTOF10: 10
PAINLEVEL_OUTOF10: 6

## 2021-07-01 ASSESSMENT — PAIN DESCRIPTION - PROGRESSION: CLINICAL_PROGRESSION: NOT CHANGED

## 2021-07-01 ASSESSMENT — PAIN DESCRIPTION - LOCATION: LOCATION: WRIST;ARM

## 2021-07-01 ASSESSMENT — PAIN DESCRIPTION - FREQUENCY: FREQUENCY: CONTINUOUS

## 2021-07-01 ASSESSMENT — PAIN DESCRIPTION - DESCRIPTORS: DESCRIPTORS: THROBBING

## 2021-07-01 ASSESSMENT — PAIN DESCRIPTION - ONSET: ONSET: SUDDEN

## 2021-07-01 ASSESSMENT — PAIN DESCRIPTION - PAIN TYPE: TYPE: ACUTE PAIN

## 2021-07-01 ASSESSMENT — PAIN DESCRIPTION - ORIENTATION: ORIENTATION: RIGHT

## 2021-07-02 NOTE — ED PROVIDER NOTES
Emergency Department Attending Note    Nisreen Najera MD    Date of ED VIsit: 7/1/2021    CHIEF COMPLAINT  Fall (right arm/wrist pain after fall 1 hour ago)      HISTORY OF PRESENT ILLNESS  Uziel Mejia is a 48 y.o. female  With Vital signs of BP (!) 136/95   Pulse 104   Temp 97.9 °F (36.6 °C) (Oral)   Resp 20   Ht 5' 2\" (1.575 m)   Wt 192 lb (87.1 kg)   LMP 10/03/2017   SpO2 100%   BMI 35.12 kg/m²  who presents to the ED with a complaint of right wrist and forearm pain. Patient seen and evaluated in room 9. Patient is hysterical complaining of pain in the right wrist and forearm from a fall while she was going up the stairs. This is a [address] type injury that she incurred and prompted her to come into the emergency department for evaluation. She did not hit her head no neck pain no other complaints from the fall other than the right wrist hand and forearm. No other complaints, modifying factors or associated symptoms.     Patients Past medical history reviewed and listed below  Past Medical History:   Diagnosis Date    Asthma     Bipolar disorder     Carpal tunnel syndrome     COPD (chronic obstructive pulmonary disease) (HCC)     Fibromyalgia     GERD (gastroesophageal reflux disease)     Hyperlipidemia     Irritable bowel syndrome     Manic depression (HCC)     PONV (postoperative nausea and vomiting)     PTSD (post-traumatic stress disorder)     Schizophrenia      Past Surgical History:   Procedure Laterality Date    ANKLE FRACTURE SURGERY Left 2/12/2021    LEFT ANKLE OPEN REDUCTION INTERNAL FIXATION   performed by Eduin Mason DO at [address] [place] [address] Rd      DISCECTOMY L3    BREAST BIOPSY Right     LUMPECTOMY, BENIGN    CARPAL TUNNEL RELEASE Bilateral     MULTIPLE    HIP SURGERY Left     BONE SPUR    KNEE ARTHROCENTESIS Left 10/27/2017    left knee medial compartment arthroplasty    KNEE ARTHROPLASTY Right 12/09/2016    RIGHT PARTIAL KNEE ARTHROPLASTY    KNEE ARTHROPLASTY Right 2017    right Knee open medial compartment arthroplasty evaluation, irrigation and debridement     KNEE ARTHROSCOPY Left     OVARIAN CYST SURGERY      right     TONSILLECTOMY AND ADENOIDECTOMY      TUBAL LIGATION      R TUBELECTOMY        I have reviewed the following from the nursing documentation. History reviewed. No pertinent family history. Social History     Socioeconomic History    Marital status:      Spouse name: Not on file    Number of children: Not on file    Years of education: Not on file    Highest education level: Not on file   Occupational History    Not on file   Tobacco Use    Smoking status: Former Smoker     Packs/day: 0.50     Types: Cigarettes     Quit date: 10/17/2007     Years since quittin.7    Smokeless tobacco: Never Used   Substance and Sexual Activity    Alcohol use: Yes     Comment: rare    Drug use: No    Sexual activity: Yes     Partners: Male   Other Topics Concern    Not on file   Social History Narrative    Not on file     Social Determinants of Health     Financial Resource Strain:     Difficulty of Paying Living Expenses:    Food Insecurity:     Worried About Running Out of Food in the Last Year:     [address] in the Last Year:    Transportation Needs:     Lack of Transportation (Medical):      Lack of Transportation (Non-Medical):    Physical Activity:     Days of Exercise per Week:     Minutes of Exercise per Session:    Stress:     Feeling of Stress :    Social Connections:     Frequency of Communication with Friends and Family:     Frequency of Social Gatherings with Friends and Family:     Attends Cheondoism Services:     Active Member of Clubs or Organizations:     Attends Club or Organization Meetings:     Marital Status:    Intimate Partner Violence:     Fear of Current or Ex-Partner:     Emotionally Abused:     Physically Abused:     Sexually Abused:      No current facility-administered medications for this encounter. Current Outpatient Medications   Medication Sig Dispense Refill    pramipexole (MIRAPEX) 0.25 MG tablet TAKE 1 TABLET BY MOUTH 3 TIMES DAILY 90 tablet 1    SAVELLA 25 MG TABS TAKE 2 TABLETS BY MOUTH 2 TIMES DAILY 120 tablet 3    Handicap Placard MISC by Does not apply route  2022 1 each 0    ondansetron (ZOFRAN) 4 MG tablet TAKE 1 TABLET EVERY 8 HOURS AS NEEDED FOR NAUSEA AND VOMITING 30 tablet 3    acetaminophen (AMINOFEN) 325 MG tablet Take 2 tablets by mouth every 6 hours as needed for Pain 90 tablet 0    ibuprofen (ADVIL;MOTRIN) 600 MG tablet Take 1 tablet by mouth every 8 hours as needed for Pain 30 tablet 0    montelukast (SINGULAIR) 10 MG tablet Take 1 tablet by mouth nightly 30 tablet 1    LINZESS 145 MCG capsule TAKE 1 CAPSULE BY MOUTH EVERY MORNING (BEFORE BREAKFAST) 30 capsule 5    albuterol sulfate HFA (PROAIR HFA) 108 (90 Base) MCG/ACT inhaler USE 1-2 PUFFS EVERY 4    HOURS AS NEEDED FORWHEEZING 1 Inhaler 5    meloxicam (MOBIC) 15 MG tablet Take 1 tablet by mouth daily 10 tablet 0    prazosin (MINIPRESS) 1 MG capsule       Omega-3 Fatty Acids (FISH OIL PO) Take by mouth      buPROPion (WELLBUTRIN) 75 MG tablet Take 75 mg by mouth daily       hydrOXYzine (VISTARIL) 25 MG capsule Take 25 mg by mouth 2 times daily       lamoTRIgine (LAMICTAL) 100 MG tablet TAKE 1 TABLET BY MOUTH TWICE A DAY      sertraline (ZOLOFT) 50 MG tablet Take 100 mg by mouth daily       aspirin 81 MG EC tablet Take 81 mg by mouth daily      Multiple Vitamin (MULTI-VITAMIN PO) Take by mouth      VITAMIN D PO Take by mouth      Ascorbic Acid (VITAMIN C PO) Take by mouth      Coenzyme Q10 (COQ10 PO) Take by mouth      gabapentin (NEURONTIN) 400 MG capsule Take 800 mg by mouth 3 times daily.        OLANZapine (ZYPREXA) 20 MG tablet nightly        Allergies   Allergen Reactions    Calamine Hives    Diphenhydramine     Diphenhydramine Hcl Hives    Dust Mite Extract Other (See Comments)    Macadamia Nut Oil Other (See Comments)    Pramoxine-Calamine Hives    Seasonal      Other reaction(s): Cough    Erythromycin Palpitations       REVIEW OF SYSTEMS  10 systems reviewed, pertinent positives per HPI otherwise noted to be negative     PHYSICAL EXAM  BP (!) 136/95   Pulse 104   Temp 97.9 °F (36.6 °C) (Oral)   Resp 20   Ht 5' 2\" (1.575 m)   Wt 192 lb (87.1 kg)   LMP 10/03/2017   SpO2 100%   BMI 35.12 kg/m²   GENERAL APPEARANCE: Awake and alert. Cooperative. In mild to moderate distress. HEAD: Normocephalic. Atraumatic. EYES: PERRL. EOM's grossly intact. ENT: Mucous membranes are pink and moist.   NECK: Supple. HEART: RRR. No murmurs. LUNGS: Respirations unlabored. CTAB. Good air exchange. ABDOMEN: Soft. Non-distended. Non-tender. No masses. No organomegaly. No guarding or rebound. EXTREMITIES: No peripheral edema. Moves all extremities equally. All extremities neurovascularly intact. Patient has pain all over the distal forearm the wrist and the proximal hand no significant edema is noted no obvious angulation. SKIN: Warm and dry. No acute rashes. NEUROLOGICAL: Alert and oriented. Distally neurovascularly intact. Strength 5/5, sensation intact. Gait normal.   PSYCHIATRIC: Normal mood and affect. No HI or SI expressed to me. RADIOLOGY    If acquired see below     EKG:     If acquired see below       ED COURSE/MDM    Patient was found to have an impacted distal radius fracture.   So she will be placed in a sugar tong splint and advised to follow-up with orthopedic surgery for repair    ED Course as of Jul 01 2109   Thu Jul 01, 2021 2108 IMPRESSION:  Nondisplaced distal radius fracture   XR WRIST RIGHT (MIN 3 VIEWS) [DL]      ED Course User Index  [DL] Jaqueline Roman MD       The ED course and plan were reviewed and results discussed with the patient    The patient understood and agreed with the Discharge/transfer planning.     CLINICAL IMPRESSION and DISPOSITION    Alissa Aguilar was stable and diagnosed with radius fracture    Patient was treated with Percocet and Toradol as well as a sugar tong splint       Perico Alonzo MD  07/01/21 2113

## 2021-07-14 DIAGNOSIS — K58.1 IRRITABLE BOWEL SYNDROME WITH CONSTIPATION: ICD-10-CM

## 2021-07-14 RX ORDER — LINACLOTIDE 145 UG/1
CAPSULE, GELATIN COATED ORAL
Qty: 30 CAPSULE | Refills: 5 | Status: SHIPPED | OUTPATIENT
Start: 2021-07-14 | End: 2022-01-18

## 2021-09-13 DIAGNOSIS — Z20.822 SUSPECTED COVID-19 VIRUS INFECTION: Primary | ICD-10-CM

## 2021-09-13 RX ORDER — ONDANSETRON 4 MG/1
TABLET, FILM COATED ORAL
Qty: 30 TABLET | Refills: 3 | Status: SHIPPED | OUTPATIENT
Start: 2021-09-13 | End: 2022-01-14 | Stop reason: SDUPTHER

## 2021-09-30 ENCOUNTER — HOSPITAL ENCOUNTER (OUTPATIENT)
Dept: MAMMOGRAPHY | Age: 56
Discharge: HOME | End: 2021-10-05
Payer: MEDICARE

## 2021-09-30 VITALS — HEIGHT: 62 IN | BODY MASS INDEX: 36.8 KG/M2 | WEIGHT: 200 LBS

## 2021-09-30 DIAGNOSIS — Z12.31 VISIT FOR SCREENING MAMMOGRAM: ICD-10-CM

## 2021-09-30 PROCEDURE — 77067 SCR MAMMO BI INCL CAD: CPT

## 2021-10-20 DIAGNOSIS — K58.1 IRRITABLE BOWEL SYNDROME WITH CONSTIPATION: ICD-10-CM

## 2021-10-20 RX ORDER — MILNACIPRAN HYDROCHLORIDE 25 MG/1
TABLET, FILM COATED ORAL
Qty: 120 TABLET | Refills: 5 | Status: SHIPPED | OUTPATIENT
Start: 2021-10-20 | End: 2022-06-30

## 2021-10-20 RX ORDER — PRAMIPEXOLE DIHYDROCHLORIDE 0.25 MG/1
0.25 TABLET ORAL 3 TIMES DAILY
Qty: 90 TABLET | Refills: 1 | Status: SHIPPED | OUTPATIENT
Start: 2021-10-20 | End: 2022-01-26

## 2022-01-13 NOTE — PROGRESS NOTES
CHIEF COMPLAINT  Chief Complaint   Patient presents with    Pharyngitis    Cough    Shortness of Breath    Sweats        HPI   Karla Azevedo is a 55 y.o. female who presents to the office complaining of nonproductive cough, shortness of breath, fever, chills, sore throat, nausea, vomiting, body aches and decreased taste. Patient reports that her symptoms have been ongoing for the past week or so. Patient reports COVID test at [facility] per herself was negative yesterday. No other complaints, modifying factors or associated symptoms. Nursing notes reviewed.    Past Medical History:   Diagnosis Date    Asthma     Bipolar disorder     Carpal tunnel syndrome     COPD (chronic obstructive pulmonary disease) (HCC)     Fibromyalgia     GERD (gastroesophageal reflux disease)     Hyperlipidemia     Irritable bowel syndrome     Manic depression (HCC)     PONV (postoperative nausea and vomiting)     PTSD (post-traumatic stress disorder)     Schizophrenia      Past Surgical History:   Procedure Laterality Date    ANKLE FRACTURE SURGERY Left 2/12/2021    LEFT ANKLE OPEN REDUCTION INTERNAL FIXATION   performed by Malika Samuel DO at [address] [place] [address] Rd      DISCECTOMY L3    BREAST BIOPSY Right     LUMPECTOMY, BENIGN    CARPAL TUNNEL RELEASE Bilateral     MULTIPLE    HIP SURGERY Left     BONE SPUR    KNEE ARTHROCENTESIS Left 10/27/2017    left knee medial compartment arthroplasty    KNEE ARTHROPLASTY Right 12/09/2016    RIGHT PARTIAL KNEE ARTHROPLASTY    KNEE ARTHROPLASTY Right 02/17/2017    right Knee open medial compartment arthroplasty evaluation, irrigation and debridement     KNEE ARTHROSCOPY Left     OVARIAN CYST SURGERY      right     TONSILLECTOMY AND ADENOIDECTOMY      TUBAL LIGATION      R TUBELECTOMY      Family History   Problem Relation Age of Onset    Breast Cancer Maternal Grandmother         over 48     Social History     Socioeconomic History  Marital status:      Spouse name: Not on file    Number of children: Not on file    Years of education: Not on file    Highest education level: Not on file   Occupational History    Not on file   Tobacco Use    Smoking status: Former Smoker     Packs/day: 0.50     Types: Cigarettes     Quit date: 10/17/2007     Years since quittin.2    Smokeless tobacco: Never Used   Substance and Sexual Activity    Alcohol use: Yes     Comment: rare    Drug use: No    Sexual activity: Yes     Partners: Male   Other Topics Concern    Not on file   Social History Narrative    Not on file     Social Determinants of Health     Financial Resource Strain:     Difficulty of Paying Living Expenses: Not on file   Food Insecurity:     Worried About Running Out of Food in the Last Year: Not on file    Denilson of Food in the Last Year: Not on file   Transportation Needs:     Lack of Transportation (Medical): Not on file    Lack of Transportation (Non-Medical):  Not on file   Physical Activity:     Days of Exercise per Week: Not on file    Minutes of Exercise per Session: Not on file   Stress:     Feeling of Stress : Not on file   Social Connections:     Frequency of Communication with Friends and Family: Not on file    Frequency of Social Gatherings with Friends and Family: Not on file    Attends Christianity Services: Not on file    Active Member of [address] [place] [address] or Organizations: Not on file    Attends Club or Organization Meetings: Not on file    Marital Status: Not on file   Intimate Partner Violence:     Fear of Current or Ex-Partner: Not on file    Emotionally Abused: Not on file    Physically Abused: Not on file    Sexually Abused: Not on file   Housing Stability:     Unable to Pay for Housing in the Last Year: Not on file    Number of [place] in the Last Year: Not on file    Unstable Housing in the Last Year: Not on file     Current Outpatient Medications   Medication Sig Dispense Refill    ondansetron (ZOFRAN) 4 MG tablet 1 tablet every 8 hours as needed for nausea and vomiting 30 tablet 3    SAVELLA 25 MG TABS TAKE 2 TABLETS TWICE DAILY 120 tablet 5    pramipexole (MIRAPEX) 0.25 MG tablet TAKE 1 TABLET BY MOUTH 3 TIMES DAILY 90 tablet 1    LINZESS 145 MCG capsule TAKE 1 CAPSULE BY MOUTH EVERY MORNING (BEFORE BREAKFAST) 30 capsule 5    Handicap Placard MISC by Does not apply route  2022 1 each 0    acetaminophen (AMINOFEN) 325 MG tablet Take 2 tablets by mouth every 6 hours as needed for Pain 90 tablet 0    montelukast (SINGULAIR) 10 MG tablet Take 1 tablet by mouth nightly 30 tablet 1    albuterol sulfate HFA (PROAIR HFA) 108 (90 Base) MCG/ACT inhaler USE 1-2 PUFFS EVERY 4    HOURS AS NEEDED FORWHEEZING 1 Inhaler 5    meloxicam (MOBIC) 15 MG tablet Take 1 tablet by mouth daily 10 tablet 0    prazosin (MINIPRESS) 1 MG capsule       Omega-3 Fatty Acids (FISH OIL PO) Take by mouth      buPROPion (WELLBUTRIN) 75 MG tablet Take 75 mg by mouth daily       hydrOXYzine (VISTARIL) 25 MG capsule Take 25 mg by mouth 2 times daily       lamoTRIgine (LAMICTAL) 100 MG tablet TAKE 1 TABLET BY MOUTH TWICE A DAY      sertraline (ZOLOFT) 50 MG tablet Take 100 mg by mouth daily       aspirin 81 MG EC tablet Take 81 mg by mouth daily      Multiple Vitamin (MULTI-VITAMIN PO) Take by mouth      VITAMIN D PO Take by mouth      Ascorbic Acid (VITAMIN C PO) Take by mouth      Coenzyme Q10 (COQ10 PO) Take by mouth      gabapentin (NEURONTIN) 400 MG capsule Take 800 mg by mouth 3 times daily.  OLANZapine (ZYPREXA) 20 MG tablet nightly       ibuprofen (ADVIL;MOTRIN) 600 MG tablet Take 1 tablet by mouth every 8 hours as needed for Pain 30 tablet 0     No current facility-administered medications for this visit.      Allergies   Allergen Reactions    Calamine Hives    Diphenhydramine     Diphenhydramine Hcl Hives    Dust Mite Extract Other (See Comments)    Macadamia Nut Oil Other (See Comments)    Pramoxine-Calamine Hives    Seasonal      Other reaction(s): Cough    Erythromycin Palpitations       REVIEW OF SYSTEMS  Review of Systems   Constitutional: Positive for chills, fatigue and fever. HENT: Positive for congestion and sore throat. Negative for ear pain. Eyes: Negative. Respiratory: Positive for cough and shortness of breath. Negative for chest tightness. Cardiovascular: Negative. Gastrointestinal: Positive for nausea and vomiting. Genitourinary: Negative. Musculoskeletal: Positive for myalgias. Skin: Negative. Neurological: Negative. Psychiatric/Behavioral: Negative. PHYSICAL EXAM  BP (!) 152/108   Pulse 93   Temp 98.6 °F (37 °C) (Oral)   Wt 202 lb 2 oz (91.7 kg)   LMP 10/03/2017 Comment: partial hys 1 ovary   SpO2 99%   BMI 36.97 kg/m²   Physical Exam  Constitutional:       Appearance: Normal appearance. She is not toxic-appearing. HENT:      Head: Normocephalic. Right Ear: Tympanic membrane normal.      Left Ear: Tympanic membrane normal.      Nose: Nose normal.      Mouth/Throat:      Mouth: Mucous membranes are moist.      Pharynx: Oropharynx is clear. Eyes:      Extraocular Movements: Extraocular movements intact. Conjunctiva/sclera: Conjunctivae normal.      Pupils: Pupils are equal, round, and reactive to light. Cardiovascular:      Rate and Rhythm: Normal rate. Pulses: Normal pulses. Pulmonary:      Effort: Pulmonary effort is normal.      Breath sounds: Normal breath sounds. Abdominal:      Palpations: Abdomen is soft. Tenderness: There is no guarding. Musculoskeletal:         General: Normal range of motion. Cervical back: Normal range of motion. Skin:     General: Skin is warm and dry. Capillary Refill: Capillary refill takes less than 2 seconds. Neurological:      Mental Status: She is alert and oriented to person, place, and time. ASSESSMENT/PLAN:   1.  Suspected COVID-19 virus best efforts to edit errors.     Shannan Crandall, JASON - CNP

## 2022-01-14 ENCOUNTER — HOSPITAL ENCOUNTER (OUTPATIENT)
Age: 57
Discharge: HOME | End: 2022-01-14
Payer: MEDICARE

## 2022-01-14 ENCOUNTER — OFFICE VISIT (OUTPATIENT)
Dept: FAMILY MEDICINE CLINIC | Age: 57
End: 2022-01-14
Payer: MEDICARE

## 2022-01-14 VITALS
OXYGEN SATURATION: 99 % | TEMPERATURE: 98.6 F | HEART RATE: 93 BPM | DIASTOLIC BLOOD PRESSURE: 108 MMHG | BODY MASS INDEX: 36.97 KG/M2 | WEIGHT: 202.13 LBS | SYSTOLIC BLOOD PRESSURE: 152 MMHG

## 2022-01-14 DIAGNOSIS — R11.2 NAUSEA AND VOMITING, INTRACTABILITY OF VOMITING NOT SPECIFIED, UNSPECIFIED VOMITING TYPE: ICD-10-CM

## 2022-01-14 DIAGNOSIS — J02.9 SORETHROAT: ICD-10-CM

## 2022-01-14 DIAGNOSIS — R06.02 SHORT OF BREATH ON EXERTION: ICD-10-CM

## 2022-01-14 DIAGNOSIS — R50.9 FEVER AND CHILLS: ICD-10-CM

## 2022-01-14 DIAGNOSIS — Z20.822 SUSPECTED COVID-19 VIRUS INFECTION: Primary | ICD-10-CM

## 2022-01-14 DIAGNOSIS — R05.9 COUGH: ICD-10-CM

## 2022-01-14 LAB
INFLUENZA A ANTIGEN, POC: NEGATIVE
INFLUENZA B ANTIGEN, POC: NEGATIVE
S PYO AG THROAT QL: NORMAL

## 2022-01-14 PROCEDURE — U0005 INFEC AGEN DETEC AMPLI PROBE: HCPCS

## 2022-01-14 PROCEDURE — 87804 INFLUENZA ASSAY W/OPTIC: CPT | Performed by: NURSE PRACTITIONER

## 2022-01-14 PROCEDURE — G8484 FLU IMMUNIZE NO ADMIN: HCPCS | Performed by: NURSE PRACTITIONER

## 2022-01-14 PROCEDURE — U0003 INFECTIOUS AGENT DETECTION BY NUCLEIC ACID (DNA OR RNA); SEVERE ACUTE RESPIRATORY SYNDROME CORONAVIRUS 2 (SARS-COV-2) (CORONAVIRUS DISEASE [COVID-19]), AMPLIFIED PROBE TECHNIQUE, MAKING USE OF HIGH THROUGHPUT TECHNOLOGIES AS DESCRIBED BY CMS-2020-01-R: HCPCS

## 2022-01-14 PROCEDURE — 3017F COLORECTAL CA SCREEN DOC REV: CPT | Performed by: NURSE PRACTITIONER

## 2022-01-14 PROCEDURE — 1036F TOBACCO NON-USER: CPT | Performed by: NURSE PRACTITIONER

## 2022-01-14 PROCEDURE — G8417 CALC BMI ABV UP PARAM F/U: HCPCS | Performed by: NURSE PRACTITIONER

## 2022-01-14 PROCEDURE — G8427 DOCREV CUR MEDS BY ELIG CLIN: HCPCS | Performed by: NURSE PRACTITIONER

## 2022-01-14 PROCEDURE — 87880 STREP A ASSAY W/OPTIC: CPT | Performed by: NURSE PRACTITIONER

## 2022-01-14 PROCEDURE — 99213 OFFICE O/P EST LOW 20 MIN: CPT | Performed by: NURSE PRACTITIONER

## 2022-01-14 RX ORDER — ONDANSETRON 4 MG/1
TABLET, FILM COATED ORAL
Qty: 30 TABLET | Refills: 3 | Status: SHIPPED | OUTPATIENT
Start: 2022-01-14 | End: 2022-07-12

## 2022-01-14 ASSESSMENT — ENCOUNTER SYMPTOMS
SORE THROAT: 1
SHORTNESS OF BREATH: 1
CHEST TIGHTNESS: 0
VOMITING: 1
EYES NEGATIVE: 1
NAUSEA: 1
COUGH: 1

## 2022-01-14 NOTE — PATIENT INSTRUCTIONS
Please read the healthy family handout that you were given and share it with your family. Please compare this printed medication list with your medications at home to be sure they are the same. If you have any medications that are different please contact us immediately at [phone]. Also review your allergies that we have listed, these may also include medications that you have not been able to tolerate, make sure everything listed is correct. If you have any allergies that are different please contact us immediately at [phone].

## 2022-01-14 NOTE — LETTER
[address]  [address]  Phone: [phone]  Fax: [phone]    JASON Ovalle CNP        January 14, 2022     Patient: Tuyet Arriola   YOB: 1965   Date of Visit: 1/14/2022       To Whom it May Concern:    Jacqueline Never was seen in my clinic on 1/14/2022. She is to be off work 1/14 and 1/15 and She may return to work on pending test results. .    If you have any questions or concerns, please don't hesitate to call.     Sincerely,         JASON Ovalle CNP

## 2022-01-15 LAB — SARS-COV-2: NOT DETECTED

## 2022-01-17 DIAGNOSIS — R09.89 CHEST CONGESTION: Primary | ICD-10-CM

## 2022-01-17 DIAGNOSIS — K58.1 IRRITABLE BOWEL SYNDROME WITH CONSTIPATION: ICD-10-CM

## 2022-01-18 RX ORDER — LINACLOTIDE 145 UG/1
CAPSULE, GELATIN COATED ORAL
Qty: 30 CAPSULE | Refills: 5 | Status: SHIPPED | OUTPATIENT
Start: 2022-01-18 | End: 2022-08-02

## 2022-01-18 NOTE — TELEPHONE ENCOUNTER
Future appt scheduled 04/20/2022                   Last appt 01/03/2022      Last Written   07/14/2021    LINZESS 145 MCG capsule  #30  5 RF

## 2022-01-26 RX ORDER — PRAMIPEXOLE DIHYDROCHLORIDE 0.25 MG/1
0.25 TABLET ORAL 3 TIMES DAILY
Qty: 90 TABLET | Refills: 1 | Status: SHIPPED | OUTPATIENT
Start: 2022-01-26 | End: 2022-04-21

## 2022-01-26 NOTE — TELEPHONE ENCOUNTER
Future appt scheduled 0 appt scheduled- Return in about 6 months (around 12/4/2021),                     Last appt 01/14/2022      Last Written 10/20/2021    pramipexole (MIRAPEX) 0.25 MG tablet  #90  1 RF       Refilled medication per verbal order from provider.

## 2022-02-22 ENCOUNTER — HOSPITAL ENCOUNTER (EMERGENCY)
Age: 57
Discharge: HOME | End: 2022-02-22
Attending: EMERGENCY MEDICINE
Payer: MEDICARE

## 2022-02-22 ENCOUNTER — APPOINTMENT (OUTPATIENT)
Dept: GENERAL RADIOLOGY | Age: 57
End: 2022-02-22
Payer: MEDICARE

## 2022-02-22 VITALS
RESPIRATION RATE: 15 BRPM | TEMPERATURE: 98.2 F | OXYGEN SATURATION: 98 % | DIASTOLIC BLOOD PRESSURE: 86 MMHG | SYSTOLIC BLOOD PRESSURE: 128 MMHG | HEART RATE: 101 BPM

## 2022-02-22 DIAGNOSIS — Z98.890 HISTORY OF ANKLE SURGERY: ICD-10-CM

## 2022-02-22 DIAGNOSIS — S99.912A INJURY OF LEFT ANKLE, INITIAL ENCOUNTER: Primary | ICD-10-CM

## 2022-02-22 PROCEDURE — 73610 X-RAY EXAM OF ANKLE: CPT

## 2022-02-22 PROCEDURE — 6370000000 HC RX 637 (ALT 250 FOR IP): Performed by: EMERGENCY MEDICINE

## 2022-02-22 PROCEDURE — 99285 EMERGENCY DEPT VISIT HI MDM: CPT

## 2022-02-22 RX ORDER — IBUPROFEN 400 MG/1
800 TABLET ORAL ONCE
Status: COMPLETED | OUTPATIENT
Start: 2022-02-22 | End: 2022-02-22

## 2022-02-22 RX ORDER — HYDROCODONE BITARTRATE AND ACETAMINOPHEN 5; 325 MG/1; MG/1
1 TABLET ORAL ONCE
Status: COMPLETED | OUTPATIENT
Start: 2022-02-22 | End: 2022-02-22

## 2022-02-22 RX ORDER — HYDROCODONE BITARTRATE AND ACETAMINOPHEN 5; 325 MG/1; MG/1
1 TABLET ORAL EVERY 4 HOURS PRN
Qty: 8 TABLET | Refills: 0 | Status: SHIPPED | OUTPATIENT
Start: 2022-02-22 | End: 2022-02-25

## 2022-02-22 RX ORDER — IBUPROFEN 800 MG/1
800 TABLET ORAL EVERY 8 HOURS PRN
Qty: 21 TABLET | Refills: 0 | Status: ON HOLD | OUTPATIENT
Start: 2022-02-22 | End: 2022-03-19 | Stop reason: HOSPADM

## 2022-02-22 RX ADMIN — HYDROCODONE BITARTRATE AND ACETAMINOPHEN 1 TABLET: 5; 325 TABLET ORAL at 22:35

## 2022-02-22 RX ADMIN — IBUPROFEN 800 MG: 400 TABLET, FILM COATED ORAL at 22:35

## 2022-02-22 ASSESSMENT — PAIN SCALES - GENERAL: PAINLEVEL_OUTOF10: 8

## 2022-02-22 ASSESSMENT — PAIN DESCRIPTION - ORIENTATION: ORIENTATION: LEFT

## 2022-02-22 ASSESSMENT — PAIN DESCRIPTION - LOCATION: LOCATION: ANKLE

## 2022-02-22 ASSESSMENT — PAIN DESCRIPTION - DESCRIPTORS: DESCRIPTORS: ACHING

## 2022-02-23 NOTE — ED PROVIDER NOTES
CHIEF COMPLAINT  Ankle Pain (left ankle injury today. Patient has hx of injury to same ankle in past)      HISTORY OF PRESENT ILLNESS  Radha Naqvi is a 55 y.o. female presents to the ED with left ankle pain, rolled it today, 8 out of 10, aching, tried over-the-counter pain medication/Tylenol without much improvement earlier, she is also on gabapentin. She states she cannot tolerate weightbearing, has been using crutches, she did have prior surgery on this ankle last year, ORIF about a year ago, sees Dr. Wendy Humphreys, no head injury or loss of consciousness, denies any other injuries, no knee or hip pain, no other complaints, modifying factors or associated symptoms. I have reviewed the following from the nursing documentation.     Past Medical History:   Diagnosis Date    Asthma     Bipolar disorder     Carpal tunnel syndrome     COPD (chronic obstructive pulmonary disease) (HCC)     Fibromyalgia     GERD (gastroesophageal reflux disease)     Hyperlipidemia     Irritable bowel syndrome     Manic depression (HCC)     PONV (postoperative nausea and vomiting)     PTSD (post-traumatic stress disorder)     Schizophrenia      Past Surgical History:   Procedure Laterality Date    ANKLE FRACTURE SURGERY Left 2/12/2021    LEFT ANKLE OPEN REDUCTION INTERNAL FIXATION   performed by Karly Bob DO at [address] [place] [address] Rd      DISCECTOMY L3    BREAST BIOPSY Right     LUMPECTOMY, BENIGN    CARPAL TUNNEL RELEASE Bilateral     MULTIPLE    HIP SURGERY Left     BONE SPUR    KNEE ARTHROCENTESIS Left 10/27/2017    left knee medial compartment arthroplasty    KNEE ARTHROPLASTY Right 12/09/2016    RIGHT PARTIAL KNEE ARTHROPLASTY    KNEE ARTHROPLASTY Right 02/17/2017    right Knee open medial compartment arthroplasty evaluation, irrigation and debridement     KNEE ARTHROSCOPY Left     OVARIAN CYST SURGERY      right     TONSILLECTOMY AND ADENOIDECTOMY      TUBAL LIGATION R TUBELECTOMY      Family History   Problem Relation Age of Onset    Breast Cancer Maternal Grandmother         over 48     Social History     Socioeconomic History    Marital status:      Spouse name: Not on file    Number of children: Not on file    Years of education: Not on file    Highest education level: Not on file   Occupational History    Not on file   Tobacco Use    Smoking status: Former Smoker     Packs/day: 0.50     Types: Cigarettes     Quit date: 10/17/2007     Years since quittin.3    Smokeless tobacco: Never Used   Substance and Sexual Activity    Alcohol use: Yes     Comment: rare    Drug use: No    Sexual activity: Yes     Partners: Male   Other Topics Concern    Not on file   Social History Narrative    Not on file     Social Determinants of Health     Financial Resource Strain:     Difficulty of Paying Living Expenses: Not on file   Food Insecurity:     Worried About Running Out of Food in the Last Year: Not on file    Denilson of Food in the Last Year: Not on file   Transportation Needs:     Lack of Transportation (Medical): Not on file    Lack of Transportation (Non-Medical):  Not on file   Physical Activity:     Days of Exercise per Week: Not on file    Minutes of Exercise per Session: Not on file   Stress:     Feeling of Stress : Not on file   Social Connections:     Frequency of Communication with Friends and Family: Not on file    Frequency of Social Gatherings with Friends and Family: Not on file    Attends Shinto Services: Not on file    Active Member of Clubs or Organizations: Not on file    Attends Club or Organization Meetings: Not on file    Marital Status: Not on file   Intimate Partner Violence:     Fear of Current or Ex-Partner: Not on file    Emotionally Abused: Not on file    Physically Abused: Not on file    Sexually Abused: Not on file   Housing Stability:     Unable to Pay for Housing in the Last Year: Not on file    Number of Places Lived in the Last Year: Not on file    Unstable Housing in the Last Year: Not on file     No current facility-administered medications for this encounter. Current Outpatient Medications   Medication Sig Dispense Refill    HYDROcodone-acetaminophen (NORCO) 5-325 MG per tablet Take 1 tablet by mouth every 4 hours as needed for Pain for up to 3 days. Intended supply: 3 days.  Take lowest dose possible to manage pain 8 tablet 0    ibuprofen (ADVIL;MOTRIN) 800 MG tablet Take 1 tablet by mouth every 8 hours as needed for Pain 21 tablet 0    pramipexole (MIRAPEX) 0.25 MG tablet TAKE 1 TABLET BY MOUTH 3 TIMES DAILY 90 tablet 1    LINZESS 145 MCG capsule TAKE 1 CAPSULE EVERY MORNING (BEFORE BREAKFAST) 30 capsule 5    ondansetron (ZOFRAN) 4 MG tablet 1 tablet every 8 hours as needed for nausea and vomiting 30 tablet 3    SAVELLA 25 MG TABS TAKE 2 TABLETS TWICE DAILY 120 tablet 5    Handicap Placard MISC by Does not apply route  2022 1 each 0    acetaminophen (AMINOFEN) 325 MG tablet Take 2 tablets by mouth every 6 hours as needed for Pain 90 tablet 0    montelukast (SINGULAIR) 10 MG tablet Take 1 tablet by mouth nightly 30 tablet 1    albuterol sulfate HFA (PROAIR HFA) 108 (90 Base) MCG/ACT inhaler USE 1-2 PUFFS EVERY 4    HOURS AS NEEDED FORWHEEZING 1 Inhaler 5    meloxicam (MOBIC) 15 MG tablet Take 1 tablet by mouth daily 10 tablet 0    prazosin (MINIPRESS) 1 MG capsule       Omega-3 Fatty Acids (FISH OIL PO) Take by mouth      buPROPion (WELLBUTRIN) 75 MG tablet Take 75 mg by mouth daily       hydrOXYzine (VISTARIL) 25 MG capsule Take 25 mg by mouth 2 times daily       lamoTRIgine (LAMICTAL) 100 MG tablet TAKE 1 TABLET BY MOUTH TWICE A DAY      sertraline (ZOLOFT) 50 MG tablet Take 100 mg by mouth daily       aspirin 81 MG EC tablet Take 81 mg by mouth daily      Multiple Vitamin (MULTI-VITAMIN PO) Take by mouth      VITAMIN D PO Take by mouth      Ascorbic Acid (VITAMIN C PO) Take by mouth      Coenzyme Q10 (COQ10 PO) Take by mouth      gabapentin (NEURONTIN) 400 MG capsule Take 800 mg by mouth 3 times daily.  OLANZapine (ZYPREXA) 20 MG tablet nightly        Allergies   Allergen Reactions    Calamine Hives    Diphenhydramine     Diphenhydramine Hcl Hives    Dust Mite Extract Other (See Comments)    Macadamia Nut Oil Other (See Comments)    Pramoxine-Calamine Hives    Seasonal      Other reaction(s): Cough    Erythromycin Palpitations       REVIEW OF SYSTEMS  10 systems reviewed, pertinent positives per HPI otherwise noted to be negative. PHYSICAL EXAM  /86   Pulse 101   Temp 98.2 °F (36.8 °C) (Oral)   Resp 15   LMP 10/03/2017 Comment: partial hys 1 ovary   SpO2 98%   GENERAL APPEARANCE: Awake and alert. Cooperative. No acute distress  HEAD: Normocephalic. Atraumatic. EYES: PERRL. EOM's grossly intact. ENT: Mucous membranes are moist.  Airway patent, no stridor  NECK/back: Supple. No rigidity, no midline tenderness or step-offs  HEART: RRR. No murmurs  LUNGS: Respirations nonlabored. Lungs are clear to auscultation bilaterally. ABDOMEN: Soft. Non-distended. Non-tender. No guarding or rebound. EXTREMITIES: Mild edema of left ankle, well-healed surgical scars, though she does have some paresthesia of the foot, distal sensation intact in all digits, less than 2-second cap refill in all digits, 2+ DP and PT pulses. No significant left knee tenderness to palpation normal range of motion, but she has full range of motion in the left knee, range of motion limited in the left ankle due to pain, can move the digits with some discomfort, tendon exam intact, pain with dorsiflexion/plantarflexion of the ankle, and pain with inversion/eversion, no crepitus, all extremities neurovascularly intact. Soft compartments in left lower extremity, no visible deformities  SKIN: Warm and dry. No acute rashes. NEUROLOGICAL: Alert and oriented. No gross facial drooping.   Normal speech, walking with crutches, not bearing weight on left foot  PSYCHIATRIC: Normal mood and affect. RADIOLOGY  XR ANKLE LEFT (MIN 3 VIEWS)    Result Date: 2/22/2022  EXAMINATION: THREE XRAY VIEWS OF THE LEFT ANKLE 2/22/2022 6:24 pm COMPARISON: Left foot x-rays 02/04/2021 HISTORY: ORDERING SYSTEM PROVIDED HISTORY: injury TECHNOLOGIST PROVIDED HISTORY: Reason for exam:->injury Reason for Exam: ankle injury, ankle pain FINDINGS: Interval placement of fixation plate and screws in the distal fibula and of fixation screws in the medial malleolus. Medial malleolar fracture does not appear united. Fibular fracture appears united. No acute fracture or dislocation are evident. The medial ankle mortise clear space is not widened. Degenerative changes of the midfoot and of the ankle. Heel spur. Achilles tendon enthesophyte at the calcaneus. No acute fracture or dislocation. Fixation hardware in the ankle and old ankle fractures as described above. Degenerative changes. ED COURSE/MDM  Patient seen and evaluated. Old records reviewed. Labs and imaging reviewed and results discussed with patient. 78-year-old female with left ankle injury, with prior surgery to this ankle, hardware in place, x-ray with no acute process, she was given a walking boot, she already had crutches with her, given ice pack, Norco and ibuprofen, prescriptions for home, heart rate had normalized on my exam, encouraged to follow-up with her orthopedic surgeon, she was having significant pain with attempt to walk, likely just sprained, but given the prior surgery encouraged nonweightbearing status until cleared by Ortho, extremity neurovascularly intact, no suspicion for compartment syndrome, strict return precautions given, all questions answered, will return if any worsening symptoms or new concerns, see AVS for further discharge information, patient verbalized understanding of plan, felt comfortable going home.       Orders Placed This Encounter   Procedures    XR ANKLE LEFT (MIN 3 VIEWS)    Apply ice to affected area    [facility] Sirianell SzymanskiAir Select Low Top, Left; MD (M7-10/F8-11)     Orders Placed This Encounter   Medications    HYDROcodone-acetaminophen (NORCO) 5-325 MG per tablet 1 tablet    ibuprofen (ADVIL;MOTRIN) tablet 800 mg    HYDROcodone-acetaminophen (NORCO) 5-325 MG per tablet     Sig: Take 1 tablet by mouth every 4 hours as needed for Pain for up to 3 days. Intended supply: 3 days. Take lowest dose possible to manage pain     Dispense:  8 tablet     Refill:  0    ibuprofen (ADVIL;MOTRIN) 800 MG tablet     Sig: Take 1 tablet by mouth every 8 hours as needed for Pain     Dispense:  21 tablet     Refill:  0          CLINICAL IMPRESSION  1. Injury of left ankle, initial encounter    2. History of ankle surgery        Blood pressure 128/86, pulse 101, temperature 98.2 °F (36.8 °C), temperature source Oral, resp. rate 15, last menstrual period 10/03/2017, SpO2 98 %. DISPOSITION  Xu Arauz was discharged to home in stable condition.                    Cee Valerio, DO  03/06/22 1443

## 2022-03-08 NOTE — TELEPHONE ENCOUNTER
Future appt scheduled 0 appt scheduled                    Last appt 01/14/2022      Last Written 12/22/2020    albuterol sulfate HFA (PROAIR HFA) 108 (90 Base) MCG/ACT inhaler  #1 inhaler   5 Rf

## 2022-03-17 ENCOUNTER — HOSPITAL ENCOUNTER (EMERGENCY)
Age: 57
Discharge: ANOTHER HOSPITAL | End: 2022-03-18
Attending: EMERGENCY MEDICINE
Payer: MEDICARE

## 2022-03-17 ENCOUNTER — APPOINTMENT (OUTPATIENT)
Dept: GENERAL RADIOLOGY | Age: 57
End: 2022-03-17
Payer: MEDICARE

## 2022-03-17 DIAGNOSIS — R07.2 PRECORDIAL CHEST PAIN: Primary | ICD-10-CM

## 2022-03-17 LAB
ANION GAP SERPL CALCULATED.3IONS-SCNC: 12 MMOL/L (ref 3–16)
BASOPHILS ABSOLUTE: 0 K/UL (ref 0–0.2)
BASOPHILS RELATIVE PERCENT: 0.4 %
BUN BLDV-MCNC: 10 MG/DL (ref 7–20)
CALCIUM SERPL-MCNC: 9.4 MG/DL (ref 8.3–10.6)
CHLORIDE BLD-SCNC: 103 MMOL/L (ref 99–110)
CO2: 26 MMOL/L (ref 21–32)
CREAT SERPL-MCNC: 1 MG/DL (ref 0.6–1.1)
D DIMER: <200 NG/ML DDU (ref 0–229)
EOSINOPHILS ABSOLUTE: 0.2 K/UL (ref 0–0.6)
EOSINOPHILS RELATIVE PERCENT: 1.8 %
GFR AFRICAN AMERICAN: >60
GFR NON-AFRICAN AMERICAN: 57
GLUCOSE BLD-MCNC: 117 MG/DL (ref 70–99)
HCT VFR BLD CALC: 39.3 % (ref 36–48)
HEMOGLOBIN: 13.1 G/DL (ref 12–16)
LYMPHOCYTES ABSOLUTE: 3.2 K/UL (ref 1–5.1)
LYMPHOCYTES RELATIVE PERCENT: 23.7 %
MAGNESIUM: 1.9 MG/DL (ref 1.8–2.4)
MCH RBC QN AUTO: 31.3 PG (ref 26–34)
MCHC RBC AUTO-ENTMCNC: 33.4 G/DL (ref 31–36)
MCV RBC AUTO: 93.8 FL (ref 80–100)
MONOCYTES ABSOLUTE: 1.1 K/UL (ref 0–1.3)
MONOCYTES RELATIVE PERCENT: 8 %
NEUTROPHILS ABSOLUTE: 9 K/UL (ref 1.7–7.7)
NEUTROPHILS RELATIVE PERCENT: 66.1 %
PDW BLD-RTO: 12.8 % (ref 12.4–15.4)
PLATELET # BLD: 328 K/UL (ref 135–450)
PMV BLD AUTO: 7.5 FL (ref 5–10.5)
POTASSIUM REFLEX MAGNESIUM: 3.5 MMOL/L (ref 3.5–5.1)
RBC # BLD: 4.2 M/UL (ref 4–5.2)
SARS-COV-2, NAAT: NOT DETECTED
SODIUM BLD-SCNC: 141 MMOL/L (ref 136–145)
TROPONIN: <0.01 NG/ML
WBC # BLD: 13.6 K/UL (ref 4–11)

## 2022-03-17 PROCEDURE — 93005 ELECTROCARDIOGRAM TRACING: CPT | Performed by: EMERGENCY MEDICINE

## 2022-03-17 PROCEDURE — 87635 SARS-COV-2 COVID-19 AMP PRB: CPT

## 2022-03-17 PROCEDURE — 84484 ASSAY OF TROPONIN QUANT: CPT

## 2022-03-17 PROCEDURE — 71045 X-RAY EXAM CHEST 1 VIEW: CPT

## 2022-03-17 PROCEDURE — 83735 ASSAY OF MAGNESIUM: CPT

## 2022-03-17 PROCEDURE — 85379 FIBRIN DEGRADATION QUANT: CPT

## 2022-03-17 PROCEDURE — 36415 COLL VENOUS BLD VENIPUNCTURE: CPT

## 2022-03-17 PROCEDURE — 80048 BASIC METABOLIC PNL TOTAL CA: CPT

## 2022-03-17 PROCEDURE — 85025 COMPLETE CBC W/AUTO DIFF WBC: CPT

## 2022-03-17 PROCEDURE — 6370000000 HC RX 637 (ALT 250 FOR IP): Performed by: EMERGENCY MEDICINE

## 2022-03-17 PROCEDURE — 99285 EMERGENCY DEPT VISIT HI MDM: CPT

## 2022-03-17 RX ORDER — ASPIRIN 325 MG
325 TABLET ORAL ONCE
Status: COMPLETED | OUTPATIENT
Start: 2022-03-17 | End: 2022-03-17

## 2022-03-17 RX ORDER — DIAZEPAM 5 MG/1
10 TABLET ORAL ONCE
Status: COMPLETED | OUTPATIENT
Start: 2022-03-17 | End: 2022-03-17

## 2022-03-17 RX ORDER — IBUPROFEN 400 MG/1
400 TABLET ORAL ONCE
Status: COMPLETED | OUTPATIENT
Start: 2022-03-17 | End: 2022-03-17

## 2022-03-17 RX ADMIN — ASPIRIN 325 MG: 325 TABLET ORAL at 21:50

## 2022-03-17 RX ADMIN — IBUPROFEN 400 MG: 400 TABLET, FILM COATED ORAL at 21:46

## 2022-03-17 RX ADMIN — DIAZEPAM 10 MG: 5 TABLET ORAL at 21:46

## 2022-03-17 RX ADMIN — NITROGLYCERIN 0.5 INCH: 20 OINTMENT TOPICAL at 22:14

## 2022-03-17 ASSESSMENT — PAIN DESCRIPTION - ORIENTATION
ORIENTATION: LEFT
ORIENTATION: LEFT

## 2022-03-17 ASSESSMENT — PAIN DESCRIPTION - DESCRIPTORS
DESCRIPTORS: CONSTANT
DESCRIPTORS: CONSTANT

## 2022-03-17 ASSESSMENT — PAIN SCALES - GENERAL
PAINLEVEL_OUTOF10: 9
PAINLEVEL_OUTOF10: 10

## 2022-03-17 ASSESSMENT — PAIN DESCRIPTION - PAIN TYPE
TYPE: ACUTE PAIN
TYPE: ACUTE PAIN

## 2022-03-17 ASSESSMENT — PAIN DESCRIPTION - FREQUENCY: FREQUENCY: CONTINUOUS

## 2022-03-17 ASSESSMENT — PAIN - FUNCTIONAL ASSESSMENT: PAIN_FUNCTIONAL_ASSESSMENT: 0-10

## 2022-03-17 ASSESSMENT — PAIN DESCRIPTION - LOCATION: LOCATION: SHOULDER

## 2022-03-17 ASSESSMENT — HEART SCORE: ECG: 1

## 2022-03-18 ENCOUNTER — HOSPITAL ENCOUNTER (OUTPATIENT)
Age: 57
Setting detail: OBSERVATION
Discharge: HOME | End: 2022-03-19
Attending: INTERNAL MEDICINE | Admitting: INTERNAL MEDICINE
Payer: MEDICARE

## 2022-03-18 ENCOUNTER — APPOINTMENT (OUTPATIENT)
Dept: CT IMAGING | Age: 57
End: 2022-03-18
Attending: INTERNAL MEDICINE
Payer: MEDICARE

## 2022-03-18 VITALS
WEIGHT: 192 LBS | BODY MASS INDEX: 35.33 KG/M2 | OXYGEN SATURATION: 98 % | HEART RATE: 95 BPM | DIASTOLIC BLOOD PRESSURE: 82 MMHG | TEMPERATURE: 98.5 F | SYSTOLIC BLOOD PRESSURE: 111 MMHG | HEIGHT: 62 IN | RESPIRATION RATE: 16 BRPM

## 2022-03-18 LAB
ANION GAP SERPL CALCULATED.3IONS-SCNC: 8 MMOL/L (ref 3–16)
BUN BLDV-MCNC: 14 MG/DL (ref 7–20)
CALCIUM SERPL-MCNC: 9.7 MG/DL (ref 8.3–10.6)
CHLORIDE BLD-SCNC: 107 MMOL/L (ref 99–110)
CHOLESTEROL, TOTAL: 203 MG/DL (ref 0–199)
CO2: 26 MMOL/L (ref 21–32)
CREAT SERPL-MCNC: 0.9 MG/DL (ref 0.6–1.1)
EKG ATRIAL RATE: 97 BPM
EKG DIAGNOSIS: NORMAL
EKG P AXIS: 64 DEGREES
EKG P-R INTERVAL: 164 MS
EKG Q-T INTERVAL: 354 MS
EKG QRS DURATION: 80 MS
EKG QTC CALCULATION (BAZETT): 449 MS
EKG R AXIS: 32 DEGREES
EKG T AXIS: 23 DEGREES
EKG VENTRICULAR RATE: 97 BPM
GFR AFRICAN AMERICAN: >60
GFR NON-AFRICAN AMERICAN: >60
GLUCOSE BLD-MCNC: 145 MG/DL (ref 70–99)
GONADOTROPIN, CHORIONIC (HCG) QUANT: <5 MIU/ML
HCG QUALITATIVE: NEGATIVE
HCG QUALITATIVE: POSITIVE
HCT VFR BLD CALC: 38 % (ref 36–48)
HDLC SERPL-MCNC: 39 MG/DL (ref 40–60)
HEMOGLOBIN: 12.7 G/DL (ref 12–16)
LDL CHOLESTEROL CALCULATED: ABNORMAL MG/DL
LDL CHOLESTEROL DIRECT: 108 MG/DL
LV EF: 58 %
LVEF MODALITY: NORMAL
MCH RBC QN AUTO: 31.8 PG (ref 26–34)
MCHC RBC AUTO-ENTMCNC: 33.5 G/DL (ref 31–36)
MCV RBC AUTO: 94.9 FL (ref 80–100)
PDW BLD-RTO: 13.2 % (ref 12.4–15.4)
PLATELET # BLD: 319 K/UL (ref 135–450)
PMV BLD AUTO: 7.6 FL (ref 5–10.5)
POTASSIUM REFLEX MAGNESIUM: 4.1 MMOL/L (ref 3.5–5.1)
RBC # BLD: 4 M/UL (ref 4–5.2)
SODIUM BLD-SCNC: 141 MMOL/L (ref 136–145)
TRIGL SERPL-MCNC: 356 MG/DL (ref 0–150)
TROPONIN: <0.01 NG/ML
TROPONIN: <0.01 NG/ML
VLDLC SERPL CALC-MCNC: ABNORMAL MG/DL
WBC # BLD: 9.5 K/UL (ref 4–11)

## 2022-03-18 PROCEDURE — G0378 HOSPITAL OBSERVATION PER HR: HCPCS

## 2022-03-18 PROCEDURE — G0379 DIRECT REFER HOSPITAL OBSERV: HCPCS

## 2022-03-18 PROCEDURE — 96376 TX/PRO/DX INJ SAME DRUG ADON: CPT

## 2022-03-18 PROCEDURE — 84484 ASSAY OF TROPONIN QUANT: CPT

## 2022-03-18 PROCEDURE — 75571 CT HRT W/O DYE W/CA TEST: CPT

## 2022-03-18 PROCEDURE — 93306 TTE W/DOPPLER COMPLETE: CPT

## 2022-03-18 PROCEDURE — 80061 LIPID PANEL: CPT

## 2022-03-18 PROCEDURE — 84703 CHORIONIC GONADOTROPIN ASSAY: CPT

## 2022-03-18 PROCEDURE — 2580000003 HC RX 258: Performed by: NURSE PRACTITIONER

## 2022-03-18 PROCEDURE — 6370000000 HC RX 637 (ALT 250 FOR IP): Performed by: NURSE PRACTITIONER

## 2022-03-18 PROCEDURE — 6360000002 HC RX W HCPCS: Performed by: NURSE PRACTITIONER

## 2022-03-18 PROCEDURE — 36415 COLL VENOUS BLD VENIPUNCTURE: CPT

## 2022-03-18 PROCEDURE — 96374 THER/PROPH/DIAG INJ IV PUSH: CPT

## 2022-03-18 PROCEDURE — 80048 BASIC METABOLIC PNL TOTAL CA: CPT

## 2022-03-18 PROCEDURE — 93010 ELECTROCARDIOGRAM REPORT: CPT | Performed by: INTERNAL MEDICINE

## 2022-03-18 PROCEDURE — 96372 THER/PROPH/DIAG INJ SC/IM: CPT

## 2022-03-18 PROCEDURE — 85027 COMPLETE CBC AUTOMATED: CPT

## 2022-03-18 PROCEDURE — 99204 OFFICE O/P NEW MOD 45 MIN: CPT | Performed by: INTERNAL MEDICINE

## 2022-03-18 PROCEDURE — 84702 CHORIONIC GONADOTROPIN TEST: CPT

## 2022-03-18 PROCEDURE — 6360000002 HC RX W HCPCS: Performed by: INTERNAL MEDICINE

## 2022-03-18 PROCEDURE — 6370000000 HC RX 637 (ALT 250 FOR IP): Performed by: INTERNAL MEDICINE

## 2022-03-18 PROCEDURE — 96375 TX/PRO/DX INJ NEW DRUG ADDON: CPT

## 2022-03-18 RX ORDER — PRAMIPEXOLE DIHYDROCHLORIDE 0.25 MG/1
0.25 TABLET ORAL 3 TIMES DAILY
Status: DISCONTINUED | OUTPATIENT
Start: 2022-03-18 | End: 2022-03-19 | Stop reason: HOSPADM

## 2022-03-18 RX ORDER — PRAZOSIN HYDROCHLORIDE 1 MG/1
1 CAPSULE ORAL 2 TIMES DAILY
Status: DISCONTINUED | OUTPATIENT
Start: 2022-03-18 | End: 2022-03-19 | Stop reason: HOSPADM

## 2022-03-18 RX ORDER — POLYETHYLENE GLYCOL 3350 17 G/17G
17 POWDER, FOR SOLUTION ORAL DAILY PRN
Status: DISCONTINUED | OUTPATIENT
Start: 2022-03-18 | End: 2022-03-19 | Stop reason: HOSPADM

## 2022-03-18 RX ORDER — MORPHINE SULFATE 2 MG/ML
2 INJECTION, SOLUTION INTRAMUSCULAR; INTRAVENOUS ONCE
Status: COMPLETED | OUTPATIENT
Start: 2022-03-18 | End: 2022-03-18

## 2022-03-18 RX ORDER — ATORVASTATIN CALCIUM 40 MG/1
40 TABLET, FILM COATED ORAL NIGHTLY
Status: DISCONTINUED | OUTPATIENT
Start: 2022-03-18 | End: 2022-03-19 | Stop reason: HOSPADM

## 2022-03-18 RX ORDER — MONTELUKAST SODIUM 10 MG/1
10 TABLET ORAL NIGHTLY
Status: DISCONTINUED | OUTPATIENT
Start: 2022-03-18 | End: 2022-03-19 | Stop reason: HOSPADM

## 2022-03-18 RX ORDER — ACETAMINOPHEN 650 MG/1
650 SUPPOSITORY RECTAL EVERY 6 HOURS PRN
Status: DISCONTINUED | OUTPATIENT
Start: 2022-03-18 | End: 2022-03-19 | Stop reason: HOSPADM

## 2022-03-18 RX ORDER — BUPROPION HYDROCHLORIDE 75 MG/1
75 TABLET ORAL DAILY
Status: DISCONTINUED | OUTPATIENT
Start: 2022-03-18 | End: 2022-03-19 | Stop reason: HOSPADM

## 2022-03-18 RX ORDER — SODIUM CHLORIDE 0.9 % (FLUSH) 0.9 %
5-40 SYRINGE (ML) INJECTION PRN
Status: DISCONTINUED | OUTPATIENT
Start: 2022-03-18 | End: 2022-03-19 | Stop reason: HOSPADM

## 2022-03-18 RX ORDER — MELOXICAM 7.5 MG/1
15 TABLET ORAL DAILY
Status: DISCONTINUED | OUTPATIENT
Start: 2022-03-18 | End: 2022-03-18

## 2022-03-18 RX ORDER — NITROGLYCERIN 0.4 MG/1
0.4 TABLET SUBLINGUAL EVERY 5 MIN PRN
Status: DISCONTINUED | OUTPATIENT
Start: 2022-03-18 | End: 2022-03-18

## 2022-03-18 RX ORDER — OXYCODONE HYDROCHLORIDE AND ACETAMINOPHEN 5; 325 MG/1; MG/1
1 TABLET ORAL EVERY 6 HOURS PRN
Status: DISCONTINUED | OUTPATIENT
Start: 2022-03-18 | End: 2022-03-19 | Stop reason: HOSPADM

## 2022-03-18 RX ORDER — ALBUTEROL SULFATE 90 UG/1
2 AEROSOL, METERED RESPIRATORY (INHALATION) EVERY 4 HOURS PRN
Status: DISCONTINUED | OUTPATIENT
Start: 2022-03-18 | End: 2022-03-19 | Stop reason: HOSPADM

## 2022-03-18 RX ORDER — HYDROXYZINE PAMOATE 25 MG/1
25 CAPSULE ORAL 2 TIMES DAILY
Status: DISCONTINUED | OUTPATIENT
Start: 2022-03-18 | End: 2022-03-19 | Stop reason: HOSPADM

## 2022-03-18 RX ORDER — KETOROLAC TROMETHAMINE 30 MG/ML
30 INJECTION, SOLUTION INTRAMUSCULAR; INTRAVENOUS EVERY 6 HOURS PRN
Status: DISCONTINUED | OUTPATIENT
Start: 2022-03-18 | End: 2022-03-18

## 2022-03-18 RX ORDER — GABAPENTIN 300 MG/1
600 CAPSULE ORAL 3 TIMES DAILY
Status: DISCONTINUED | OUTPATIENT
Start: 2022-03-18 | End: 2022-03-19 | Stop reason: HOSPADM

## 2022-03-18 RX ORDER — ONDANSETRON 4 MG/1
4 TABLET, ORALLY DISINTEGRATING ORAL EVERY 8 HOURS PRN
Status: DISCONTINUED | OUTPATIENT
Start: 2022-03-18 | End: 2022-03-19 | Stop reason: HOSPADM

## 2022-03-18 RX ORDER — ACETAMINOPHEN 325 MG/1
650 TABLET ORAL EVERY 6 HOURS PRN
Status: DISCONTINUED | OUTPATIENT
Start: 2022-03-18 | End: 2022-03-19 | Stop reason: HOSPADM

## 2022-03-18 RX ORDER — SODIUM CHLORIDE 9 MG/ML
25 INJECTION, SOLUTION INTRAVENOUS PRN
Status: DISCONTINUED | OUTPATIENT
Start: 2022-03-18 | End: 2022-03-19 | Stop reason: HOSPADM

## 2022-03-18 RX ORDER — ACETAMINOPHEN 325 MG/1
650 TABLET ORAL EVERY 6 HOURS PRN
Status: DISCONTINUED | OUTPATIENT
Start: 2022-03-18 | End: 2022-03-18 | Stop reason: SDUPTHER

## 2022-03-18 RX ORDER — LAMOTRIGINE 100 MG/1
100 TABLET ORAL 2 TIMES DAILY
Status: DISCONTINUED | OUTPATIENT
Start: 2022-03-18 | End: 2022-03-19 | Stop reason: HOSPADM

## 2022-03-18 RX ORDER — SODIUM CHLORIDE 9 MG/ML
INJECTION, SOLUTION INTRAVENOUS CONTINUOUS
Status: DISCONTINUED | OUTPATIENT
Start: 2022-03-18 | End: 2022-03-18

## 2022-03-18 RX ORDER — OLANZAPINE 5 MG/1
20 TABLET ORAL NIGHTLY
Status: DISCONTINUED | OUTPATIENT
Start: 2022-03-18 | End: 2022-03-19 | Stop reason: HOSPADM

## 2022-03-18 RX ORDER — ONDANSETRON 2 MG/ML
4 INJECTION INTRAMUSCULAR; INTRAVENOUS EVERY 6 HOURS PRN
Status: DISCONTINUED | OUTPATIENT
Start: 2022-03-18 | End: 2022-03-19 | Stop reason: HOSPADM

## 2022-03-18 RX ORDER — SODIUM CHLORIDE 0.9 % (FLUSH) 0.9 %
5-40 SYRINGE (ML) INJECTION EVERY 12 HOURS SCHEDULED
Status: DISCONTINUED | OUTPATIENT
Start: 2022-03-18 | End: 2022-03-19 | Stop reason: HOSPADM

## 2022-03-18 RX ORDER — ASPIRIN 81 MG/1
81 TABLET ORAL DAILY
Status: DISCONTINUED | OUTPATIENT
Start: 2022-03-18 | End: 2022-03-19 | Stop reason: HOSPADM

## 2022-03-18 RX ADMIN — BUPROPION HYDROCHLORIDE 75 MG: 75 TABLET, FILM COATED ORAL at 08:03

## 2022-03-18 RX ADMIN — MILNACIPRAN HYDROCHLORIDE 25 MG: 12.5 TABLET, FILM COATED ORAL at 21:27

## 2022-03-18 RX ADMIN — DICLOFENAC SODIUM 2 G: 10 GEL TOPICAL at 21:21

## 2022-03-18 RX ADMIN — MONTELUKAST 10 MG: 10 TABLET, FILM COATED ORAL at 21:16

## 2022-03-18 RX ADMIN — LAMOTRIGINE 100 MG: 100 TABLET ORAL at 02:28

## 2022-03-18 RX ADMIN — NITROGLYCERIN 0.5 INCH: 20 OINTMENT TOPICAL at 06:17

## 2022-03-18 RX ADMIN — HYDROXYZINE PAMOATE 25 MG: 25 CAPSULE ORAL at 02:28

## 2022-03-18 RX ADMIN — KETOROLAC TROMETHAMINE 30 MG: 30 INJECTION, SOLUTION INTRAMUSCULAR at 08:02

## 2022-03-18 RX ADMIN — NITROGLYCERIN 0.5 INCH: 20 OINTMENT TOPICAL at 16:56

## 2022-03-18 RX ADMIN — PRAZOSIN HYDROCHLORIDE 1 MG: 1 CAPSULE ORAL at 21:18

## 2022-03-18 RX ADMIN — ATORVASTATIN CALCIUM 40 MG: 40 TABLET, FILM COATED ORAL at 21:18

## 2022-03-18 RX ADMIN — OLANZAPINE 20 MG: 5 TABLET, FILM COATED ORAL at 21:17

## 2022-03-18 RX ADMIN — MORPHINE SULFATE 2 MG: 2 INJECTION, SOLUTION INTRAMUSCULAR; INTRAVENOUS at 09:57

## 2022-03-18 RX ADMIN — GABAPENTIN 600 MG: 300 CAPSULE ORAL at 08:02

## 2022-03-18 RX ADMIN — GABAPENTIN 600 MG: 300 CAPSULE ORAL at 21:19

## 2022-03-18 RX ADMIN — LAMOTRIGINE 100 MG: 100 TABLET ORAL at 21:18

## 2022-03-18 RX ADMIN — HYDROXYZINE PAMOATE 25 MG: 25 CAPSULE ORAL at 08:03

## 2022-03-18 RX ADMIN — HYDROXYZINE PAMOATE 25 MG: 25 CAPSULE ORAL at 21:17

## 2022-03-18 RX ADMIN — SODIUM CHLORIDE: 9 INJECTION, SOLUTION INTRAVENOUS at 02:45

## 2022-03-18 RX ADMIN — OXYCODONE AND ACETAMINOPHEN 1 TABLET: 5; 325 TABLET ORAL at 22:59

## 2022-03-18 RX ADMIN — PRAZOSIN HYDROCHLORIDE 1 MG: 1 CAPSULE ORAL at 08:02

## 2022-03-18 RX ADMIN — NITROGLYCERIN 0.5 INCH: 20 OINTMENT TOPICAL at 12:30

## 2022-03-18 RX ADMIN — DICLOFENAC SODIUM 2 G: 10 GEL TOPICAL at 10:59

## 2022-03-18 RX ADMIN — MILNACIPRAN HYDROCHLORIDE 25 MG: 12.5 TABLET, FILM COATED ORAL at 08:03

## 2022-03-18 RX ADMIN — SODIUM CHLORIDE, PRESERVATIVE FREE 10 ML: 5 INJECTION INTRAVENOUS at 08:05

## 2022-03-18 RX ADMIN — ASPIRIN 81 MG: 81 TABLET, COATED ORAL at 08:03

## 2022-03-18 RX ADMIN — NITROGLYCERIN 0.5 INCH: 20 OINTMENT TOPICAL at 23:09

## 2022-03-18 RX ADMIN — GABAPENTIN 600 MG: 300 CAPSULE ORAL at 12:30

## 2022-03-18 RX ADMIN — LAMOTRIGINE 100 MG: 100 TABLET ORAL at 08:03

## 2022-03-18 RX ADMIN — ACETAMINOPHEN 650 MG: 325 TABLET ORAL at 15:15

## 2022-03-18 RX ADMIN — KETOROLAC TROMETHAMINE 30 MG: 30 INJECTION, SOLUTION INTRAMUSCULAR at 03:01

## 2022-03-18 RX ADMIN — PRAMIPEXOLE DIHYDROCHLORIDE 0.25 MG: 0.25 TABLET ORAL at 12:30

## 2022-03-18 RX ADMIN — MORPHINE SULFATE 2 MG: 2 INJECTION, SOLUTION INTRAMUSCULAR; INTRAVENOUS at 03:58

## 2022-03-18 RX ADMIN — OXYCODONE AND ACETAMINOPHEN 1 TABLET: 5; 325 TABLET ORAL at 16:56

## 2022-03-18 RX ADMIN — PRAMIPEXOLE DIHYDROCHLORIDE 0.25 MG: 0.25 TABLET ORAL at 08:03

## 2022-03-18 RX ADMIN — SERTRALINE 100 MG: 50 TABLET, FILM COATED ORAL at 08:02

## 2022-03-18 RX ADMIN — ENOXAPARIN SODIUM 40 MG: 40 INJECTION SUBCUTANEOUS at 08:02

## 2022-03-18 RX ADMIN — PRAMIPEXOLE DIHYDROCHLORIDE 0.25 MG: 0.25 TABLET ORAL at 21:19

## 2022-03-18 ASSESSMENT — PAIN DESCRIPTION - ONSET
ONSET: SUDDEN
ONSET: ON-GOING
ONSET: ON-GOING
ONSET: SUDDEN

## 2022-03-18 ASSESSMENT — PAIN SCALES - GENERAL
PAINLEVEL_OUTOF10: 10
PAINLEVEL_OUTOF10: 8
PAINLEVEL_OUTOF10: 10
PAINLEVEL_OUTOF10: 10
PAINLEVEL_OUTOF10: 8
PAINLEVEL_OUTOF10: 10
PAINLEVEL_OUTOF10: 7
PAINLEVEL_OUTOF10: 8
PAINLEVEL_OUTOF10: 7

## 2022-03-18 ASSESSMENT — PAIN DESCRIPTION - DESCRIPTORS
DESCRIPTORS: CONSTANT
DESCRIPTORS: CONSTANT;PRESSURE;STABBING
DESCRIPTORS: CONSTANT;PRESSURE
DESCRIPTORS: CONSTANT
DESCRIPTORS: CONSTANT;PRESSURE

## 2022-03-18 ASSESSMENT — PAIN DESCRIPTION - LOCATION
LOCATION: CHEST
LOCATION: CHEST;SHOULDER
LOCATION: CHEST;SHOULDER

## 2022-03-18 ASSESSMENT — PAIN DESCRIPTION - PAIN TYPE
TYPE: ACUTE PAIN

## 2022-03-18 ASSESSMENT — PAIN DESCRIPTION - ORIENTATION
ORIENTATION: LEFT

## 2022-03-18 ASSESSMENT — PAIN DESCRIPTION - DIRECTION: RADIATING_TOWARDS: LEFT ARM AND NECK

## 2022-03-18 ASSESSMENT — PAIN DESCRIPTION - FREQUENCY
FREQUENCY: CONTINUOUS

## 2022-03-18 NOTE — PLAN OF CARE
Result: Quantitative HCG <5     Range:    Gestational Age          Expected HCG values (mIU/ml)   0.2-1 week                            5-50     1-2 weeks                              2-3 weeks                       100-5000     3-4 weeks                     500-10,000     4-5 weeks                    1000-50,000     5-6 weeks                 10,000-100,000     6-8 weeks                 15,000-200,000     2-3 months                10,000-100,000     Not pregnant    Trenton Lau MD  3/18/2022  11:01 AM

## 2022-03-18 NOTE — PROGRESS NOTES
4 Eyes Admission Assessment     I agree as the admission nurse that 2 RN's have performed a thorough Head to Toe Skin Assessment on the patient. ALL assessment sites listed below have been assessed on admission. Areas assessed by both nurses: ***  [x]   Head, Face, and Ears   [x]   Shoulders, Back, and Chest  [x]   Arms, Elbows, and Hands   [x]   Coccyx, Sacrum, and Ischum  [x]   Legs, Feet, and Heels        Does the Patient have Skin Breakdown?   No         Manuel Prevention initiated:  No   Wound Care Orders initiated:  NA      [facility] nurse consulted for Pressure Injury (Stage 3,4, Unstageable, DTI, NWPT, and Complex wounds):  NA      Nurse 1 eSignature: Electronically signed by Norma Ndiaye RN on 3/18/22 at 4:38 AM EDT      Nurse 2 eSignature: Electronically signed by Angelic Santoyo RN on 3/18/22 at 4:38am EDT

## 2022-03-18 NOTE — CARE COORDINATION
CASE MANAGEMENT INITIAL ASSESSMENT      Reviewed chart and completed assessment with patient:  Family present: no  Explained Case Management role/services. Health Care Decision Maker :   Primary Decision Maker: Myles Fofana - [phone]        Admit date/status: OVA 3/18/22  Diagnosis: chest pain     Is this a Readmission?:  No      Insurance: [facility] Medicare     Precert required for SNF: Yes       3 night stay required: No    Living arrangements, Adls, care needs, prior to admission: lives with spouse in apt. IPTA. Durable Medical Equipment at home:  none    Services in the home and/or outpatient, prior to admission:none    Current PCP: ADELSO Salomon    Transportation needs: pt denies    PT/OT recs:na    [address] Notification (HEN):na    Barriers to discharge:none    Plan/comments: pt intends to discharge home without needs from [facility] team. Disposition pending stress test. Please consult CM team if we can be of assistance w/DCP.      Rodolfo Barrera RN

## 2022-03-18 NOTE — PROGRESS NOTES
Hospitalist Progress Note      PCP: JASON Edge - CNP    Date of Admission: 3/18/2022    Chief Complaint:   Chest pain    Hospital Course:      55 y.o. female, with PMH of HTN ,HLD, obesity, COPD, who was a direct admit from [address] to [facility] with chest pain x 1 day. History obtained from the patient and review of EMR. The patient reports that while driving home from fishing today with her S.O. she had a sudden onset of sharp pain originating in her L ear that radiated around to her posterior neck and down her L arm and into her chest which prompted her to come to the ED for further evaluation. She describes the pain as something\"sitting\" on her chest and is a 12-10 in intensity. In addition she c/o HA and SOB. She does endorse that the pain is reproducible upon movement of her L arm, but denies any known injury. She reports taking a Percocet around 1630 yesterday which helped some, followed by Tylenol ES @ 1800 yesterday which helped with her HA. She reports + life stressors and denies having similar complaints prior to this episode. She does not currently follow with cardiology, but does endorse a + family cardiac history. In the emergency room she had a negative troponin as well as a nonischemic EKG. The patient was ultimately transferred to [facility] for further evaluation and treatment. She denied any other associated symptoms as well as any aggravating and/or alleviating factors. At the time of this assessment, the patient was resting comfortably in bed. She currently denies any chest pain, back pain, abdominal pain, shortness of breath, numbness, tingling, N/V/C/D, fever and/or chills. At this point she will be admitted for stress testing and further evaluation of her symptoms. Subjective:   Patient states she has not been sexually active for a year. She also states her last menses was more than 5 years ago. S/P tubal ligation. She denies abd pain. No nausea.  Chest pain radiating into left arm. No fever or chills. Medications:  Reviewed    Infusion Medications    sodium chloride      sodium chloride 75 mL/hr at 03/18/22 0245     Scheduled Medications    aspirin  81 mg Oral Daily    buPROPion  75 mg Oral Daily    gabapentin  600 mg Oral TID    hydrOXYzine  25 mg Oral BID    lamoTRIgine  100 mg Oral BID    linaclotide  145 mcg Oral QAM AC    montelukast  10 mg Oral Nightly    OLANZapine  20 mg Oral Nightly    pramipexole  0.25 mg Oral TID    prazosin  1 mg Oral BID    sertraline  100 mg Oral Daily    milnacipran HCl  25 mg Oral BID    sodium chloride flush  5-40 mL IntraVENous 2 times per day    atorvastatin  40 mg Oral Nightly    enoxaparin  40 mg SubCUTAneous Daily    nitroglycerin  0.5 inch Topical 4 times per day     PRN Meds: albuterol sulfate HFA, sodium chloride flush, sodium chloride, ondansetron **OR** ondansetron, acetaminophen **OR** acetaminophen, polyethylene glycol, perflutren lipid microspheres    No intake or output data in the 24 hours ending 03/18/22 0908    Physical Exam Performed:    /77   Pulse 78   Temp 98.1 °F (36.7 °C) (Oral)   Resp 18   Ht 5' 6\" (1.676 m)   Wt 198 lb 12.8 oz (90.2 kg)   LMP 10/03/2017 Comment: partial hys 1 ovary   SpO2 96%   BMI 32.09 kg/m²     General appearance: No apparent distress, appears stated age and cooperative. HEENT: Pupils equal, round, and reactive to light. Conjunctivae/corneas clear. Neck: Supple, with full range of motion. No jugular venous distention. Trachea midline. Respiratory:  Normal respiratory effort. Clear to auscultation, bilaterally without Rales/Wheezes/Rhonchi. Cardiovascular: Regular rate and rhythm with normal S1/S2 without murmurs, rubs or gallops. Tender anterior chest to palpation. Abdomen: Soft, non-tender, non-distended with normal bowel sounds. Musculoskeletal: No clubbing, cyanosis or edema bilaterally. Full range of motion without deformity.   Skin: Skin color, texture, turgor normal.  No rashes or lesions. Healed incisions knees bilaterally. Neurologic:  Neurovascularly intact without any focal sensory/motor deficits. Cranial nerves: II-XII intact, grossly non-focal.  Psychiatric: Alert and oriented, thought content appropriate, normal insight  Capillary Refill: Brisk,3 seconds, normal   Peripheral Pulses: +2 palpable, equal bilaterally       Labs:   Recent Labs     03/17/22 2146 03/18/22  0635   WBC 13.6* 9.5   HGB 13.1 12.7   HCT 39.3 38.0    319     Recent Labs     03/17/22 2146 03/18/22  0635    141   K 3.5 4.1    107   CO2 26 26   BUN 10 14   CREATININE 1.0 0.9   CALCIUM 9.4 9.7     No results for input(s): AST, ALT, BILIDIR, BILITOT, ALKPHOS in the last 72 hours. No results for input(s): INR in the last 72 hours. Recent Labs     03/17/22 2146 03/18/22  0250 03/18/22  0635   TROPONINI <0.01 <0.01 <0.01       Urinalysis:      Lab Results   Component Value Date    NITRU Negative 11/28/2016    BLOODU Negative 11/28/2016    SPECGRAV >=1.030 11/28/2016    GLUCOSEU Negative 11/28/2016       Radiology:  No orders to display     Results for Gi Bueno" (MRN [id number]) as of 3/18/2022 09:17   Ref. Range 3/18/2022 06:35   hCG Qual Latest Ref Range: Detects HCG level >10 MIU/mL  POSITIVE         Assessment/Plan:    Active Hospital Problems    Diagnosis     COPD (chronic obstructive pulmonary disease) ([address] [J44.9]     Obesity [E66.9]     Chest pain [R07.9]     Bipolar disorder ([address] [F31.9]     Hyperlipidemia [E78.5]      1. Positive HCG - Unlikely pregnancy. She denies sexually active >1 year and states she is post menopausal. S/P tubal ligation. Check quantitative HCG. If elevated will get GYN consult and may need abd ultrasound. 2. Chest pain - Severe. No known cardiac disease. Cardiology consult. Defer GXT until pregnancy ruled out? Give morphine dose x 1.  3. HTN - Continue to monitor. 4. Mood disorder - Continue home meds.  If pregnant will need to reassess all medications for safety.     DVT Prophylaxis: Lovenox  Diet: Diet NPO  Diet NPO  Code Status: Full Code    PT/OT Eval Status: Ambulatory    Dispo - Home when stable    Pk Mendez MD

## 2022-03-18 NOTE — ED NOTES
Verbal handoff report given to Arleen Arrington RN, POC transferred at this time. All question answered, patient stable without distress.       Heather Mccauley RN  03/17/22 9026

## 2022-03-18 NOTE — CONSULTS
[facility]  [phone]      Attending Physician: Rodolfo Julien MD  Reason for Consultation/Chief Complaint: Chest pain    Subjective   History of Present Illness:  Modesto Ziegler is a 55 y.o. patient who presented to the hospital with complaints of chest pain, patient describes a severe sharp substernal left-sided chest pain going on for last 2 days, patient was admitted to the hospital today, serial troponin levels were negative. Patient continues to have chest pain. She notes associated shortness of breath. She denies prior cardiac history. Chronically, she does have hypercholesterolemia, hypertension, and mental health disorder, these conditions were felt to be partially treated on prescribed medical therapy. Past Medical History:   has a past medical history of Asthma, Bipolar disorder, Carpal tunnel syndrome, COPD (chronic obstructive pulmonary disease) ([address] Fibromyalgia, GERD (gastroesophageal reflux disease), Hyperlipidemia, Irritable bowel syndrome, Manic depression ([address] PONV (postoperative nausea and vomiting), PTSD (post-traumatic stress disorder), and Schizophrenia. Surgical History:   has a past surgical history that includes Tubal ligation; Ovarian cyst surgery; Tonsillectomy and adenoidectomy; hip surgery (Left); Carpal tunnel release (Bilateral); back surgery; Knee Arthroplasty (Right, 12/09/2016); Breast biopsy (Right); Knee arthroscopy (Left); Knee Arthroplasty (Right, 02/17/2017); Appendectomy; Knee Arthrocentesis (Left, 10/27/2017); and Ankle fracture surgery (Left, 2/12/2021). Social History:   reports that she quit smoking about 14 years ago. Her smoking use included cigarettes. She smoked 0.50 packs per day. She has never used smokeless tobacco. She reports current alcohol use. She reports that she does not use drugs. Family History:  family history includes Breast Cancer in her maternal grandmother.       Home Medications:  Were reviewed and are listed in nursing record and/or below  Prior to Admission medications    Medication Sig Start Date End Date Taking?  Authorizing Provider   PROAIR  (67 Base) MCG/ACT inhaler USE 1-2 PUFFS EVERY 4 HOURS AS NEEDED FORWHEEZING 3/8/22   JASON Morejon CNP   ibuprofen (ADVIL;MOTRIN) 800 MG tablet Take 1 tablet by mouth every 8 hours as needed for Pain 2/22/22 3/1/22  Theresa DO   pramipexole (MIRAPEX) 0.25 MG tablet TAKE 1 TABLET BY MOUTH 3 TIMES DAILY 22   JASON Morejon CNP   LINZESS 145 MCG capsule TAKE 1 CAPSULE EVERY MORNING (BEFORE BREAKFAST) 22   JASON Morejon CNP   ondansetron (ZOFRAN) 4 MG tablet 1 tablet every 8 hours as needed for nausea and vomiting 22   JASON Morejon CNP   SAVELLA 25 MG TABS TAKE 2 TABLETS TWICE DAILY 10/20/21   JASON Morejon CNP   Handicap Placard MISC by Does not apply route  21   JASON Morejon CNP   ondansetron (ZOFRAN) 4 MG tablet TAKE 1 TABLET EVERY 8 HOURS AS NEEDED FOR NAUSEA AND VOMITING 21   JASON Morejon CNP   acetaminophen (AMINOFEN) 325 MG tablet Take 2 tablets by mouth every 6 hours as needed for Pain 21   Fitz Johnston DO   montelukast (SINGULAIR) 10 MG tablet Take 1 tablet by mouth nightly 21   JASON Morejon CNP   meloxicam (MOBIC) 15 MG tablet Take 1 tablet by mouth daily 20   JASON Morejon CNP   prazosin (MINIPRESS) 1 MG capsule  10/22/20   Historical Provider, MD   Omega-3 Fatty Acids (FISH OIL PO) Take by mouth    Historical Provider, MD   buPROPion (WELLBUTRIN) 75 MG tablet Take 75 mg by mouth daily  10/22/20   Historical Provider, MD   hydrOXYzine (VISTARIL) 25 MG capsule Take 25 mg by mouth 2 times daily  10/22/20   Historical Provider, MD   lamoTRIgine (LAMICTAL) 100 MG tablet TAKE 1 TABLET BY MOUTH TWICE A DAY 20   Historical Provider, MD   sertraline (ZOLOFT) 50 MG tablet Take 100 mg by mouth daily     Historical Provider, MD   aspirin 81 MG EC tablet Take 81 mg by mouth daily    Historical Provider, MD   Multiple Vitamin (MULTI-VITAMIN PO) Take by mouth    Historical Provider, MD   VITAMIN D PO Take by mouth    Historical Provider, MD   Ascorbic Acid (VITAMIN C PO) Take by mouth    Historical Provider, MD   Coenzyme Q10 (COQ10 PO) Take by mouth    Historical Provider, MD   gabapentin (NEURONTIN) 400 MG capsule Take 800 mg by mouth 3 times daily.   2/2/17   Historical Provider, MD   OLANZapine (ZYPREXA) 20 MG tablet nightly  2/2/17   Historical Provider, MD        CURRENT Medications:  aspirin EC tablet 81 mg, Daily  buPROPion (WELLBUTRIN) tablet 75 mg, Daily  gabapentin (NEURONTIN) capsule 600 mg, TID  hydrOXYzine (VISTARIL) capsule 25 mg, BID  lamoTRIgine (LAMICTAL) tablet 100 mg, BID  linaclotide (LINZESS) capsule 145 mcg, QAM AC  montelukast (SINGULAIR) tablet 10 mg, Nightly  OLANZapine (ZYPREXA) tablet 20 mg, Nightly  pramipexole (MIRAPEX) tablet 0.25 mg, TID  prazosin (MINIPRESS) capsule 1 mg, BID  albuterol sulfate  (90 Base) MCG/ACT inhaler 2 puff, Q4H PRN  sertraline (ZOLOFT) tablet 100 mg, Daily  milnacipran HCl (SAVELLA) tablet 25 mg, BID  sodium chloride flush 0.9 % injection 5-40 mL, 2 times per day  sodium chloride flush 0.9 % injection 5-40 mL, PRN  0.9 % sodium chloride infusion, PRN  ondansetron (ZOFRAN-ODT) disintegrating tablet 4 mg, Q8H PRN   Or  ondansetron (ZOFRAN) injection 4 mg, Q6H PRN  acetaminophen (TYLENOL) tablet 650 mg, Q6H PRN   Or  acetaminophen (TYLENOL) suppository 650 mg, Q6H PRN  polyethylene glycol (GLYCOLAX) packet 17 g, Daily PRN  atorvastatin (LIPITOR) tablet 40 mg, Nightly  perflutren lipid microspheres (DEFINITY) injection 1.65 mg, ONCE PRN  0.9 % sodium chloride infusion, Continuous  enoxaparin (LOVENOX) injection 40 mg, Daily  nitroglycerin (NITRO-BID) 2 % ointment 0.5 inch, 4 times per day  diclofenac sodium (VOLTAREN) 1 % gel 2 g, BID  regadenoson (LEXISCAN) injection 0.4 mg, ONCE PRN        Allergies:  Calamine, Diphenhydramine, Diphenhydramine hcl, Dust mite extract, Macadamia nut oil, Pramoxine-calamine, Seasonal, and Erythromycin     Review of Systems:   A 14 point review of symptoms completed. Pertinent positives identified in the HPI, all other review of symptoms negative as below.       Objective   PHYSICAL EXAM:    Vitals:    03/18/22 0802   BP: 124/77   Pulse: 78   Resp: 18   Temp: 98.1 °F (36.7 °C)   SpO2: 96%    Weight: 198 lb 12.8 oz (90.2 kg)         General Appearance:  Alert, anxious, cooperative, no distress, appears stated age   Head:  Normocephalic, without obvious abnormality, atraumatic   Eyes:  PERRL, conjunctiva/corneas clear   Nose: Nares normal, no drainage or sinus tenderness   Throat: Lips, mucosa, and tongue normal   Neck: Supple, symmetrical, trachea midline, no adenopathy, thyroid: not enlarged, symmetric, no tenderness/mass/nodules, no carotid bruit or JVD   Lungs:   Clear to auscultation bilaterally, respirations unlabored   Chest Wall:   Positive tenderness   Heart:  Regular rate and rhythm, S1, S2 normal, no murmur, rub or gallop   Abdomen:   Soft, non-tender, bowel sounds active all four quadrants,  no masses, no organomegaly   Extremities: Extremities normal, atraumatic, no cyanosis or edema   Pulses: 2+ and symmetric   Skin: Skin color, texture, turgor normal, no rashes or lesions   Pysch:  Anxious mood and affect   Neurologic: Normal gross motor and sensory exam.         Labs   CBC:   Lab Results   Component Value Date    WBC 9.5 03/18/2022    RBC 4.00 03/18/2022    HGB 12.7 03/18/2022    HCT 38.0 03/18/2022    MCV 94.9 03/18/2022    RDW 13.2 03/18/2022     03/18/2022     CMP:  Lab Results   Component Value Date     03/18/2022    K 4.1 03/18/2022     03/18/2022    CO2 26 03/18/2022    BUN 14 03/18/2022    CREATININE 0.9 03/18/2022    GFRAA >60 03/18/2022    GFRAA >60 08/19/2010    AGRATIO 1.8 06/04/2021    LABGLOM >60 03/18/2022 GLUCOSE 145 03/18/2022    PROT 7.1 06/04/2021    CALCIUM 9.7 03/18/2022    BILITOT <0.2 06/04/2021    ALKPHOS 114 06/04/2021    AST 18 06/04/2021    ALT 23 06/04/2021     PT/INR:  No results found for: PTINR  HgBA1c:  Lab Results   Component Value Date    LABA1C 6.3 06/04/2021     Lab Results   Component Value Date    TROPONINI <0.01 03/18/2022         Cardiac Data     Last EKG: Normal sinus rhythm    Echo:    Stress Test:    Cath:    Studies:     cxr       Impression   No radiographic evidence of acute pulmonary disease.             I have reviewed labs and imaging/xray/diagnostic testing in this note. 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 Medical management move S2: Abilities. Continue aspirin    Hypercholesterolemia, continue statin    If noninvasive work-up does not show any high risk features, patient can be discharged from cardiac perspective, and will anticipate signing off, please call with any questions. Thank you for allowing us to participate in the care of Marci Panda. Please call me with any questions [phone].     Naren Jacob MD, [facility]   Interventional Cardiologist  [facility]  [phone] [facility]  [phone] [address] [place] [address]  3/18/2022 11:53 AM

## 2022-03-18 NOTE — ED PROVIDER NOTES
eMERGENCY dEPARTMENT eNCOUnter      [facility]    Chief Complaint   Patient presents with    Shoulder Pain     left shoulder pain, pt rpts pain started in left ear and radiated down neck and left shoulder and chest       HPI    Marci Panda is a 55 y.o. female who presents to the ER for evaluation of shoulder pain back pain ear pain bilateral shoulder pain, history of fibromyalgia schizophrenia emotionally labile, denies true chest pain complained of diffuse body pain, afebrile. States that this is worse than her typical fibromyalgia. Took Neurontin for her bipolar and fibromyalgia. No pulse deficit no trauma. Emotionally labile. No exertional dyspnea or exertional chest pain. No prior history of recent stress test.  Positive smoker. No formal history of hypertension.   History of hyperlipidemia    PAST MEDICAL HISTORY    Past Medical History:   Diagnosis Date    Asthma     Bipolar disorder     Carpal tunnel syndrome     COPD (chronic obstructive pulmonary disease) (HCC)     Fibromyalgia     GERD (gastroesophageal reflux disease)     Hyperlipidemia     Irritable bowel syndrome     Manic depression (HCC)     PONV (postoperative nausea and vomiting)     PTSD (post-traumatic stress disorder)     Schizophrenia        SURGICAL HISTORY    Past Surgical History:   Procedure Laterality Date    ANKLE FRACTURE SURGERY Left 2/12/2021    LEFT ANKLE OPEN REDUCTION INTERNAL FIXATION   performed by Jn Wheeler DO at [address] [place] [address] Rd      DISCECTOMY L3    BREAST BIOPSY Right     LUMPECTOMY, BENIGN    CARPAL TUNNEL RELEASE Bilateral     MULTIPLE    HIP SURGERY Left     BONE SPUR    KNEE ARTHROCENTESIS Left 10/27/2017    left knee medial compartment arthroplasty    KNEE ARTHROPLASTY Right 12/09/2016    RIGHT PARTIAL KNEE ARTHROPLASTY    KNEE ARTHROPLASTY Right 02/17/2017    right Knee open medial compartment arthroplasty evaluation, irrigation and debridement     KNEE ARTHROSCOPY Left     OVARIAN CYST SURGERY      right     TONSILLECTOMY AND ADENOIDECTOMY      TUBAL LIGATION      R TUBELECTOMY        CURRENT MEDICATIONS    Current Outpatient Rx   Medication Sig Dispense Refill    PROAIR  (90 Base) MCG/ACT inhaler USE 1-2 PUFFS EVERY 4 HOURS AS NEEDED FORWHEEZING 8.5 g 5    ibuprofen (ADVIL;MOTRIN) 800 MG tablet Take 1 tablet by mouth every 8 hours as needed for Pain 21 tablet 0    pramipexole (MIRAPEX) 0.25 MG tablet TAKE 1 TABLET BY MOUTH 3 TIMES DAILY 90 tablet 1    LINZESS 145 MCG capsule TAKE 1 CAPSULE EVERY MORNING (BEFORE BREAKFAST) 30 capsule 5    ondansetron (ZOFRAN) 4 MG tablet 1 tablet every 8 hours as needed for nausea and vomiting 30 tablet 3    SAVELLA 25 MG TABS TAKE 2 TABLETS TWICE DAILY 120 tablet 5    Handicap Placard MISC by Does not apply route  2022 1 each 0    acetaminophen (AMINOFEN) 325 MG tablet Take 2 tablets by mouth every 6 hours as needed for Pain 90 tablet 0    montelukast (SINGULAIR) 10 MG tablet Take 1 tablet by mouth nightly 30 tablet 1    meloxicam (MOBIC) 15 MG tablet Take 1 tablet by mouth daily 10 tablet 0    prazosin (MINIPRESS) 1 MG capsule       Omega-3 Fatty Acids (FISH OIL PO) Take by mouth      buPROPion (WELLBUTRIN) 75 MG tablet Take 75 mg by mouth daily       hydrOXYzine (VISTARIL) 25 MG capsule Take 25 mg by mouth 2 times daily       lamoTRIgine (LAMICTAL) 100 MG tablet TAKE 1 TABLET BY MOUTH TWICE A DAY      sertraline (ZOLOFT) 50 MG tablet Take 100 mg by mouth daily       aspirin 81 MG EC tablet Take 81 mg by mouth daily      Multiple Vitamin (MULTI-VITAMIN PO) Take by mouth      VITAMIN D PO Take by mouth      Ascorbic Acid (VITAMIN C PO) Take by mouth      Coenzyme Q10 (COQ10 PO) Take by mouth      gabapentin (NEURONTIN) 400 MG capsule Take 800 mg by mouth 3 times daily.        OLANZapine (ZYPREXA) 20 MG tablet nightly          ALLERGIES    Allergies   Allergen Reactions  Calamine Hives    Diphenhydramine     Diphenhydramine Hcl Hives    Dust Mite Extract Other (See Comments)    Macadamia Nut Oil Other (See Comments)    Pramoxine-Calamine Hives    Seasonal      Other reaction(s): Cough    Erythromycin Palpitations       FAMILY HISTORY    Family History   Problem Relation Age of Onset    Breast Cancer Maternal Grandmother         over 48       SOCIAL HISTORY    Social History     Socioeconomic History    Marital status:      Spouse name: None    Number of children: None    Years of education: None    Highest education level: None   Occupational History    None   Tobacco Use    Smoking status: Former Smoker     Packs/day: 0.50     Types: Cigarettes     Quit date: 10/17/2007     Years since quittin.4    Smokeless tobacco: Never Used   Vaping Use    Vaping Use: Never used   Substance and Sexual Activity    Alcohol use: Yes     Comment: rare    Drug use: No    Sexual activity: Yes     Partners: Male   Other Topics Concern    None   Social History Narrative    None     Social Determinants of Health     Financial Resource Strain:     Difficulty of Paying Living Expenses: Not on file   Food Insecurity:     Worried About Running Out of Food in the Last Year: Not on file    Denilson of Food in the Last Year: Not on file   Transportation Needs:     Lack of Transportation (Medical): Not on file    Lack of Transportation (Non-Medical):  Not on file   Physical Activity:     Days of Exercise per Week: Not on file    Minutes of Exercise per Session: Not on file   Stress:     Feeling of Stress : Not on file   Social Connections:     Frequency of Communication with Friends and Family: Not on file    Frequency of Social Gatherings with Friends and Family: Not on file    Attends Sikhism Services: Not on file    Active Member of Clubs or Organizations: Not on file    Attends Club or Organization Meetings: Not on file    Marital Status: Not on file Intimate Partner Violence:     Fear of Current or Ex-Partner: Not on file    Emotionally Abused: Not on file    Physically Abused: Not on file    Sexually Abused: Not on file   Housing Stability:     Unable to Pay for Housing in the Last Year: Not on file    Number of [place] in the Last Year: Not on file    Unstable Housing in the Last Year: Not on file       REVIEW OF SYSTEMS    Constitutional:  Denies fever, chills, weight loss or weakness   Eyes:  Denies photophobia or discharge   HENT:  Denies sore throat or ear pain positive ear pain  Respiratory:  Denies cough or shortness of breath   Cardiovascular:  Denies chest pain, palpitations or swelling   GI:  Denies abdominal pain, nausea, vomiting, or diarrhea   Musculoskeletal:  Denies back pain positive diffuse muscle pain  Skin:  Denies rash   Neurologic:  Denies headache, focal weakness or sensory changes   Endocrine:  Denies polyuria or polydypsia   Lymphatic:  Denies swollen glands   Psychiatric:  Denies depression, suicidal ideation or homicidal ideation   All systems negative except as marked. PHYSICAL EXAM    VITAL SIGNS: /84   Pulse 96   Temp 98.5 °F (36.9 °C) (Oral)   Resp 16   Ht 5' 2\" (1.575 m)   Wt 192 lb (87.1 kg)   LMP 10/03/2017 Comment: partial hys 1 ovary   SpO2 96%   BMI 35.12 kg/m²    Constitutional:  Well developed, Well nourished, No acute distress, Non-toxic appearance. HENT:  Normocephalic, Atraumatic, Bilateral external ears normal, Oropharynx moist, No oral exudates, Nose normal. Neck- Normal range of motion, No tenderness, Supple, No stridor. Eyes:  PERRL, EOMI, Conjunctiva normal, No discharge. Respiratory:  Normal breath sounds, No respiratory distress, No wheezing, No chest tenderness. Cardiovascular:  Normal heart rate, Normal rhythm, No murmurs, No rubs, No gallops. GI:  Bowel sounds normal, Soft, No tenderness, No masses, No pulsatile masses.    : External genitalia appear normal, No masses or lesions. No discharge. No CVA tenderness. Musculoskeletal:  Intact distal pulses, No edema, No tenderness, No cyanosis, No clubbing. Good range of motion in all major joints. No tenderness to palpation or major deformities noted. Back- No tenderness. Integument:  Warm, Dry, No erythema, No rash. Lymphatic:  No lymphadenopathy noted. Neurologic:  Alert & oriented x 3, Normal motor function, Normal sensory function, No focal deficits noted. Psychiatric:  Affect normal, Judgment normal, Mood normal.     EKG    Sinus  rhythm, normal axis, inferior lateral ST segment flattening, new changes from prior EKG from 2060    RADIOLOGY    Chest x-ray is normal    PROCEDURES    none    ED COURSE & MEDICAL DECISION MAKING    Pertinent Labs & Imaging studies reviewed. (See chart for details)  Patient presents to the ER for evaluation of acute precordial chest pain, initial cardiac enzymes are normal, D-dimer is normal she is a low posttest probability for dissection, her heart score is a 5. She has not had any formal stress test before. Her risk factors include age hyperlipidemia, family history and smoking. She will be admitted for nuclear stress test cardiovascular rule out serial enzymes. Nitroglycerin was administered Valium for anxiolysis and aspirin. Hemodynamically stable. FINAL IMPRESSION    1.  Precordial chest pain             Leni Cano MD  24/57/68 0985

## 2022-03-18 NOTE — H&P
[facility] Medicine History & Physical      PCP: Bruce Tidwell, JASON - CNP    Date of Admission: 3/18/2022    Date of Service: Pt seen/examined on 3/18/2022 and Admitted to observation with expected LOS less than two midnights due to medical therapy. Chief Complaint:  Chest pain    History Of Present Illness:      55 y.o. female, with PMH of HTN ,HLD, obesity, COPD, who was a direct admit from [address] to [facility] with chest pain x 1 day. History obtained from the patient and review of EMR. The patient reports that while driving home from fishing today with her S.O. she had a sudden onset of sharp pain originating in her L ear that radiated around to her posterior neck and down her L arm and into her chest which prompted her to come to the ED for further evaluation. She describes the pain as something\"sitting\" on her chest and is a 12-10 in intensity. In addition she c/o HA and SOB. She does endorse that the pain is reproducible upon movement of her L arm, but denies any known injury. She reports taking a Percocet around 1630 yesterday which helped some, followed by Tylenol ES @ 1800 yesterday which helped with her HA. She reports + life stressors and denies having similar complaints prior to this episode. She does not currently follow with cardiology, but does endorse a + family cardiac history. In the emergency room she had a negative troponin as well as a nonischemic EKG. The patient was ultimately transferred to [facility] for further evaluation and treatment. She denied any other associated symptoms as well as any aggravating and/or alleviating factors. At the time of this assessment, the patient was resting comfortably in bed. She currently denies any chest pain, back pain, abdominal pain, shortness of breath, numbness, tingling, N/V/C/D, fever and/or chills. At this point she will be admitted for stress testing and further evaluation of her symptoms.      Past Medical History:          Diagnosis Date  Asthma     Bipolar disorder     Carpal tunnel syndrome     COPD (chronic obstructive pulmonary disease) (HCC)     Fibromyalgia     GERD (gastroesophageal reflux disease)     Hyperlipidemia     Irritable bowel syndrome     Manic depression (HCC)     PONV (postoperative nausea and vomiting)     PTSD (post-traumatic stress disorder)     Schizophrenia      Past Surgical History:          Procedure Laterality Date    ANKLE FRACTURE SURGERY Left 2/12/2021    LEFT ANKLE OPEN REDUCTION INTERNAL FIXATION   performed by Jose M Lopez DO at [address] [place] [address] Rd      DISCECTOMY L3    BREAST BIOPSY Right     LUMPECTOMY, BENIGN    CARPAL TUNNEL RELEASE Bilateral     MULTIPLE    HIP SURGERY Left     BONE SPUR    KNEE ARTHROCENTESIS Left 10/27/2017    left knee medial compartment arthroplasty    KNEE ARTHROPLASTY Right 12/09/2016    RIGHT PARTIAL KNEE ARTHROPLASTY    KNEE ARTHROPLASTY Right 02/17/2017    right Knee open medial compartment arthroplasty evaluation, irrigation and debridement     KNEE ARTHROSCOPY Left     OVARIAN CYST SURGERY      right     TONSILLECTOMY AND ADENOIDECTOMY      TUBAL LIGATION      R TUBELECTOMY      Medications Prior to Admission:      Prior to Admission medications    Medication Sig Start Date End Date Taking?  Authorizing Provider   PROAIR  (94 Base) MCG/ACT inhaler USE 1-2 PUFFS EVERY 4 HOURS AS NEEDED FORWHEEZING 3/8/22   JASON Romero CNP   ibuprofen (ADVIL;MOTRIN) 800 MG tablet Take 1 tablet by mouth every 8 hours as needed for Pain 2/22/22 3/1/22  Juapnablo Daly DO   pramipexole (MIRAPEX) 0.25 MG tablet TAKE 1 TABLET BY MOUTH 3 TIMES DAILY 1/26/22   JASON Romero CNP   LINZESS 145 MCG capsule TAKE 1 CAPSULE EVERY MORNING (BEFORE BREAKFAST) 1/18/22   JASON Romero CNP   ondansetron (ZOFRAN) 4 MG tablet 1 tablet every 8 hours as needed for nausea and vomiting 1/14/22   Sharen Evan, APRN - CNP Darylene Mutton 25 MG TABS TAKE 2 TABLETS TWICE DAILY 10/20/21   Harielyn Knife, APRN - CNP   Handicap Placard MISC by Does not apply route  21   Harielyn Knife, APRN - CNP   ondansetron (ZOFRAN) 4 MG tablet TAKE 1 TABLET EVERY 8 HOURS AS NEEDED FOR NAUSEA AND VOMITING 21   Harielyn Knife, APRN - CNP   acetaminophen (AMINOFEN) 325 MG tablet Take 2 tablets by mouth every 6 hours as needed for Pain 21   Marcus Johnston, DO   montelukast (SINGULAIR) 10 MG tablet Take 1 tablet by mouth nightly 21   Harielyn Knife, APRN - CNP   meloxicam (MOBIC) 15 MG tablet Take 1 tablet by mouth daily 20   Benjamínn Shannon, APRN - CNP   prazosin (MINIPRESS) 1 MG capsule  10/22/20   Historical Provider, MD   Omega-3 Fatty Acids (FISH OIL PO) Take by mouth    Historical Provider, MD   buPROPion (WELLBUTRIN) 75 MG tablet Take 75 mg by mouth daily  10/22/20   Historical Provider, MD   hydrOXYzine (VISTARIL) 25 MG capsule Take 25 mg by mouth 2 times daily  10/22/20   Historical Provider, MD   lamoTRIgine (LAMICTAL) 100 MG tablet TAKE 1 TABLET BY MOUTH TWICE A DAY 20   Historical Provider, MD   sertraline (ZOLOFT) 50 MG tablet Take 100 mg by mouth daily     Historical Provider, MD   aspirin 81 MG EC tablet Take 81 mg by mouth daily    Historical Provider, MD   Multiple Vitamin (MULTI-VITAMIN PO) Take by mouth    Historical Provider, MD   VITAMIN D PO Take by mouth    Historical Provider, MD   Ascorbic Acid (VITAMIN C PO) Take by mouth    Historical Provider, MD   Coenzyme Q10 (COQ10 PO) Take by mouth    Historical Provider, MD   gabapentin (NEURONTIN) 400 MG capsule Take 800 mg by mouth 3 times daily.   17   Historical Provider, MD   OLANZapine (ZYPREXA) 20 MG tablet nightly  17   Historical Provider, MD     Allergies:  Calamine, Diphenhydramine, Diphenhydramine hcl, Dust mite extract, Macadamia nut oil, Pramoxine-calamine, Seasonal, and Erythromycin    Social History:      The patient currently lives at home    TOBACCO:   reports that she quit smoking about 14 years ago. Her smoking use included cigarettes. She smoked 0.50 packs per day. She has never used smokeless tobacco.  ETOH:   reports current alcohol use. E-Cigarettes/Vaping Use     Questions Responses    E-Cigarette/Vaping Use Never User    Start Date     Passive Exposure     Quit Date     Counseling Given     Comments         Family History:      Reviewed in detail. Positive as follows:        Problem Relation Age of Onset    Breast Cancer Maternal Grandmother         over 48     REVIEW OF SYSTEMS COMPLETED:   Pertinent positives as noted in the HPI. All other systems reviewed and negative. PHYSICAL EXAM PERFORMED:    LMP 10/03/2017 Comment: partial hys 1 ovary     General appearance:  Pleasant, obese female in no apparent distress, appears stated age and cooperative. HEENT:  Pupils equal, round, and reactive to light. Extra ocular muscles intact. Conjunctivae/corneas clear. Neck: Supple, with full range of motion. No jugular venous distention. Trachea midline. Respiratory:  Normal respiratory effort. Clear to auscultation, bilaterally without Rales/Wheezes/Rhonchi. Cardiovascular:  Regular rate and rhythm with normal S1/S2 without murmurs, rubs or gallops. Abdomen: Soft, obese, round  non-tender, non-distended with normal bowel sounds. Musculoskeletal:  No clubbing, cyanosis or edema bilaterally. Full range of motion without deformity. Skin: Skin color, texture, turgor normal.  No significant rashes or lesions. Neurologic:  Neurovascularly intact.  Cranial nerves: II-XII intact, grossly non-focal.  Psychiatric:  Alert and oriented, thought content appropriate, normal insight  Capillary Refill: Brisk,3 seconds, normal  Peripheral Pulses: +2 palpable, equal bilaterally     Labs:     Recent Labs     03/17/22  2146   WBC 13.6*   HGB 13.1   HCT 39.3        Recent Labs     03/17/22  2146      K 3.5      CO2 26   BUN 10 CREATININE 1.0   CALCIUM 9.4     Recent Labs     03/17/22  2146   TROPONINI <0.01     Urinalysis:      Lab Results   Component Value Date    NITRU Negative 11/28/2016    BLOODU Negative 11/28/2016    SPECGRAV >=1.030 11/28/2016    GLUCOSEU Negative 11/28/2016     Radiology:     CXR: I have reviewed the CXR with the following interpretation: No acute cardiopulmonary findings    EKG:  I have reviewed the EKG with the following interpretation: The Ekg interpreted in the absence of a cardiologist shows Sinus rhythm with Fusion complexes. Low voltage QRS. Cannot rule out Anterior infarct , age undetermined. Abnormal ECG. When compared with ECG of 28-NOV-2016 13:54, Fusion complexes are now Present     No orders to display     ASSESSMENT:    Active Hospital Problems    Diagnosis Date Noted    COPD (chronic obstructive pulmonary disease) ([facility] [J44.9]     Obesity [E66.9] 03/30/2016    Chest pain [R07.9] 03/18/2016    Bipolar disorder ([facility] [F31.9] 03/21/2010    Hyperlipidemia [E78.5]      PLAN:    Chest pain in setting of no known CAD  -atypical  -serial troponin - initial negative  -EKG w/o ischemic changes  -ASA and nitro given in ED  -FLP in am  -NPO after MN  -stress in am  -echo in am  -prn nitro  -ASA and statin daily  -tele monitoring  -heart score 4    Obesity  With Body mass index is 35.1 kg/m². Complicating assessment and treatment. Placing patient at risk for multiple co-morbidities as well as early death and contributing to the patient's presentation.  Counseled on weight loss    Essential HTN  -continue home medications    HLD  -continue home medications    COPD, stable  -does not appear to be in acute exacerbation at this time  -continue home inhalers  -oxygen therapy if needed    Bipolar/schizophrenia/PTSD/OCD  -mood appears stable at this time  -continue home medications  -valium given in ED    Fibromyalgia  -continue home medications    Leukocytosis  -likely 2/2 stress response  -no obvious s/s of infection   -cbc in am    DVT Prophylaxis: Lovenox    Diet: No diet orders on file     Code Status: Prior    PT/OT Eval Status: no indication for need at this time    Dispo - 1-2 days pending clinical improvement     JASON Jasso CNP    Thank you JASON Faria CNP for the opportunity to be involved in this patient's care.  If you have any questions or concerns please feel free to contact me at [phone].  -------------------------Anticipated Dr. Edwina rock---------------------------------------

## 2022-03-18 NOTE — PROGRESS NOTES
Patient admitted to room 361 from [facility]  Patient oriented to room, call light, bed rails, phone, lights and bathroom. Patient instructed about the schedule of the day including: vital sign frequency, lab draws, possible tests, frequency of MD and staff rounds, including RN/MD rounding together at bedside, daily weights, and I &O's. Patient instructed about prescribed diet (NPO), how to use 8MENU, and television. bed alarm in place, patient aware of placement and reason. Telemetry box in place, patient aware of placement and reason. Bed locked, in lowest position, side rails up 2/4, call light within reach. Will continue to monitor.

## 2022-03-18 NOTE — CONSULTS
Consult placed    [phone] [facility] cardiology  Date:3/18/2022,  Time:8:18 AM        Electronically signed by Adolfo Warner on 3/18/2022 at 8:18 AM

## 2022-03-18 NOTE — PROGRESS NOTES
03/18/22 0211   RT Protocol   History Pulmonary Disease 2   Respiratory pattern 0   Breath sounds 2   Cough 0   Indications for Bronchodilator Therapy On home bronchodilators;Decreased or absent breath sounds   Bronchodilator Assessment Score 4

## 2022-03-19 ENCOUNTER — APPOINTMENT (OUTPATIENT)
Dept: NUCLEAR MEDICINE | Age: 57
End: 2022-03-19
Attending: INTERNAL MEDICINE
Payer: MEDICARE

## 2022-03-19 VITALS
TEMPERATURE: 98.1 F | DIASTOLIC BLOOD PRESSURE: 90 MMHG | WEIGHT: 199.1 LBS | SYSTOLIC BLOOD PRESSURE: 139 MMHG | HEART RATE: 86 BPM | RESPIRATION RATE: 18 BRPM | HEIGHT: 66 IN | OXYGEN SATURATION: 96 % | BODY MASS INDEX: 32 KG/M2

## 2022-03-19 LAB
EKG ATRIAL RATE: 94 BPM
EKG DIAGNOSIS: NORMAL
EKG P AXIS: 63 DEGREES
EKG P-R INTERVAL: 172 MS
EKG Q-T INTERVAL: 354 MS
EKG QRS DURATION: 96 MS
EKG QTC CALCULATION (BAZETT): 442 MS
EKG R AXIS: -22 DEGREES
EKG T AXIS: 61 DEGREES
EKG VENTRICULAR RATE: 94 BPM
LV EF: 66 %
LVEF MODALITY: NORMAL

## 2022-03-19 PROCEDURE — 99215 OFFICE O/P EST HI 40 MIN: CPT | Performed by: NURSE PRACTITIONER

## 2022-03-19 PROCEDURE — 6370000000 HC RX 637 (ALT 250 FOR IP): Performed by: NURSE PRACTITIONER

## 2022-03-19 PROCEDURE — 93010 ELECTROCARDIOGRAM REPORT: CPT | Performed by: INTERNAL MEDICINE

## 2022-03-19 PROCEDURE — A9502 TC99M TETROFOSMIN: HCPCS | Performed by: INTERNAL MEDICINE

## 2022-03-19 PROCEDURE — 6360000002 HC RX W HCPCS: Performed by: INTERNAL MEDICINE

## 2022-03-19 PROCEDURE — 78452 HT MUSCLE IMAGE SPECT MULT: CPT

## 2022-03-19 PROCEDURE — 2580000003 HC RX 258: Performed by: NURSE PRACTITIONER

## 2022-03-19 PROCEDURE — 3430000000 HC RX DIAGNOSTIC RADIOPHARMACEUTICAL: Performed by: INTERNAL MEDICINE

## 2022-03-19 PROCEDURE — 93005 ELECTROCARDIOGRAM TRACING: CPT | Performed by: NURSE PRACTITIONER

## 2022-03-19 PROCEDURE — 6370000000 HC RX 637 (ALT 250 FOR IP): Performed by: INTERNAL MEDICINE

## 2022-03-19 PROCEDURE — 93017 CV STRESS TEST TRACING ONLY: CPT

## 2022-03-19 PROCEDURE — G0378 HOSPITAL OBSERVATION PER HR: HCPCS

## 2022-03-19 PROCEDURE — 6360000002 HC RX W HCPCS: Performed by: NURSE PRACTITIONER

## 2022-03-19 PROCEDURE — 96375 TX/PRO/DX INJ NEW DRUG ADDON: CPT

## 2022-03-19 PROCEDURE — 96372 THER/PROPH/DIAG INJ SC/IM: CPT

## 2022-03-19 RX ORDER — PANTOPRAZOLE SODIUM 40 MG/1
40 TABLET, DELAYED RELEASE ORAL
Status: DISCONTINUED | OUTPATIENT
Start: 2022-03-20 | End: 2022-03-19 | Stop reason: HOSPADM

## 2022-03-19 RX ORDER — CALCIUM CARBONATE 200(500)MG
500 TABLET,CHEWABLE ORAL 3 TIMES DAILY PRN
Status: DISCONTINUED | OUTPATIENT
Start: 2022-03-19 | End: 2022-03-19 | Stop reason: HOSPADM

## 2022-03-19 RX ADMIN — LAMOTRIGINE 100 MG: 100 TABLET ORAL at 07:25

## 2022-03-19 RX ADMIN — PRAZOSIN HYDROCHLORIDE 1 MG: 1 CAPSULE ORAL at 07:25

## 2022-03-19 RX ADMIN — ASPIRIN 81 MG: 81 TABLET, COATED ORAL at 07:25

## 2022-03-19 RX ADMIN — GABAPENTIN 600 MG: 300 CAPSULE ORAL at 14:17

## 2022-03-19 RX ADMIN — SERTRALINE 100 MG: 50 TABLET, FILM COATED ORAL at 07:25

## 2022-03-19 RX ADMIN — PRAMIPEXOLE DIHYDROCHLORIDE 0.25 MG: 0.25 TABLET ORAL at 07:27

## 2022-03-19 RX ADMIN — BUPROPION HYDROCHLORIDE 75 MG: 75 TABLET, FILM COATED ORAL at 07:26

## 2022-03-19 RX ADMIN — ENOXAPARIN SODIUM 40 MG: 40 INJECTION SUBCUTANEOUS at 07:23

## 2022-03-19 RX ADMIN — HYDROXYZINE PAMOATE 25 MG: 25 CAPSULE ORAL at 07:25

## 2022-03-19 RX ADMIN — TETROFOSMIN 11.7 MILLICURIE: 1.38 INJECTION, POWDER, LYOPHILIZED, FOR SOLUTION INTRAVENOUS at 08:40

## 2022-03-19 RX ADMIN — DICLOFENAC SODIUM 2 G: 10 GEL TOPICAL at 07:26

## 2022-03-19 RX ADMIN — ACETAMINOPHEN 650 MG: 325 TABLET ORAL at 12:30

## 2022-03-19 RX ADMIN — REGADENOSON 0.4 MG: 0.08 INJECTION, SOLUTION INTRAVENOUS at 10:36

## 2022-03-19 RX ADMIN — TETROFOSMIN 31.6 MILLICURIE: 1.38 INJECTION, POWDER, LYOPHILIZED, FOR SOLUTION INTRAVENOUS at 10:37

## 2022-03-19 RX ADMIN — ONDANSETRON 4 MG: 2 INJECTION INTRAMUSCULAR; INTRAVENOUS at 08:30

## 2022-03-19 RX ADMIN — OXYCODONE AND ACETAMINOPHEN 1 TABLET: 5; 325 TABLET ORAL at 07:24

## 2022-03-19 RX ADMIN — PRAMIPEXOLE DIHYDROCHLORIDE 0.25 MG: 0.25 TABLET ORAL at 14:17

## 2022-03-19 RX ADMIN — NITROGLYCERIN 0.5 INCH: 20 OINTMENT TOPICAL at 06:07

## 2022-03-19 RX ADMIN — GABAPENTIN 600 MG: 300 CAPSULE ORAL at 07:25

## 2022-03-19 RX ADMIN — NITROGLYCERIN 0.5 INCH: 20 OINTMENT TOPICAL at 14:18

## 2022-03-19 RX ADMIN — SODIUM CHLORIDE, PRESERVATIVE FREE 10 ML: 5 INJECTION INTRAVENOUS at 07:27

## 2022-03-19 RX ADMIN — MILNACIPRAN HYDROCHLORIDE 25 MG: 12.5 TABLET, FILM COATED ORAL at 07:24

## 2022-03-19 ASSESSMENT — PAIN DESCRIPTION - DESCRIPTORS: DESCRIPTORS: CONSTANT

## 2022-03-19 ASSESSMENT — PAIN DESCRIPTION - ONSET: ONSET: ON-GOING

## 2022-03-19 ASSESSMENT — PAIN DESCRIPTION - LOCATION: LOCATION: CHEST;SHOULDER

## 2022-03-19 ASSESSMENT — PAIN DESCRIPTION - PAIN TYPE: TYPE: ACUTE PAIN

## 2022-03-19 ASSESSMENT — PAIN DESCRIPTION - ORIENTATION: ORIENTATION: LEFT

## 2022-03-19 ASSESSMENT — PAIN SCALES - GENERAL
PAINLEVEL_OUTOF10: 0
PAINLEVEL_OUTOF10: 9
PAINLEVEL_OUTOF10: 2
PAINLEVEL_OUTOF10: 6

## 2022-03-19 ASSESSMENT — PAIN DESCRIPTION - FREQUENCY: FREQUENCY: CONTINUOUS

## 2022-03-19 NOTE — DISCHARGE SUMMARY
Hospital Medicine Discharge Summary    Patient ID: Jose Magaña      Patient's PCP: Nick Velazquez, APRN - CNP    Admit Date: 3/18/2022     Discharge Date:   3/19/2022    Admitting Provider: Lorie Lieberman MD     Discharge Provider: Jory Warner MD     Discharge Diagnoses: Active Hospital Problems    Diagnosis     [facility] [J44.9]     Obesity [E66.9]     Chest pain [R07.9]     Bipolar disorder ([facility] [F31.9]     Hyperlipidemia [E78.5]        The patient was seen and examined on day of discharge and this discharge summary is in conjunction with any daily progress note from day of discharge. Hospital Course:     55 y. o. female, with PMH of HTN ,HLD, obesity, COPD, who was a direct admit from [place]. [address] with chest pain x 1 day. History obtained from the patient and review of EMR. The patient reports that while driving home from fishing today with her S.O. she had a sudden onset of sharp pain originating in her L ear that radiated around to her posterior neck and down her L arm and into her chest which prompted her to come to the ED for further evaluation. She describes the pain as something\"sitting\" on her chest and is a 12-10 in intensity. In addition she c/o HA and SOB. She does endorse that the pain is reproducible upon movement of her L arm, but denies any known injury. She reports taking a Percocet around 1630 yesterday which helped some, followed by Tylenol ES @ 1800 yesterday which helped with her HA. She reports + life stressors and denies having similar complaints prior to this episode. She does not currently follow with cardiology, but does endorse a + family cardiac history. In the emergency room she had a negative troponin as well as a nonischemic EKG. The patient was ultimately transferred to [facility] for further evaluation and treatment.  She denied any other associated symptoms as well as any aggravating and/or alleviating factors.  At the time of this assessment, the patient was resting comfortably in bed. She currently denies any chest pain, back pain, abdominal pain, shortness of breath, numbness, tingling, N/V/C/D, fever and/or chills. At this point she will be admitted for stress testing and further evaluation of her symptoms.     Initially patient had a positive HCG qual. Quantitative level <5. Patient with persistent CP and cardio consulted. Patient had a stress test on 3/19. Results as below - no ischemia. Ok for discharge per cardio. Chest pain may be musculoskeletal.     Stress test:     Summary    *No EKG evidence for ischemia with lexiscan    *Normal LV size and function with EF of 66%    *SPECT imaging with homogeneous tracer distribution with stress and rest        Impression    *Normal LV function    *Normal myocardial perfusion             Physical Exam Performed:     BP (!) 139/90   Pulse 86   Temp 98.1 °F (36.7 °C) (Oral)   Resp 18   Ht 5' 6\" (1.676 m)   Wt 199 lb 1.6 oz (90.3 kg)   LMP 10/03/2017 Comment: partial hys 1 ovary   SpO2 96%   BMI 32.14 kg/m²         General appearance: No apparent distress, appears stated age and cooperative. HEENT: Pupils equal, round, and reactive to light. Conjunctivae/corneas clear. Neck: Supple, with full range of motion. No jugular venous distention. Trachea midline. Respiratory:  Normal respiratory effort. Clear to auscultation, bilaterally without Rales/Wheezes/Rhonchi. Cardiovascular: Regular rate and rhythm with normal S1/S2 without murmurs, rubs or gallops. Tender anterior chest to palpation. Abdomen: Soft, non-tender, non-distended with normal bowel sounds. Musculoskeletal: No clubbing, cyanosis or edema bilaterally. Full range of motion without deformity. Skin: Skin color, texture, turgor normal.  No rashes or lesions. Healed incisions knees bilaterally. Neurologic:  Neurovascularly intact without any focal sensory/motor deficits.  Cranial nerves: II-XII intact, grossly non-focal.  Psychiatric: Alert and oriented, thought content appropriate, normal insight  Capillary Refill: Brisk,3 seconds, normal   Peripheral Pulses: +2 palpable, equal bilaterally       Labs: For convenience and continuity at follow-up the following most recent labs are provided:      CBC:    Lab Results   Component Value Date    WBC 9.5 03/18/2022    HGB 12.7 03/18/2022    HCT 38.0 03/18/2022     03/18/2022       Renal:    Lab Results   Component Value Date     03/18/2022    K 4.1 03/18/2022     03/18/2022    CO2 26 03/18/2022    BUN 14 03/18/2022    CREATININE 0.9 03/18/2022    CALCIUM 9.7 03/18/2022         Significant Diagnostic Studies    Radiology:   CT CARDIAC CALCIUM SCORING   Final Result   Total calcium score of 58 is within the 80th percentile for the patient's age   of 56 y/o . Trace pericardial effusion. Calcium Score Interpretation      0  No identifiable atherosclerotic plaque. Very low cardiovascular disease   risk. Less than 5% chance of presence of coronary artery disease. A   negative examination. 1-10 Minimal plaque burden. Significant coronary artery disease very   unlikely.  Mild plaque burden. Likely mild or minimal coronary stenosis. 101-400 Moderate plaque burden. Moderate non-obstructive coronary artery   disease highly likely. Over 400 Extensive plaque burden. High likelihood of at least one   significant coronary artery stenosis (>50% diameter). CALCIUM SCORING OVERVIEW:      Coronary calcium is a marker for plaque in a blood vessel or atherosclerosis   (hardening of the arteries). The presence and amount of calcium detected in   the coronary artery by the CT scan estimates the presence and amount of   atherosclerotic plaque. These calcium deposits can appear years before the   development of heart disease symptoms such as chest pain and shortness of   breath.       A calcium score is computed for each of the coronary arteries based upon the   volume and density of the calcium deposits. This can be referred to as your   calcified plaque burden. It does not correspond directly to the percentage   of narrowing in the artery, but does correlate with the severity of the   overall coronary atherosclerotic burden. This score is then used to determine the calcium percentile which compares   your calcified plaque burden to that of other asymptomatic men and women of   the same age. The calcium score, in combination with the percentile, enables   your physician to determine your risk of developing symptomatic coronary   artery disease, and to measure the progression of disease as well as the   effectiveness of treatment. A score of zero indicates that there is no calcified plaque burden. This   implies that there is no significant coronary artery narrowing and very low   likelihood of a cardiac event over at least the next 3 years. It does not   absolutely rule out the presence of soft, noncalcified plaque or totally   eliminate the possibility of a cardiac event. A score greater than zero indicates at least some coronary artery disease. As the score increases, so does the likelihood of a significant coronary   narrowing and the likelihood of a coronary event over the next 3 years. Similarly, the likelihood of a coronary event increases with increasing   calcium percentiles.          NM Cardiac Stress Test Nuclear Imaging    (Results Pending)          Consults:     IP CONSULT TO CARDIOLOGY    Disposition:  Home     Condition at Discharge: Stable    Discharge Instructions/Follow-up:     Go to [facility]   If symptoms worsen           Follow up with JASON Jeong CNP in 2 week(s)  Specialty: Nurse Practitioner  Post hospital follow up [address]  [phone]       Code Status:  Full Code     Activity: activity as tolerated    Diet: regular diet      Discharge Medications:     Current Discharge Medication List           Details   diclofenac sodium (VOLTAREN) 1 % GEL Apply 2 g topically 2 times daily  Qty: 60 g, Refills: 1              Details   PROAIR  (90 Base) MCG/ACT inhaler USE 1-2 PUFFS EVERY 4 HOURS AS NEEDED FORWHEEZING  Qty: 8.5 g, Refills: 5      pramipexole (MIRAPEX) 0.25 MG tablet TAKE 1 TABLET BY MOUTH 3 TIMES DAILY  Qty: 90 tablet, Refills: 1      LINZESS 145 MCG capsule TAKE 1 CAPSULE EVERY MORNING (BEFORE BREAKFAST)  Qty: 30 capsule, Refills: 5    Associated Diagnoses: Irritable bowel syndrome with constipation      ondansetron (ZOFRAN) 4 MG tablet 1 tablet every 8 hours as needed for nausea and vomiting  Qty: 30 tablet, Refills: 3    Associated Diagnoses: Nausea and vomiting, intractability of vomiting not specified, unspecified vomiting type      SAVELLA 25 MG TABS TAKE 2 TABLETS TWICE DAILY  Qty: 120 tablet, Refills: 5    Associated Diagnoses: Irritable bowel syndrome with constipation      Handicap Placard MISC by Does not apply route  2022  Qty: 1 each, Refills: 0    Associated Diagnoses: Fibromyalgia      acetaminophen (AMINOFEN) 325 MG tablet Take 2 tablets by mouth every 6 hours as needed for Pain  Qty: 90 tablet, Refills: 0      montelukast (SINGULAIR) 10 MG tablet Take 1 tablet by mouth nightly  Qty: 30 tablet, Refills: 1      prazosin (MINIPRESS) 1 MG capsule       Omega-3 Fatty Acids (FISH OIL PO) Take by mouth      buPROPion (WELLBUTRIN) 75 MG tablet Take 75 mg by mouth daily       hydrOXYzine (VISTARIL) 25 MG capsule Take 25 mg by mouth 2 times daily       lamoTRIgine (LAMICTAL) 100 MG tablet TAKE 1 TABLET BY MOUTH TWICE A DAY      sertraline (ZOLOFT) 50 MG tablet Take 100 mg by mouth daily       aspirin 81 MG EC tablet Take 81 mg by mouth daily      Multiple Vitamin (MULTI-VITAMIN PO) Take by mouth      VITAMIN D PO Take by mouth      Ascorbic Acid (VITAMIN C PO) Take by mouth Coenzyme Q10 (COQ10 PO) Take by mouth      gabapentin (NEURONTIN) 400 MG capsule Take 800 mg by mouth 3 times daily. OLANZapine (ZYPREXA) 20 MG tablet nightly              Time Spent on discharge is more than 45 minutes in the examination, evaluation, counseling and review of medications and discharge plan. Signed:    Tomer Rice MD   3/19/2022      Thank you JASON Everett CNP for the opportunity to be involved in this patient's care. If you have any questions or concerns please feel free to contact me at [phone].

## 2022-03-19 NOTE — PROGRESS NOTES
A Lexiscan Myoview stress test was completed on this patient as ordered. The patient tolerated the procedure well. Awaiting stress imaging at this time.

## 2022-03-19 NOTE — PROGRESS NOTES
[address]   Cardiology Progress Note   Date: 3/19/2022  Admit Date: 3/18/2022     Reason for follow up: CP    Chief Complaint: No chief complaint on file. History of Present Illness: History obtained from patient and medical record. Mary Herrera is a 55 y.o. female with a past medical history of hyperlipidemia, PTSD, schizophrenia, COPD who presented with severe left-sided CP for 2 days. Trop neg. Interval Hx: Today, she is being seen for follow up. Echo revealed normal EF. Stress pending today. Admits to family hx of CAD in mother and father. No DM or nicotine use. Asking for pain and anxiety medication. No new complaints today. No major events overnight. Denies having palpitations, shortness of breath, orthopnea or dizziness. Patient seen and examined. Clinical notes reviewed. Telemetry reviewed. Assessment and Plan:  CP   - Reports constant CP with left arm radiation, improved with oxycodone   - Echo with normal EF   - Stress pending today   - Trop neg  Hyperlipidemia   - Statin  Obesity: Body mass index is 32.14 kg/m². - Excessive weight is complicating assessment treatment. It is placing patient at risk for multiple co-morbidities as well as early death contributing to the patient's presentations. - Discussed weight loss and lifestyle modifications. - If stress is normal then she can go home from CV perspective  - Will follow stress    All pertinent information and plan of care discussed with the rounding/attending cardiologist.    Multiple medical conditions with risk of decompensation. All questions and concerns were addressed to the patient. Alternatives to my treatment were discussed. I have discussed the above stated plan with patient and the nurse. The patient verbalized understanding and agreed with the plan. Thank you for allowing to us to participate in the care of Mary Herrera.     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 2010    Asthma     Irritable bowel syndrome     Hyperlipidemia       Allergies: Allergies   Allergen Reactions    Calamine Hives    Diphenhydramine     Diphenhydramine Hcl Hives    Dust Mite Extract Other (See Comments)    Macadamia Nut Oil Other (See Comments)    Pramoxine-Calamine Hives    Seasonal      Other reaction(s): Cough    Erythromycin Palpitations       Home Meds:  Prior to Visit Medications    Medication Sig Taking?  Authorizing Provider   PROAIR  (44 Base) MCG/ACT inhaler USE 1-2 PUFFS EVERY 4 HOURS AS NEEDED JASON Rand CNP   ibuprofen (ADVIL;MOTRIN) 800 MG tablet Take 1 tablet by mouth every 8 hours as needed for Pain  Bre Garcia DO   pramipexole (MIRAPEX) 0.25 MG tablet TAKE 1 TABLET BY MOUTH 3 TIMES DAILY  JASON Burton CNP   LINZESS 145 MCG capsule TAKE 1 CAPSULE EVERY MORNING (BEFORE BREAKFAST)  JASON Faria CNP   ondansetron (ZOFRAN) 4 MG tablet 1 tablet every 8 hours as needed for nausea and vomiting  JASON Faria CNP   SAVELLA 25 MG TABS TAKE 2 TABLETS TWICE DAILY  JASON Faria CNP   Handicap Placard MISC by Does not apply route  2022  JASON Faria CNP   ondansetron (ZOFRAN) 4 MG tablet TAKE 1 TABLET EVERY 8 HOURS AS NEEDED FOR NAUSEA AND VOMITING  JASON Faria CNP   acetaminophen (AMINOFEN) 325 MG tablet Take 2 tablets by mouth every 6 hours as needed for Pain  Manasa Johnston,    montelukast (SINGULAIR) 10 MG tablet Take 1 tablet by mouth nightly  JASON Horne CNP   meloxicam (MOBIC) 15 MG tablet Take 1 tablet by mouth daily  JASON Horne CNP   prazosin (MINIPRESS) 1 MG capsule   Historical Provider, MD   Omega-3 Fatty Acids (FISH OIL PO) Take by mouth  Historical Provider, MD   buPROPion (WELLBUTRIN) 75 MG tablet Take 75 mg by mouth daily   Historical Provider, MD   hydrOXYzine (VISTARIL) 25 MG capsule Take 25 mg by mouth 2 times daily   Historical Provider, MD   lamoTRIgine (LAMICTAL) 100 MG tablet TAKE 1 TABLET BY MOUTH TWICE A DAY  Historical Provider, MD   sertraline (ZOLOFT) 50 MG tablet Take 100 mg by mouth daily   Historical Provider, MD   aspirin 81 MG EC tablet Take 81 mg by mouth daily  Historical Provider, MD   Multiple Vitamin (MULTI-VITAMIN PO) Take by mouth  Historical Provider, MD   VITAMIN D PO Take by mouth  Historical Provider, MD   Ascorbic Acid (VITAMIN C PO) Take by mouth  Historical Provider, MD   Coenzyme Q10 (COQ10 PO) Take by mouth  Historical Provider, MD   gabapentin (NEURONTIN) 400 MG capsule Take 800 mg by mouth 3 times daily.    Historical Provider, MD   OLANZapine (ZYPREXA) 20 MG tablet nightly   Historical Provider, MD      Scheduled Meds:   aspirin  81 mg Oral Daily    buPROPion  75 mg Oral Daily    gabapentin  600 mg Oral TID    hydrOXYzine  25 mg Oral BID    lamoTRIgine  100 mg Oral BID    linaclotide  145 mcg Oral QAM AC    montelukast  10 mg Oral Nightly    OLANZapine  20 mg Oral Nightly    pramipexole  0.25 mg Oral TID    prazosin  1 mg Oral BID    sertraline  100 mg Oral Daily    milnacipran HCl  25 mg Oral BID    sodium chloride flush  5-40 mL IntraVENous 2 times per day    atorvastatin  40 mg Oral Nightly    enoxaparin  40 mg SubCUTAneous Daily    nitroglycerin  0.5 inch Topical 4 times per day    diclofenac sodium  2 g Topical BID     Continuous Infusions:   sodium chloride       PRN Meds:albuterol sulfate HFA, sodium chloride flush, sodium chloride, ondansetron **OR** ondansetron, acetaminophen **OR** acetaminophen, polyethylene glycol, perflutren lipid microspheres, regadenoson, oxyCODONE-acetaminophen     Past Medical History:  Past Medical History:   Diagnosis Date    Asthma     Bipolar disorder     Carpal tunnel syndrome     COPD (chronic obstructive pulmonary disease) (HCC)     Fibromyalgia     GERD (gastroesophageal reflux disease)     Hyperlipidemia     Irritable bowel syndrome     Manic depression (HCC)     PONV (postoperative nausea and vomiting)     PTSD (post-traumatic stress disorder)     Schizophrenia         Past Surgical History:    has a past surgical history that includes Tubal ligation; Ovarian cyst surgery; Tonsillectomy and adenoidectomy; hip surgery (Left); Carpal tunnel release (Bilateral); back surgery; Knee Arthroplasty (Right, 12/09/2016); Breast biopsy (Right); Knee arthroscopy (Left); Knee Arthroplasty (Right, 02/17/2017); Appendectomy; Knee Arthrocentesis (Left, 10/27/2017); and Ankle fracture surgery (Left, 2/12/2021). Social History:  Reviewed. reports that she quit smoking about 14 years ago. Her smoking use included cigarettes. She smoked 0.50 packs per day. She has never used smokeless tobacco. She reports current alcohol use. She reports that she does not use drugs. Family History:  Reviewed. family history includes Breast Cancer in her maternal grandmother.    Denies family history of sudden cardiac death, arrhythmia, premature CAD    Review of Systems:  · Constitutional: Negative for fever, weight changes, or weakness  · Skin: Negative for bruising, bleeding, blood clots, or changes in skin pigment  · HEENT: Negative for vision changes or dysphagia  · Respiratory: Reviewed in HPI  · Cardiovascular: Reviewed in HPI  · Gastrointestinal: Negative for abdominal pain or black/tarry stools  · Genito-Urinary: Negative for hematuria  · Musculoskeletal: No focal weakness  · Neurological/Psych: Negative for confusion or TIA-like symptoms. Physical Examination:  Vitals:    03/19/22 0730   BP: (!) 139/90   Pulse: 86   Resp: 18   Temp: 98.1 °F (36.7 °C)   SpO2: 96%      In: 480 [P.O.:480]  Out: 400    Wt Readings from Last 3 Encounters:   03/19/22 199 lb 1.6 oz (90.3 kg)   03/17/22 192 lb (87.1 kg)   01/14/22 202 lb 2 oz (91.7 kg)       Intake/Output Summary (Last 24 hours) at 3/19/2022 8492  Last data filed at 3/19/2022 0230  Gross per 24 hour   Intake 480 ml   Output 400 ml   Net 80 ml     Telemetry: Personally Reviewed Normal sinus rhythm/ST  · Constitutional: Cooperative and in no apparent distress, and appears well nourished  · Skin: Warm and pink; no cyanosis, bruising, or clubbing  · HEENT: Symmetric and normocephalic. Conjunctiva pink with clear sclera. Mucus membranes pink and moist.   · Cardiovascular: regular and rhythm. S1 & S2, negative for murmurs. Peripheral pulses 2+, capillary refill < 3 seconds. negative elevation of JVP. · Respiratory: Respirations symmetric and unlabored. Lungs clear to auscultation bilaterally, no wheezing, crackles, or rhonchi  · Gastrointestinal: Abdomen soft and round. Bowel sounds normoactive in all quadrants. · Musculoskeletal: No focal weakness. · Neurologic/Psych: Awake and orientated to person, place and time. Calm affect, appropriate mood    Pertinent labs, diagnostic, device, and imaging results reviewed as a part of this visit    Labs:    BMP:   Recent Labs     03/17/22 2146 03/18/22  0635    141   K 3.5 4.1    107   CO2 26 26   BUN 10 14   CREATININE 1.0 0.9   MG 1.90  --      Estimated Creatinine Clearance: 80 mL/min (based on SCr of 0.9 mg/dL).    CBC:   Recent Labs     03/17/22 2146 03/18/22  0635   WBC 13.6* 9.5   HGB 13.1 12.7   HCT 39.3 38.0   MCV 93.8 94.9    319     Thyroid:   Lab Results   Component Value Date TSH 0.82 08/19/2010     Lipids:   Lab Results   Component Value Date    CHOL 203 03/18/2022    HDL 39 03/18/2022    TRIG 356 03/18/2022     LFTS:   Lab Results   Component Value Date    ALT 23 06/04/2021    AST 18 06/04/2021    ALKPHOS 114 06/04/2021    PROT 7.1 06/04/2021    AGRATIO 1.8 06/04/2021    BILITOT <0.2 06/04/2021     Cardiac Enzymes:   Lab Results   Component Value Date    TROPONINI <0.01 03/18/2022    TROPONINI <0.01 03/18/2022    TROPONINI <0.01 03/17/2022     Coags:   Lab Results   Component Value Date    PROTIME 11.1 02/17/2017    INR 0.98 02/17/2017     ECG: 3/19/2022: SR    ECHO:  3/18/2022   Summary   Normal left ventricular systolic function with ejection fraction of 55-60%. No regional wall motion abnormalites are seen. Normal left ventricular size with mild concentric left ventricular   hypertrophy. Grade I diastolic dysfunction with normal filling pressure. Stress Test: 3/19/2022: Pending    Cath: none    ADELSO Shelton  [address]   Office: [phone]    I have spent 35 minutes of face to face time with the patient with more than 50% spent counseling and coordinating care for Julious Cart.

## 2022-03-21 ENCOUNTER — CARE COORDINATION (OUTPATIENT)
Dept: CASE MANAGEMENT | Age: 57
End: 2022-03-21

## 2022-03-21 DIAGNOSIS — R68.3: Primary | ICD-10-CM

## 2022-03-21 PROCEDURE — 1111F DSCHRG MED/CURRENT MED MERGE: CPT | Performed by: NURSE PRACTITIONER

## 2022-03-21 NOTE — CARE COORDINATION
[address] Transitions Initial Follow Up Call    Call within 2 business days of discharge: Yes    Patient: Jose Magaña Patient : 1965   MRN: [id number]  Reason for Admission: Left shoulder pain  Discharge Date: 3/19/22 RARS: No data recorded    Last Discharge [facility]       Complaint Diagnosis Description Type Department Provider    3/18/22   Admission (Discharged) Yovanny cAuña MD           Spoke with: Jose Magaña  Transitions of Care Initial Call    Was this an external facility discharge? No Discharge Facility: [facility]    Challenges to be reviewed by the provider   Additional needs identified to be addressed with provider: No  none             Method of communication with provider : none      Advance Care Planning:   Does patient have an Advance Directive: Not on file  Was this a readmission? No  Patient stated reason for admission: Left shoulder pain  Patients top risk factors for readmission: ineffective coping  stress    Care Transition Nurse (CTN) contacted the patient by telephone to perform post hospital discharge assessment. Verified name and  with patient as identifiers. Provided introduction to self, and explanation of the CTN role. CTN reviewed discharge instructions, medical action plan and red flags with patient who verbalized understanding. Patient given an opportunity to ask questions and does not have any further questions or concerns at this time. Were discharge instructions available to patient? Yes. Reviewed appropriate site of care based on symptoms and resources available to patient including: PCP, Specialist, Urgent care clinics and When to call 911. The patient agrees to contact the PCP office for questions related to their healthcare. Medication reconciliation was performed with patient, who verbalizes understanding of administration of home medications. Advised obtaining a 90-day supply of all daily and as-needed medications.      Covid Risk Education     Educated patient about risk for severe COVID-19 due to risk factors according to CDC guidelines. LPN CC reviewed discharge instructions, medical action plan and red flag symptoms with the patient who verbalized understanding. Discussed COVID vaccination status: Yes. Education provided on COVID-19 vaccination as appropriate. Discussed exposure protocols and quarantine with CDC Guidelines. Patient was given an opportunity to verbalize any questions and concerns and agrees to contact LPN CC or health care provider for questions related to their healthcare. Reviewed and educated patient on any new and changed medications related to discharge diagnosis. Was patient discharged with a pulse oximeter? No Discussed and confirmed pulse oximeter discharge instructions and when to notify provider or seek emergency care. LPN CC provided contact information. Plan for follow-up call in 5-7 days based on severity of symptoms and risk factors. Plan for next call: follow up appointment-Scheduled? Facility: [facility]    Non-face-to-face services provided:  Obtained and reviewed discharge summary and/or continuity of care documents   This [facility] spoke with pt and pt stated that she is doing well. Patient denied any worsening symptoms. Denied fever, chills, N/V and any difficulty breathing at this time. Denied chest pain and SOB. Denied difficulty with urination, BMs or appetite. Pt stated that she does feel pressure in her chest when she gets stressed out. Writer advised pt to discuss this detail with her PCP at f/u and to minimize stress as best she can. Medication review performed and patient verbalized understanding and is taking all medications as prescribed. Writer will route message to PCP Chirag Hutson to advise of need for f/u. Denied any further needs and concerns at this time. Advised pt to immediately report any worsening symptoms to the PCP. Patient verbalized understanding and agreed.   Shobha Form Ibrahima Greer LPN, [facility]  PH: [phone]        Care Transitions 24 Hour Call    Schedule Follow Up Appointment with PCP: Completed  Do you have any ongoing symptoms?: No  Do you have a copy of your discharge instructions?: Yes  Do you have all of your prescriptions and are they filled?: Yes  Have you been contacted by a [facility] Pharmacist?: No  Have you scheduled your follow up appointment?: No  Were you discharged with any Home Care or Post Acute Services: No  Care Transitions Interventions  No Identified Needs         Follow Up  No future appointments.     Tequila Anders LPN

## 2022-03-28 ENCOUNTER — CARE COORDINATION (OUTPATIENT)
Dept: CASE MANAGEMENT | Age: 57
End: 2022-03-28

## 2022-03-28 NOTE — CARE COORDINATION
[address] Transitions Follow Up Call    3/28/2022    Patient: Modesto Ziegler  Patient : 1965   MRN: [id number]  Reason for Admission: Left shoulder pain   Discharge Date: 3/19/22 RARS: No data recorded       Spoke with: Ena Staton with patient who reported she is doing better just having some shoulder pain still at times. Patient reported she is managing her stress by working and distracting herself. CTN offered to schedule follow up apt and patient agreeable to apt with PCP on . Denies any acute needs at present time. Agreeable to f/u calls. Educated on the use of urgent care or physicians 24 hr access line if assistance is needed after hours. Care Transitions Subsequent and Final Call    Subsequent and Final Calls  Care Transitions Interventions  Other Interventions: Follow Up  No future appointments.     Lilia MARIEN, RN, [facility]  Care Transition Nurse  [phone] mobile

## 2022-04-05 ENCOUNTER — CARE COORDINATION (OUTPATIENT)
Dept: CASE MANAGEMENT | Age: 57
End: 2022-04-05

## 2022-04-05 NOTE — CARE COORDINATION
[address] Transitions Follow Up Call    2022    Patient: Veronica Gaona  Patient : 1965   MRN: [id number]  Reason for Admission: CP OBS   Discharge Date: 3/19/22 RARS: No data recorded       Spoke with: Otto Watson with patient who reported her shoulder pain and cp have improved since she has been avoiding stress. Patient reported she had to cancel her previous PCP apt and will reach out to the PCP herself to setup apt. Patient denied any further needs at this time. Care Transitions Subsequent and Final Call    Subsequent and Final Calls  Care Transitions Interventions  Other Interventions: Follow Up  No future appointments.     Cristiana Rodrigues BSN, RN, [facility]  Care Transition Nurse  [phone] mobile

## 2022-04-12 ENCOUNTER — CARE COORDINATION (OUTPATIENT)
Dept: CASE MANAGEMENT | Age: 57
End: 2022-04-12

## 2022-04-12 NOTE — CARE COORDINATION
[address] Transitions Follow Up Call    2022    Patient: Simon Uriostegui  Patient : 1965   MRN: [id number]  Reason for Admission: CP OBS   Discharge Date: 3/19/22 RARS: No data recorded       Spoke with: Larina Hammans with patient who reported she has been avoiding stress which has helped her chest pain. Patient reported she is also practicing relaxation techniques to help with stress. Patient reported she hasn't been able to get in for an apt with her PCP d/t work and CTN again explained the importance of a follow up with PCP and to reach out to the office to discuss possible VV. Patient denied any further needs at this time. Care Transitions Subsequent and Final Call    Subsequent and Final Calls  Care Transitions Interventions  Other Interventions: Follow Up  No future appointments.     Sol MARIEN, RN, [facility]  Care Transition Nurse  [phone] mobile

## 2022-04-21 RX ORDER — PRAMIPEXOLE DIHYDROCHLORIDE 0.25 MG/1
0.25 TABLET ORAL 3 TIMES DAILY
Qty: 90 TABLET | Refills: 1 | Status: SHIPPED | OUTPATIENT
Start: 2022-04-21 | End: 2022-06-21 | Stop reason: SDUPTHER

## 2022-04-21 NOTE — TELEPHONE ENCOUNTER
Future appt scheduled 0 appt scheduled                         Last appt 01/14/2022      Last Written 01/26/2022    pramipexole (MIRAPEX) 0.25 MG tablet  #90  1 RF

## 2022-05-25 DIAGNOSIS — K58.1 IRRITABLE BOWEL SYNDROME WITH CONSTIPATION: ICD-10-CM

## 2022-05-25 RX ORDER — MILNACIPRAN HYDROCHLORIDE 25 MG/1
TABLET, FILM COATED ORAL
Qty: 120 TABLET | Refills: 5 | OUTPATIENT
Start: 2022-05-25

## 2022-06-21 ENCOUNTER — OFFICE VISIT (OUTPATIENT)
Dept: FAMILY MEDICINE CLINIC | Age: 57
End: 2022-06-21
Payer: MEDICARE

## 2022-06-21 VITALS
SYSTOLIC BLOOD PRESSURE: 135 MMHG | DIASTOLIC BLOOD PRESSURE: 85 MMHG | OXYGEN SATURATION: 97 % | TEMPERATURE: 97.8 F | WEIGHT: 205 LBS | HEART RATE: 71 BPM | BODY MASS INDEX: 33.09 KG/M2

## 2022-06-21 DIAGNOSIS — K58.1 IRRITABLE BOWEL SYNDROME WITH CONSTIPATION: ICD-10-CM

## 2022-06-21 DIAGNOSIS — Z28.21 23-POLYVALENT PNEUMOCOCCAL POLYSACCHARIDE VACCINE DECLINED: ICD-10-CM

## 2022-06-21 DIAGNOSIS — E78.5 HYPERLIPIDEMIA, UNSPECIFIED HYPERLIPIDEMIA TYPE: ICD-10-CM

## 2022-06-21 DIAGNOSIS — K21.9 GASTROESOPHAGEAL REFLUX DISEASE WITHOUT ESOPHAGITIS: Primary | ICD-10-CM

## 2022-06-21 DIAGNOSIS — F31.9 BIPOLAR AFFECTIVE DISORDER, REMISSION STATUS UNSPECIFIED (HCC): ICD-10-CM

## 2022-06-21 DIAGNOSIS — R73.9 HYPERGLYCEMIA: ICD-10-CM

## 2022-06-21 DIAGNOSIS — J44.9 CHRONIC OBSTRUCTIVE PULMONARY DISEASE, UNSPECIFIED COPD TYPE (HCC): ICD-10-CM

## 2022-06-21 DIAGNOSIS — Z11.59 ENCOUNTER FOR HEPATITIS C SCREENING TEST FOR LOW RISK PATIENT: ICD-10-CM

## 2022-06-21 DIAGNOSIS — Z13.1 DIABETES MELLITUS SCREENING: ICD-10-CM

## 2022-06-21 DIAGNOSIS — Z12.11 COLON CANCER SCREENING: ICD-10-CM

## 2022-06-21 DIAGNOSIS — F42.8 OTHER OBSESSIVE-COMPULSIVE DISORDERS: ICD-10-CM

## 2022-06-21 DIAGNOSIS — M25.572 LEFT ANKLE PAIN, UNSPECIFIED CHRONICITY: ICD-10-CM

## 2022-06-21 DIAGNOSIS — F20.9 SCHIZOPHRENIA, UNSPECIFIED TYPE (HCC): ICD-10-CM

## 2022-06-21 PROCEDURE — 3023F SPIROM DOC REV: CPT | Performed by: NURSE PRACTITIONER

## 2022-06-21 PROCEDURE — G8427 DOCREV CUR MEDS BY ELIG CLIN: HCPCS | Performed by: NURSE PRACTITIONER

## 2022-06-21 PROCEDURE — 99214 OFFICE O/P EST MOD 30 MIN: CPT | Performed by: NURSE PRACTITIONER

## 2022-06-21 PROCEDURE — 1036F TOBACCO NON-USER: CPT | Performed by: NURSE PRACTITIONER

## 2022-06-21 PROCEDURE — G8417 CALC BMI ABV UP PARAM F/U: HCPCS | Performed by: NURSE PRACTITIONER

## 2022-06-21 PROCEDURE — 3017F COLORECTAL CA SCREEN DOC REV: CPT | Performed by: NURSE PRACTITIONER

## 2022-06-21 RX ORDER — PRAMIPEXOLE DIHYDROCHLORIDE 0.25 MG/1
0.25 TABLET ORAL 3 TIMES DAILY
Qty: 90 TABLET | Refills: 1 | Status: SHIPPED | OUTPATIENT
Start: 2022-06-21 | End: 2022-11-03

## 2022-06-21 ASSESSMENT — PATIENT HEALTH QUESTIONNAIRE - PHQ9
6. FEELING BAD ABOUT YOURSELF - OR THAT YOU ARE A FAILURE OR HAVE LET YOURSELF OR YOUR FAMILY DOWN: 0
5. POOR APPETITE OR OVEREATING: 0
2. FEELING DOWN, DEPRESSED OR HOPELESS: 0
8. MOVING OR SPEAKING SO SLOWLY THAT OTHER PEOPLE COULD HAVE NOTICED. OR THE OPPOSITE, BEING SO FIGETY OR RESTLESS THAT YOU HAVE BEEN MOVING AROUND A LOT MORE THAN USUAL: 3
SUM OF ALL RESPONSES TO PHQ QUESTIONS 1-9: 4
4. FEELING TIRED OR HAVING LITTLE ENERGY: 0
SUM OF ALL RESPONSES TO PHQ9 QUESTIONS 1 & 2: 0
1. LITTLE INTEREST OR PLEASURE IN DOING THINGS: 0
10. IF YOU CHECKED OFF ANY PROBLEMS, HOW DIFFICULT HAVE THESE PROBLEMS MADE IT FOR YOU TO DO YOUR WORK, TAKE CARE OF THINGS AT HOME, OR GET ALONG WITH OTHER PEOPLE: 0
9. THOUGHTS THAT YOU WOULD BE BETTER OFF DEAD, OR OF HURTING YOURSELF: 0
SUM OF ALL RESPONSES TO PHQ QUESTIONS 1-9: 4
7. TROUBLE CONCENTRATING ON THINGS, SUCH AS READING THE NEWSPAPER OR WATCHING TELEVISION: 1
3. TROUBLE FALLING OR STAYING ASLEEP: 0

## 2022-06-21 ASSESSMENT — ENCOUNTER SYMPTOMS
ABDOMINAL PAIN: 0
NAUSEA: 0
COUGH: 1
SHORTNESS OF BREATH: 0
VOMITING: 0
SORE THROAT: 0
WHEEZING: 0
RHINORRHEA: 0
DIARRHEA: 0

## 2022-06-21 NOTE — PROGRESS NOTES
CHIEF COMPLAINT  Chief Complaint   Patient presents with   Kaleigh Souza     GERD        HPI   Uziel Mejia is a 55 y.o. female who presents to the office for checkup. Patient denies any recent changes in medications or recent illness. Patient reports chronic cough and congestion over the past several months. Patient reports symptoms are worse when she wakes up in the morning. Patient reports pain and swelling in her left ankle intermittently. Patient continues to follow-up with [address] [place] [address] on a regular basis. Patient denies any new unusual fatigue or unexplained weight loss. No chest pain or shortness of breath. No abdominal pain or discomfort. No nausea, vomiting, diarrhea. No dark or tarry stools. Patient takes medications as prescribed. No other complaints, modifying factors or associated symptoms. Nursing notes reviewed.    Past Medical History:   Diagnosis Date    Asthma     Bipolar disorder     Carpal tunnel syndrome     COPD (chronic obstructive pulmonary disease) (HCC)     Fibromyalgia     GERD (gastroesophageal reflux disease)     Hyperlipidemia     Irritable bowel syndrome     Manic depression (HCC)     PONV (postoperative nausea and vomiting)     PTSD (post-traumatic stress disorder)     Schizophrenia      Past Surgical History:   Procedure Laterality Date    ANKLE FRACTURE SURGERY Left 2/12/2021    LEFT ANKLE OPEN REDUCTION INTERNAL FIXATION   performed by Eduin Mason DO at [address] [place] [address] Rd      DISCECTOMY L3    BREAST BIOPSY Right     LUMPECTOMY, BENIGN    CARPAL TUNNEL RELEASE Bilateral     MULTIPLE    HIP SURGERY Left     BONE SPUR    KNEE ARTHROCENTESIS Left 10/27/2017    left knee medial compartment arthroplasty    KNEE ARTHROPLASTY Right 12/09/2016    RIGHT PARTIAL KNEE ARTHROPLASTY    KNEE ARTHROPLASTY Right 02/17/2017    right Knee open medial compartment arthroplasty evaluation, irrigation and debridement     KNEE ARTHROSCOPY Left     OVARIAN CYST SURGERY      right     TONSILLECTOMY AND ADENOIDECTOMY      TUBAL LIGATION      R TUBELECTOMY      Family History   Problem Relation Age of Onset    Breast Cancer Maternal Grandmother         over 48     Social History     Socioeconomic History    Marital status:      Spouse name: Not on file    Number of children: Not on file    Years of education: Not on file    Highest education level: Not on file   Occupational History    Not on file   Tobacco Use    Smoking status: Former Smoker     Packs/day: 0.50     Types: Cigarettes     Quit date: 10/17/2007     Years since quittin.6    Smokeless tobacco: Never Used   Vaping Use    Vaping Use: Never used   Substance and Sexual Activity    Alcohol use: Yes     Comment: rare    Drug use: No    Sexual activity: Yes     Partners: Male   Other Topics Concern    Not on file   Social History Narrative    Not on file     Social Determinants of Health     Financial Resource Strain:     Difficulty of Paying Living Expenses: Not on file   Food Insecurity:     Worried About Running Out of Food in the Last Year: Not on file    Denilson of Food in the Last Year: Not on file   Transportation Needs:     Lack of Transportation (Medical): Not on file    Lack of Transportation (Non-Medical):  Not on file   Physical Activity:     Days of Exercise per Week: Not on file    Minutes of Exercise per Session: Not on file   Stress:     Feeling of Stress : Not on file   Social Connections:     Frequency of Communication with Friends and Family: Not on file    Frequency of Social Gatherings with Friends and Family: Not on file    Attends Zoroastrian Services: Not on file    Active Member of Clubs or Organizations: Not on file    Attends Club or Organization Meetings: Not on file    Marital Status: Not on file   Intimate Partner Violence:     Fear of Current or Ex-Partner: Not on file    Emotionally Abused: Not on file   Nahed Franks Physically Abused: Not on file    Sexually Abused: Not on file   Housing Stability:     Unable to Pay for Housing in the Last Year: Not on file    Number of Places Lived in the Last Year: Not on file    Unstable Housing in the Last Year: Not on file     Current Outpatient Medications   Medication Sig Dispense Refill    pramipexole (MIRAPEX) 0.25 MG tablet Take 1 tablet by mouth 3 times daily 90 tablet 1    VITAMIN E PO Take by mouth      Multiple Vitamins-Minerals (HAIR SKIN NAILS PO) Take by mouth      PROAIR  (90 Base) MCG/ACT inhaler USE 1-2 PUFFS EVERY 4 HOURS AS NEEDED FORWHEEZING 8.5 g 5    LINZESS 145 MCG capsule TAKE 1 CAPSULE EVERY MORNING (BEFORE BREAKFAST) 30 capsule 5    ondansetron (ZOFRAN) 4 MG tablet 1 tablet every 8 hours as needed for nausea and vomiting 30 tablet 3    SAVELLA 25 MG TABS TAKE 2 TABLETS TWICE DAILY 120 tablet 5    Handicap Placard MISC by Does not apply route  2022 1 each 0    acetaminophen (AMINOFEN) 325 MG tablet Take 2 tablets by mouth every 6 hours as needed for Pain 90 tablet 0    prazosin (MINIPRESS) 1 MG capsule       Omega-3 Fatty Acids (FISH OIL PO) Take by mouth      buPROPion (WELLBUTRIN) 75 MG tablet Take 75 mg by mouth daily       lamoTRIgine (LAMICTAL) 100 MG tablet TAKE 1 TABLET BY MOUTH TWICE A DAY      sertraline (ZOLOFT) 50 MG tablet Take 100 mg by mouth daily       Multiple Vitamin (MULTI-VITAMIN PO) Take by mouth      VITAMIN D PO Take by mouth      Ascorbic Acid (VITAMIN C PO) Take by mouth      Coenzyme Q10 (COQ10 PO) Take by mouth      gabapentin (NEURONTIN) 400 MG capsule Take 800 mg by mouth 3 times daily.  OLANZapine (ZYPREXA) 20 MG tablet nightly        No current facility-administered medications for this visit.      Allergies   Allergen Reactions    Calamine Hives    Diphenhydramine     Diphenhydramine Hcl Hives    Dust Mite Extract Other (See Comments)    Macadamia Nut Oil Other (See Comments)    Pramoxine-Calamine Hives    Seasonal      Other reaction(s): Cough    Erythromycin Palpitations       REVIEW OF SYSTEMS  Review of Systems   Constitutional: Negative for activity change, appetite change, chills and fever. HENT: Positive for congestion. Negative for rhinorrhea and sore throat. Eyes: Negative for visual disturbance. Respiratory: Positive for cough. Negative for shortness of breath and wheezing. Cardiovascular: Positive for leg swelling. Negative for chest pain. Gastrointestinal: Negative for abdominal pain, diarrhea, nausea and vomiting. Genitourinary: Negative for dysuria, frequency, hematuria and urgency. Musculoskeletal: Positive for arthralgias. Negative for gait problem and myalgias. Left ankle pain   Skin: Negative for rash. Neurological: Negative for dizziness, weakness, light-headedness, numbness and headaches. Psychiatric/Behavioral: Negative for self-injury, sleep disturbance and suicidal ideas. The patient is not nervous/anxious. PHYSICAL EXAM  /85   Pulse 71   Temp 97.8 °F (36.6 °C) (Oral)   Wt 205 lb (93 kg)   LMP 10/03/2017 Comment: partial hys 1 ovary   SpO2 97%   BMI 33.09 kg/m²   Physical Exam  Vitals reviewed. Constitutional:       General: She is not in acute distress. Appearance: Normal appearance. She is well-developed. She is not diaphoretic. HENT:      Head: Normocephalic and atraumatic. Right Ear: Tympanic membrane normal.      Left Ear: Tympanic membrane normal.      Nose: Nose normal.      Mouth/Throat:      Mouth: Mucous membranes are moist.      Pharynx: Oropharynx is clear. No oropharyngeal exudate or posterior oropharyngeal erythema. Eyes:      General:         Right eye: No discharge. Left eye: No discharge. Pupils: Pupils are equal, round, and reactive to light. Neck:      Vascular: No JVD. Cardiovascular:      Rate and Rhythm: Normal rate and regular rhythm. Pulses: Normal pulses. Pulmonary:      Effort: Pulmonary effort is normal. No respiratory distress. Breath sounds: No stridor. No wheezing, rhonchi or rales. Chest:      Chest wall: No tenderness. Abdominal:      General: Bowel sounds are normal. There is no distension. Palpations: Abdomen is soft. Tenderness: There is no abdominal tenderness. There is no guarding. Musculoskeletal:         General: No swelling or deformity. Normal range of motion. Cervical back: Normal range of motion and neck supple. Skin:     General: Skin is warm and dry. Capillary Refill: Capillary refill takes less than 2 seconds. Findings: No rash. Neurological:      General: No focal deficit present. Mental Status: She is alert and oriented to person, place, and time. Psychiatric:         Attention and Perception: She is inattentive. Mood and Affect: Mood is anxious. Speech: Speech is rapid and pressured. Behavior: Behavior is cooperative. Thought Content: Thought content normal.        ASSESSMENT/PLAN:   1. Gastroesophageal reflux disease without esophagitis  Stable. Asymptomatic. No recent exacerbations. Continue with current treatment management avoid food that may trigger symptoms. Follow-up in 6 months, sooner for new or worsening symptoms.  - Comprehensive Metabolic Panel; Future    2. Sch[facility], unspecified type ([facility]  Stable. Managed by [employer] Patient reports seeing counselor and psychiatrist every 3 months. Patient denies any recent changes in medications. Patient continues to take bupropion, Lamictal, sertraline, Mirapex Zyprexa, Neurontin. Continue with current treatment management follow-up in 6 months, sooner for new or worsening symptoms. 3. Chronic obstructive pulmonary disease, unspecified COPD type ([address]  Stable. No recent exacerbations. Patient reports that she does have cough and congestion worse in the mornings upon awakening.   Patient reports use of ProAir inhaler. Patient reports former use of tobacco products but is exposed to secondhand smoke per her . Patient denies any worsening shortness of breath. Patient encouraged to avoid excessive exposure to secondhand smoke continue current management follow-up in 6 months, sooner for new or worsening symptoms. 4. Irritable bowel syndrome with constipation  Stable. Patient compliant with Linzess. No new or worsening symptoms. Continue with current treatment management follow-up in 6 months, sooner for new or worsening symptoms. 5. Bipolar affective disorder, remission status unspecified ([facility]  See above #2    6. Hyperlipidemia, unspecified hyperlipidemia type  Stable. Patient compliant with omega-3 fish oil. Patient counseled the importance of diet and exercise. Follow-up in 6 months, sooner for new or worsening symptoms. 7. Other obsessive-compulsive disorders  See above #2    8. Encounter for hepatitis C screening test for low risk patient  Preventative screening recommended. Labs ordered and pending. We will follow-up with results. - Hepatitis Panel, Acute; Future    9. Colon cancer screening  Preventative screening recommended. Patient given FIT kit at appointment instructions on use. We will follow-up with results. - POCT Fecal Immunochemical Test (FIT); Future    10. Diabetes mellitus screening  Stable. Previous A1c 6.3. Patient counseled the importance of diet, exercise and weight loss. Continue current treatment management follow-up with results. - Hemoglobin A1C; Future    11. Hyperglycemia  See above #10  - Hemoglobin A1C; Future    12. 23-polyvalent pneumococcal polysaccharide vaccine declined  Preventative vaccination recommended however patient declined. 13. Left ankle pain, unspecified chronicity  Patient presents with complaints of intermittent left ankle pain and swelling.   Patient has history of traumatic injury to left ankle with surgical repair. Patient has not followed up with orthopedics recently due to increase in pain and swelling. Patient is able to ambulate but reports that pain does make it difficult at times. Patient is prescribed gabapentin for bipolar but reports it does help with her pain as well. I did recommend the patient follow-up with orthopedic surgeon for further evaluation. Patient verbalized and acknowledges with plan of care at this time. Patient aware that she is past due for her Pap smear/cervical screening. Patient reports that she has seen gynecologist Dr. Sandra Barillas in the past therefore I did recommend her calling and scheduling an appointment. The note was completed using Dragon voice recognition transcription. Every effort was made to ensure accuracy; however, inadvertent  transcription errors may be present despite my best efforts to edit errors.     Matthew Guillory, JASON - CNP

## 2022-06-21 NOTE — PATIENT INSTRUCTIONS
Please read the healthy family handout that you were given and share it with your family. Please compare this printed medication list with your medications at home to be sure they are the same. If you have any medications that are different please contact us immediately at [phone]. Also review your allergies that we have listed, these may also include medications that you have not been able to tolerate, make sure everything listed is correct. If you have any allergies that are different please contact us immediately at [phone].
Home

## 2022-06-30 DIAGNOSIS — K58.1 IRRITABLE BOWEL SYNDROME WITH CONSTIPATION: ICD-10-CM

## 2022-06-30 RX ORDER — MILNACIPRAN HYDROCHLORIDE 25 MG/1
TABLET, FILM COATED ORAL
Qty: 120 TABLET | Refills: 5 | Status: SHIPPED | OUTPATIENT
Start: 2022-06-30

## 2022-06-30 NOTE — TELEPHONE ENCOUNTER
Future appt scheduled 07/06/2022 (lab)               0 OV scheduled-Return in about 6 months (around 12/21/2022),                        Last appt 06/21/2022        Last Written 10/20/2021    SAVELLA 25 MG TABS  #120  5 RF

## 2022-07-12 DIAGNOSIS — R11.2 NAUSEA AND VOMITING, INTRACTABILITY OF VOMITING NOT SPECIFIED, UNSPECIFIED VOMITING TYPE: ICD-10-CM

## 2022-07-12 RX ORDER — ONDANSETRON 4 MG/1
TABLET, FILM COATED ORAL
Qty: 30 TABLET | Refills: 3 | Status: SHIPPED | OUTPATIENT
Start: 2022-07-12

## 2022-07-22 ENCOUNTER — HOSPITAL ENCOUNTER (EMERGENCY)
Age: 57
Discharge: HOME | End: 2022-07-22
Attending: EMERGENCY MEDICINE
Payer: MEDICARE

## 2022-07-22 ENCOUNTER — APPOINTMENT (OUTPATIENT)
Dept: GENERAL RADIOLOGY | Age: 57
End: 2022-07-22
Payer: MEDICARE

## 2022-07-22 VITALS
RESPIRATION RATE: 18 BRPM | WEIGHT: 200 LBS | OXYGEN SATURATION: 99 % | SYSTOLIC BLOOD PRESSURE: 135 MMHG | DIASTOLIC BLOOD PRESSURE: 90 MMHG | HEART RATE: 98 BPM | TEMPERATURE: 98.2 F | HEIGHT: 62 IN | BODY MASS INDEX: 36.8 KG/M2

## 2022-07-22 DIAGNOSIS — S50.11XA CONTUSION OF RIGHT FOREARM, INITIAL ENCOUNTER: Primary | ICD-10-CM

## 2022-07-22 PROCEDURE — 73090 X-RAY EXAM OF FOREARM: CPT

## 2022-07-22 PROCEDURE — 6370000000 HC RX 637 (ALT 250 FOR IP): Performed by: EMERGENCY MEDICINE

## 2022-07-22 PROCEDURE — 99283 EMERGENCY DEPT VISIT LOW MDM: CPT

## 2022-07-22 RX ORDER — IBUPROFEN 400 MG/1
400 TABLET ORAL ONCE
Status: COMPLETED | OUTPATIENT
Start: 2022-07-22 | End: 2022-07-22

## 2022-07-22 RX ADMIN — IBUPROFEN 400 MG: 400 TABLET, FILM COATED ORAL at 15:58

## 2022-07-22 ASSESSMENT — PAIN DESCRIPTION - LOCATION
LOCATION: WRIST;ARM
LOCATION: WRIST

## 2022-07-22 ASSESSMENT — PAIN DESCRIPTION - ORIENTATION
ORIENTATION: RIGHT
ORIENTATION: RIGHT

## 2022-07-22 ASSESSMENT — PAIN DESCRIPTION - PAIN TYPE: TYPE: ACUTE PAIN

## 2022-07-22 ASSESSMENT — PAIN DESCRIPTION - FREQUENCY: FREQUENCY: CONTINUOUS

## 2022-07-22 ASSESSMENT — PAIN DESCRIPTION - DESCRIPTORS: DESCRIPTORS: ACHING;STABBING

## 2022-07-22 ASSESSMENT — PAIN SCALES - GENERAL: PAINLEVEL_OUTOF10: 5

## 2022-07-22 ASSESSMENT — PAIN - FUNCTIONAL ASSESSMENT: PAIN_FUNCTIONAL_ASSESSMENT: 0-10

## 2022-07-22 NOTE — ED PROVIDER NOTES
[address] [facility]. [facility] Emergency Department      CHIEF COMPLAINT  Arm Pain (Right wrist, and lower arm pain from fall on 7/19. Pt reports falling and catching herself with hands. Continued pain since fall.)      HISTORY OF PRESENT ILLNESS  Latoya Hernandes is a 55 y.o. female with a history of COPD and fibromyalgia presents with right wrist and forearm pain. She states that she fell on the 19th and caught her self with her arm. She did not hit her head. No neck or back pain. She has had pain in that arm since the fall. She denies any numbness or weakness of the arm. No other complaints, modifying factors or associated symptoms. I have reviewed the following from the nursing documentation.     Past Medical History:   Diagnosis Date    Asthma     Bipolar disorder     Carpal tunnel syndrome     COPD (chronic obstructive pulmonary disease) (HCC)     Fibromyalgia     GERD (gastroesophageal reflux disease)     Hyperlipidemia     Irritable bowel syndrome     Manic depression (HCC)     PONV (postoperative nausea and vomiting)     PTSD (post-traumatic stress disorder)     Schizophrenia      Past Surgical History:   Procedure Laterality Date    ANKLE FRACTURE SURGERY Left 2/12/2021    LEFT ANKLE OPEN REDUCTION INTERNAL FIXATION   performed by Shawn Zuniga DO at [address]      DISCECTOMY L3    BREAST BIOPSY Right     LUMPECTOMY, BENIGN    CARPAL TUNNEL RELEASE Bilateral     MULTIPLE    HIP SURGERY Left     BONE SPUR    KNEE ARTHROCENTESIS Left 10/27/2017    left knee medial compartment arthroplasty    KNEE ARTHROPLASTY Right 12/09/2016    RIGHT PARTIAL KNEE ARTHROPLASTY    KNEE ARTHROPLASTY Right 02/17/2017    right Knee open medial compartment arthroplasty evaluation, irrigation and debridement     KNEE ARTHROSCOPY Left     OVARIAN CYST SURGERY      right     TONSILLECTOMY AND ADENOIDECTOMY      TUBAL LIGATION      R TUBELECTOMY      Family History   Problem Relation Age of Onset    Breast Cancer Maternal Grandmother         over 48     Social History     Socioeconomic History    Marital status:      Spouse name: Not on file    Number of children: Not on file    Years of education: Not on file    Highest education level: Not on file   Occupational History    Not on file   Tobacco Use    Smoking status: Former     Packs/day: 0.50     Types: Cigarettes     Quit date: 10/17/2007     Years since quittin.7    Smokeless tobacco: Never   Vaping Use    Vaping Use: Never used   Substance and Sexual Activity    Alcohol use: Yes     Comment: rare    Drug use: No    Sexual activity: Yes     Partners: Male   Other Topics Concern    Not on file   Social History Narrative    Not on file     Social Determinants of Health     Financial Resource Strain: Not on file   Food Insecurity: Not on file   Transportation Needs: Not on file   Physical Activity: Not on file   Stress: Not on file   Social Connections: Not on file   Intimate Partner Violence: Not on file   Housing Stability: Not on file     No current facility-administered medications for this encounter.      Current Outpatient Medications   Medication Sig Dispense Refill    ondansetron (ZOFRAN) 4 MG tablet TAKE 1 TAB BY MOUTH EVERY 8 HOURS AS NEEDED FOR NAUSEA AND VOMITING 30 tablet 3    SAVELLA 25 MG TABS TAKE 2 TABLETS TWICE DAILY 120 tablet 5    pramipexole (MIRAPEX) 0.25 MG tablet Take 1 tablet by mouth 3 times daily 90 tablet 1    VITAMIN E PO Take by mouth      Multiple Vitamins-Minerals (HAIR SKIN NAILS PO) Take by mouth      PROAIR  (90 Base) MCG/ACT inhaler USE 1-2 PUFFS EVERY 4 HOURS AS NEEDED FORWHEEZING 8.5 g 5    LINZESS 145 MCG capsule TAKE 1 CAPSULE EVERY MORNING (BEFORE BREAKFAST) 30 capsule 5    Handicap Placard MISC by Does not apply route  2022 1 each 0    acetaminophen (AMINOFEN) 325 MG tablet Take 2 tablets by mouth every 6 hours as needed for Pain 90 tablet 0    prazosin (MINIPRESS) 1 MG capsule       Omega-3 Fatty Acids (FISH OIL PO) Take by mouth      buPROPion (WELLBUTRIN) 75 MG tablet Take 75 mg by mouth daily       lamoTRIgine (LAMICTAL) 100 MG tablet TAKE 1 TABLET BY MOUTH TWICE A DAY      sertraline (ZOLOFT) 50 MG tablet Take 100 mg by mouth daily       Multiple Vitamin (MULTI-VITAMIN PO) Take by mouth      VITAMIN D PO Take by mouth      Ascorbic Acid (VITAMIN C PO) Take by mouth      Coenzyme Q10 (COQ10 PO) Take by mouth      gabapentin (NEURONTIN) 400 MG capsule Take 800 mg by mouth 3 times daily. OLANZapine (ZYPREXA) 20 MG tablet nightly        Allergies   Allergen Reactions    Calamine Hives    Diphenhydramine Hcl Hives    Dust Mite Extract Other (See Comments)    Macadamia Nut Oil Other (See Comments)    Seasonal      Other reaction(s): Cough    Erythromycin Palpitations       REVIEW OF SYSTEMS      General:  No fevers  Eyes:  No recent vison changes  ENT:  No sore throat, no nasal congestion  Cardiovascular:  no palpitations  Respiratory:   no cough, no wheezing  Gastrointestinal:  No abdominal pain, no vomiting, no diarrhea  Musculoskeletal: Right wrist and forearm pain. Skin:  No rash   Neurologic:  No speech problems, no headache, no extremity numbness, no extremity weakness  Genitourinary:  No dysuria  Extremities:  no edema, no pain      Unless otherwise stated in this report, this patient's positive and negative responses for review of systems (constitutional, eyes, ENT, cardiovascular, respiratory, gastrointestinal, neurological, genitourinary, musculoskeletal, integument systems and systems related to the presenting problem) are either stated in the preceding paragraph, were not pertinent or were negative for the symptoms and/or complaints related to the medical problem.     PHYSICAL EXAM  BP (!) 135/90   Pulse 98   Temp 98.2 °F (36.8 °C) (Oral)   Resp 18   Ht 5' 2\" (1.575 m)   Wt 200 lb (90.7 kg)   LMP 10/03/2017 Comment: partial hys 1 ovary   SpO2 99%   BMI 36.58 kg/m²   GENERAL APPEARANCE: Awake and alert. Cooperative. No acute distress. HEAD: Normocephalic. Atraumatic. EYES: PERRL. EOM's grossly intact. ENT: Mucous membranes are moist.   NECK: Supple, trachea midline. HEART: RRR. LUNGS: Respirations unlabored. CTAB. Good air exchange. No wheezes, rales, or rhonchi. Speaking comfortably in full sentences. ABDOMEN: Soft. Non-distended. Non-tender. No guarding or rebound. EXTREMITIES: No peripheral edema. Right distal forearm is tender with mild swelling noted. It is neurovascularly intact. No deformity. SKIN: Warm, dry and intact. No acute rashes. NEUROLOGICAL: Alert and oriented X 3. CN II-XII grossly intact. Strength 5/5, sensation intact. PSYCHIATRIC: Normal mood and affect. LABS  I have reviewed all labs for this visit. No results found for this visit on 07/22/22. RADIOLOGY  X-RAYS: ALL IMAGES INCLUDING PLAIN FILMS, CT, ULTRASOUND AND MRI HAVE BEEN READ BY THE RADIOLOGIST. I have personally reviewed plain film images and have reviewed the radiology reports. XR RADIUS ULNA RIGHT (2 VIEWS)   Final Result   No acute bony or joint abnormality                    Rechecks: Physical assessment performed. Patient is been updated on her x-ray findings. Supportive care recommended at home. Sepsis:  Is this patient to be included in the SEP-1 Core Measure due to severe sepsis or septic shock? No   Exclusion criteria - the patient is NOT to be included for SEP-1 Core Measure due to: Infection is not suspected         ED COURSE/MDM  Patient seen and evaluated. Here the patient is afebrile with normal vitals signs. Old records reviewed. Here the patient has right wrist and forearm pain after a fall. The arm is neurovascularly intact. No deformity noted. X-rays negative for fracture. I will recommend supportive care at home. He was provided for comfort. Labs and imaging reviewed and results discussed with patient.  Patient was reassessed as noted above . Plan of care discussed with patient. Patient in agreement with plan. Strict return precautions have been given. Patient was given scripts for the following medications. I counseled patient how to take these medications. New Prescriptions    No medications on file     No prescriptions given. CLINICAL IMPRESSION  No diagnosis found. Blood pressure (!) 135/90, pulse 98, temperature 98.2 °F (36.8 °C), temperature source Oral, resp. rate 18, height 5' 2\" (1.575 m), weight 200 lb (90.7 kg), last menstrual period 10/03/2017, SpO2 99 %. DISPOSITION  Darrion Leger was discharged to home in stable condition. Caitlyn Suero MD am the primary clinician of record.     (Please note this note was completed with a voice recognition program.  Efforts were made to edit the dictations but occasionally words are mis-transcribed.)        Deisy Tan MD  07/25/22 0570

## 2022-07-22 NOTE — DISCHARGE INSTRUCTIONS
Your x-ray does not show any evidence of a fracture. You likely have a contusion/sprain of your forearm and wrist.  You have been given a sling to wear for comfort. Alternate over-the-counter Tylenol and Motrin for pain. Please follow-up with your primary doctor next week. You can apply ice in 20-minute intervals.

## 2022-07-22 NOTE — ED NOTES
Discharge instructions and medications reviewed with patient. Patient verbalized understanding of medications and follow up. All questions answered at this time. Skin warm, pink, and dry. Patient alert and oriented x4. Pt ambulated to lobby with a stable gait. Patient discharged home with 0 prescriptions.        Jemal Gloria RN  07/22/22 0500

## 2022-08-01 DIAGNOSIS — K58.1 IRRITABLE BOWEL SYNDROME WITH CONSTIPATION: ICD-10-CM

## 2022-08-02 RX ORDER — LINACLOTIDE 145 UG/1
CAPSULE, GELATIN COATED ORAL
Qty: 30 CAPSULE | Refills: 5 | Status: SHIPPED | OUTPATIENT
Start: 2022-08-02

## 2022-08-11 ENCOUNTER — NURSE ONLY (OUTPATIENT)
Dept: FAMILY MEDICINE CLINIC | Age: 57
End: 2022-08-11
Payer: MEDICARE

## 2022-08-11 DIAGNOSIS — R73.9 HYPERGLYCEMIA: ICD-10-CM

## 2022-08-11 DIAGNOSIS — Z13.1 DIABETES MELLITUS SCREENING: ICD-10-CM

## 2022-08-11 DIAGNOSIS — Z11.59 ENCOUNTER FOR HEPATITIS C SCREENING TEST FOR LOW RISK PATIENT: ICD-10-CM

## 2022-08-11 DIAGNOSIS — K21.9 GASTROESOPHAGEAL REFLUX DISEASE WITHOUT ESOPHAGITIS: ICD-10-CM

## 2022-08-11 LAB
A/G RATIO: 2 (ref 1.1–2.2)
ALBUMIN SERPL-MCNC: 4.8 G/DL (ref 3.4–5)
ALP BLD-CCNC: 96 U/L (ref 40–129)
ALT SERPL-CCNC: 17 U/L (ref 10–40)
ANION GAP SERPL CALCULATED.3IONS-SCNC: 15 MMOL/L (ref 3–16)
AST SERPL-CCNC: 15 U/L (ref 15–37)
BILIRUB SERPL-MCNC: <0.2 MG/DL (ref 0–1)
BUN BLDV-MCNC: 12 MG/DL (ref 7–20)
CALCIUM SERPL-MCNC: 10.5 MG/DL (ref 8.3–10.6)
CHLORIDE BLD-SCNC: 98 MMOL/L (ref 99–110)
CO2: 27 MMOL/L (ref 21–32)
CREAT SERPL-MCNC: 1 MG/DL (ref 0.6–1.1)
GFR AFRICAN AMERICAN: >60
GFR NON-AFRICAN AMERICAN: 57
GLUCOSE BLD-MCNC: 117 MG/DL (ref 70–99)
HAV IGM SER IA-ACNC: NORMAL
HEPATITIS B CORE IGM ANTIBODY: NORMAL
HEPATITIS B SURFACE ANTIGEN INTERPRETATION: NORMAL
HEPATITIS C ANTIBODY INTERPRETATION: NORMAL
POTASSIUM SERPL-SCNC: 4.4 MMOL/L (ref 3.5–5.1)
SODIUM BLD-SCNC: 140 MMOL/L (ref 136–145)
TOTAL PROTEIN: 7.2 G/DL (ref 6.4–8.2)

## 2022-08-11 PROCEDURE — 36415 COLL VENOUS BLD VENIPUNCTURE: CPT | Performed by: NURSE PRACTITIONER

## 2022-08-12 LAB
ESTIMATED AVERAGE GLUCOSE: 128.4 MG/DL
HBA1C MFR BLD: 6.1 %

## 2022-10-13 ENCOUNTER — TELEPHONE (OUTPATIENT)
Dept: FAMILY MEDICINE CLINIC | Age: 57
End: 2022-10-13

## 2022-10-13 NOTE — TELEPHONE ENCOUNTER
Patient states fell 3 to 4 weeks on her right wrist again the one she previously broke and has pins and screws. It was causing her extreme pain a week or so after that so she went to the ER and evaluated her said nothing was broken but she continues to still severe pain and cant hold things. Patient was advised to schedule back with Dr. Maureen Hay who did her surgery so they can evaluate her.

## 2022-11-03 RX ORDER — ALBUTEROL SULFATE 90 UG/1
2 AEROSOL, METERED RESPIRATORY (INHALATION) EVERY 4 HOURS PRN
Qty: 1 EACH | Refills: 5 | Status: SHIPPED | OUTPATIENT
Start: 2022-11-03

## 2022-11-03 RX ORDER — PRAMIPEXOLE DIHYDROCHLORIDE 0.25 MG/1
0.25 TABLET ORAL 3 TIMES DAILY
Qty: 90 TABLET | Refills: 1 | Status: SHIPPED | OUTPATIENT
Start: 2022-11-03

## 2022-11-16 DIAGNOSIS — R11.2 NAUSEA AND VOMITING: ICD-10-CM

## 2022-11-17 RX ORDER — ONDANSETRON 4 MG/1
TABLET, FILM COATED ORAL
Qty: 30 TABLET | Refills: 3 | Status: SHIPPED | OUTPATIENT
Start: 2022-11-17

## 2022-12-08 ENCOUNTER — TELEPHONE (OUTPATIENT)
Dept: FAMILY MEDICINE CLINIC | Age: 57
End: 2022-12-08

## 2022-12-08 NOTE — TELEPHONE ENCOUNTER
----- Message from Kymberly Jerome sent at 12/8/2022 12:43 PM EST -----  Subject: Message to Provider    QUESTIONS  Information for Provider? The pt called on 12/8/2022 she stated she missed   her appt on 12/6/2022 due to moving.   ---------------------------------------------------------------------------  --------------  [employer]  [phone]; OK to leave message on voicemail  ---------------------------------------------------------------------------  --------------  SCRIPT ANSWERS  Relationship to Patient?  Self

## 2022-12-28 NOTE — TELEPHONE ENCOUNTER
Future appt scheduled 0 appt scheduled    Return in about 6 months (around 12/21/2022),                  Last appt 06/01/2022      Last Written  03/08/2022    PROAIR  (90 Base) MCG/ACT inhaler  8.5 g   5 RF         Pt called and stated that her and her spouse are both transferring to new provider but appointments are not until next month and are asking to get these refilled to get them throught until their appointments

## 2022-12-29 ENCOUNTER — TELEPHONE (OUTPATIENT)
Dept: ADMINISTRATIVE | Age: 57
End: 2022-12-29

## 2022-12-29 NOTE — TELEPHONE ENCOUNTER
PA submitted VIA CMM for  Ventolin  (90 Base)MCG/ACT aerosol Key: [id number] - PA Case ID: [id number] - Rx #: [id number] PENDING    This medication or product was previously approved on [id number] from 12/29/2022 to 12/31/2023

## 2023-01-03 ENCOUNTER — TELEPHONE (OUTPATIENT)
Dept: FAMILY MEDICINE CLINIC | Age: 58
End: 2023-01-03

## 2023-01-03 NOTE — TELEPHONE ENCOUNTER
When speaking to patient she states she is seeing new doctor in [place] but her new patient appt is not until 1/23. Advised her she would need to got to urgent care.

## 2023-01-03 NOTE — TELEPHONE ENCOUNTER
----- Message from Logan Ivyer sent at 1/3/2023  9:20 AM EST -----  Subject: Message to Provider    QUESTIONS  Information for Provider? Patient is requesting a RX for EUCRISA for   exczema on her left leg. Please advise. Please send to [address] [place] [address], [address] [phone]  ---------------------------------------------------------------------------  --------------  Crystal AMTUTE  [phone]; OK to leave message on voicemail  ---------------------------------------------------------------------------  --------------  SCRIPT ANSWERS  Relationship to Patient?  Self

## 2023-01-10 DIAGNOSIS — K58.1 IRRITABLE BOWEL SYNDROME WITH CONSTIPATION: ICD-10-CM

## 2023-01-10 RX ORDER — MILNACIPRAN HYDROCHLORIDE 25 MG/1
TABLET, FILM COATED ORAL
Qty: 120 TABLET | Refills: 0 | OUTPATIENT
Start: 2023-01-10

## 2023-01-10 RX ORDER — PRAMIPEXOLE DIHYDROCHLORIDE 0.25 MG/1
TABLET ORAL
Qty: 90 TABLET | Refills: 0 | OUTPATIENT
Start: 2023-01-10

## 2023-01-13 ENCOUNTER — TELEPHONE (OUTPATIENT)
Dept: FAMILY MEDICINE CLINIC | Age: 58
End: 2023-01-13

## 2023-01-19 DIAGNOSIS — K58.1 IRRITABLE BOWEL SYNDROME WITH CONSTIPATION: ICD-10-CM

## 2023-01-19 RX ORDER — MILNACIPRAN HYDROCHLORIDE 25 MG/1
TABLET, FILM COATED ORAL
Qty: 120 TABLET | Refills: 0 | OUTPATIENT
Start: 2023-01-19

## 2023-02-22 DIAGNOSIS — K58.1 IRRITABLE BOWEL SYNDROME WITH CONSTIPATION: ICD-10-CM

## 2023-02-22 RX ORDER — LINACLOTIDE 145 UG/1
CAPSULE, GELATIN COATED ORAL
Qty: 30 CAPSULE | Refills: 1 | OUTPATIENT
Start: 2023-02-22

## 2023-03-10 ENCOUNTER — TELEPHONE (OUTPATIENT)
Dept: FAMILY MEDICINE CLINIC | Age: 58
End: 2023-03-10

## 2023-05-24 ENCOUNTER — COMMUNITY OUTREACH (OUTPATIENT)
Dept: FAMILY MEDICINE CLINIC | Age: 58
End: 2023-05-24

## 2023-08-27 LAB
AVERAGE GLUCOSE: NORMAL
HBA1C MFR BLD: 5.8 %

## 2023-09-23 ENCOUNTER — HOSPITAL ENCOUNTER (INPATIENT)
Age: 58
LOS: 1 days | Discharge: ANOTHER HOSPITAL | DRG: 552 | End: 2023-09-26
Attending: EMERGENCY MEDICINE | Admitting: STUDENT IN AN ORGANIZED HEALTH CARE EDUCATION/TRAINING PROGRAM
Payer: MEDICARE

## 2023-09-23 DIAGNOSIS — R53.1 RIGHT SIDED WEAKNESS: Primary | ICD-10-CM

## 2023-09-23 PROCEDURE — 84100 ASSAY OF PHOSPHORUS: CPT

## 2023-09-23 PROCEDURE — 83735 ASSAY OF MAGNESIUM: CPT

## 2023-09-23 PROCEDURE — 85025 COMPLETE CBC W/AUTO DIFF WBC: CPT

## 2023-09-23 PROCEDURE — 80053 COMPREHEN METABOLIC PANEL: CPT

## 2023-09-23 PROCEDURE — 99285 EMERGENCY DEPT VISIT HI MDM: CPT

## 2023-09-23 PROCEDURE — 82607 VITAMIN B-12: CPT

## 2023-09-23 PROCEDURE — 82746 ASSAY OF FOLIC ACID SERUM: CPT

## 2023-09-23 ASSESSMENT — PAIN SCALES - GENERAL: PAINLEVEL_OUTOF10: 10

## 2023-09-23 ASSESSMENT — PAIN - FUNCTIONAL ASSESSMENT: PAIN_FUNCTIONAL_ASSESSMENT: 0-10

## 2023-09-24 ENCOUNTER — APPOINTMENT (OUTPATIENT)
Dept: CT IMAGING | Age: 58
DRG: 552 | End: 2023-09-24
Payer: MEDICARE

## 2023-09-24 PROBLEM — R53.1 RIGHT SIDED WEAKNESS: Status: ACTIVE | Noted: 2023-09-24

## 2023-09-24 LAB
ALBUMIN SERPL-MCNC: 3.5 G/DL (ref 3.4–5)
ALBUMIN/GLOB SERPL: 1.3 {RATIO} (ref 1.1–2.2)
ALP SERPL-CCNC: 77 U/L (ref 40–129)
ALT SERPL-CCNC: 13 U/L (ref 10–40)
AMPHETAMINES UR QL SCN>1000 NG/ML: ABNORMAL
ANION GAP SERPL CALCULATED.3IONS-SCNC: 10 MMOL/L (ref 3–16)
ANION GAP SERPL CALCULATED.3IONS-SCNC: 11 MMOL/L (ref 3–16)
AST SERPL-CCNC: 13 U/L (ref 15–37)
BACTERIA URNS QL MICRO: ABNORMAL /HPF
BARBITURATES UR QL SCN>200 NG/ML: ABNORMAL
BASE EXCESS BLDA CALC-SCNC: 1 MMOL/L (ref -3–3)
BASOPHILS # BLD: 0.1 K/UL (ref 0–0.2)
BASOPHILS NFR BLD: 0.6 %
BENZODIAZ UR QL SCN>200 NG/ML: ABNORMAL
BILIRUB SERPL-MCNC: <0.2 MG/DL (ref 0–1)
BILIRUB UR QL STRIP.AUTO: NEGATIVE
BUN SERPL-MCNC: 14 MG/DL (ref 7–20)
BUN SERPL-MCNC: 16 MG/DL (ref 7–20)
CALCIUM SERPL-MCNC: 9.3 MG/DL (ref 8.3–10.6)
CALCIUM SERPL-MCNC: 9.3 MG/DL (ref 8.3–10.6)
CANNABINOIDS UR QL SCN>50 NG/ML: POSITIVE
CHLORIDE SERPL-SCNC: 103 MMOL/L (ref 99–110)
CHLORIDE SERPL-SCNC: 104 MMOL/L (ref 99–110)
CLARITY UR: CLEAR
CO2 BLDA-SCNC: 26.3 MMOL/L
CO2 SERPL-SCNC: 28 MMOL/L (ref 21–32)
CO2 SERPL-SCNC: 28 MMOL/L (ref 21–32)
COCAINE UR QL SCN: ABNORMAL
COHGB MFR BLDA: 1 % (ref 0–1.5)
COLOR UR: YELLOW
CREAT SERPL-MCNC: 1 MG/DL (ref 0.6–1.1)
CREAT SERPL-MCNC: 1.1 MG/DL (ref 0.6–1.1)
DEPRECATED RDW RBC AUTO: 13.2 % (ref 12.4–15.4)
DEPRECATED RDW RBC AUTO: 13.3 % (ref 12.4–15.4)
DRUG SCREEN COMMENT UR-IMP: ABNORMAL
EOSINOPHIL # BLD: 0.3 K/UL (ref 0–0.6)
EOSINOPHIL NFR BLD: 2.9 %
EPI CELLS #/AREA URNS HPF: ABNORMAL /HPF (ref 0–5)
FENTANYL SCREEN, URINE: ABNORMAL
FOLATE SERPL-MCNC: >20 NG/ML (ref 4.78–24.2)
GFR SERPLBLD CREATININE-BSD FMLA CKD-EPI: 59 ML/MIN/{1.73_M2}
GFR SERPLBLD CREATININE-BSD FMLA CKD-EPI: >60 ML/MIN/{1.73_M2}
GLUCOSE SERPL-MCNC: 155 MG/DL (ref 70–99)
GLUCOSE SERPL-MCNC: 161 MG/DL (ref 70–99)
GLUCOSE UR STRIP.AUTO-MCNC: NEGATIVE MG/DL
HCO3 BLDA-SCNC: 25.1 MMOL/L (ref 21–29)
HCT VFR BLD AUTO: 33.5 % (ref 36–48)
HCT VFR BLD AUTO: 34.4 % (ref 36–48)
HGB BLD-MCNC: 11.3 G/DL (ref 12–16)
HGB BLD-MCNC: 11.7 G/DL (ref 12–16)
HGB BLDA-MCNC: 13.1 G/DL (ref 12–16)
HGB UR QL STRIP.AUTO: NEGATIVE
HYALINE CASTS #/AREA URNS LPF: ABNORMAL /LPF (ref 0–2)
KETONES UR STRIP.AUTO-MCNC: NEGATIVE MG/DL
LEUKOCYTE ESTERASE UR QL STRIP.AUTO: ABNORMAL
LYMPHOCYTES # BLD: 3.1 K/UL (ref 1–5.1)
LYMPHOCYTES NFR BLD: 31.5 %
MAGNESIUM SERPL-MCNC: 2.1 MG/DL (ref 1.8–2.4)
MAGNESIUM SERPL-MCNC: 2.2 MG/DL (ref 1.8–2.4)
MCH RBC QN AUTO: 32.2 PG (ref 26–34)
MCH RBC QN AUTO: 32.3 PG (ref 26–34)
MCHC RBC AUTO-ENTMCNC: 33.7 G/DL (ref 31–36)
MCHC RBC AUTO-ENTMCNC: 34 G/DL (ref 31–36)
MCV RBC AUTO: 94.9 FL (ref 80–100)
MCV RBC AUTO: 95.5 FL (ref 80–100)
METHADONE UR QL SCN>300 NG/ML: ABNORMAL
METHGB MFR BLDA: 0.5 %
MONOCYTES # BLD: 1 K/UL (ref 0–1.3)
MONOCYTES NFR BLD: 9.8 %
MUCOUS THREADS #/AREA URNS LPF: ABNORMAL /LPF
NEUTROPHILS # BLD: 5.5 K/UL (ref 1.7–7.7)
NEUTROPHILS NFR BLD: 55.2 %
NITRITE UR QL STRIP.AUTO: NEGATIVE
O2 THERAPY: NORMAL
OPIATES UR QL SCN>300 NG/ML: ABNORMAL
OXYCODONE UR QL SCN: ABNORMAL
PCO2 BLDA: 38.3 MMHG (ref 35–45)
PCP UR QL SCN>25 NG/ML: ABNORMAL
PH BLDA: 7.43 [PH] (ref 7.35–7.45)
PH UR STRIP.AUTO: 5.5 [PH] (ref 5–8)
PH UR STRIP: 6 [PH]
PHOSPHATE SERPL-MCNC: 5 MG/DL (ref 2.5–4.9)
PLATELET # BLD AUTO: 292 K/UL (ref 135–450)
PLATELET # BLD AUTO: 292 K/UL (ref 135–450)
PMV BLD AUTO: 8.1 FL (ref 5–10.5)
PMV BLD AUTO: 8.3 FL (ref 5–10.5)
PO2 BLDA: 100.5 MMHG (ref 75–108)
POTASSIUM SERPL-SCNC: 3.4 MMOL/L (ref 3.5–5.1)
POTASSIUM SERPL-SCNC: 3.6 MMOL/L (ref 3.5–5.1)
PROT SERPL-MCNC: 6.1 G/DL (ref 6.4–8.2)
PROT UR STRIP.AUTO-MCNC: NEGATIVE MG/DL
RBC # BLD AUTO: 3.5 M/UL (ref 4–5.2)
RBC # BLD AUTO: 3.63 M/UL (ref 4–5.2)
RBC #/AREA URNS HPF: ABNORMAL /HPF (ref 0–4)
SAO2 % BLDA: 97.4 %
SODIUM SERPL-SCNC: 141 MMOL/L (ref 136–145)
SODIUM SERPL-SCNC: 143 MMOL/L (ref 136–145)
SP GR UR STRIP.AUTO: 1.02 (ref 1–1.03)
UA COMPLETE W REFLEX CULTURE PNL UR: YES
UA DIPSTICK W REFLEX MICRO PNL UR: YES
URN SPEC COLLECT METH UR: ABNORMAL
UROBILINOGEN UR STRIP-ACNC: 0.2 E.U./DL
VIT B12 SERPL-MCNC: 559 PG/ML (ref 211–911)
WBC # BLD AUTO: 9 K/UL (ref 4–11)
WBC # BLD AUTO: 9.9 K/UL (ref 4–11)
WBC #/AREA URNS HPF: ABNORMAL /HPF (ref 0–5)

## 2023-09-24 PROCEDURE — 97162 PT EVAL MOD COMPLEX 30 MIN: CPT

## 2023-09-24 PROCEDURE — 36600 WITHDRAWAL OF ARTERIAL BLOOD: CPT

## 2023-09-24 PROCEDURE — 87086 URINE CULTURE/COLONY COUNT: CPT

## 2023-09-24 PROCEDURE — 2580000003 HC RX 258: Performed by: NURSE PRACTITIONER

## 2023-09-24 PROCEDURE — 6370000000 HC RX 637 (ALT 250 FOR IP): Performed by: NURSE PRACTITIONER

## 2023-09-24 PROCEDURE — G0378 HOSPITAL OBSERVATION PER HR: HCPCS

## 2023-09-24 PROCEDURE — 6370000000 HC RX 637 (ALT 250 FOR IP): Performed by: INTERNAL MEDICINE

## 2023-09-24 PROCEDURE — 97530 THERAPEUTIC ACTIVITIES: CPT

## 2023-09-24 PROCEDURE — 70450 CT HEAD/BRAIN W/O DYE: CPT

## 2023-09-24 PROCEDURE — 82803 BLOOD GASES ANY COMBINATION: CPT

## 2023-09-24 PROCEDURE — 97166 OT EVAL MOD COMPLEX 45 MIN: CPT

## 2023-09-24 PROCEDURE — 85027 COMPLETE CBC AUTOMATED: CPT

## 2023-09-24 PROCEDURE — 6360000002 HC RX W HCPCS: Performed by: NURSE PRACTITIONER

## 2023-09-24 PROCEDURE — 36415 COLL VENOUS BLD VENIPUNCTURE: CPT

## 2023-09-24 PROCEDURE — 80048 BASIC METABOLIC PNL TOTAL CA: CPT

## 2023-09-24 PROCEDURE — 80307 DRUG TEST PRSMV CHEM ANLYZR: CPT

## 2023-09-24 PROCEDURE — 83735 ASSAY OF MAGNESIUM: CPT

## 2023-09-24 PROCEDURE — 97116 GAIT TRAINING THERAPY: CPT

## 2023-09-24 PROCEDURE — 81001 URINALYSIS AUTO W/SCOPE: CPT

## 2023-09-24 RX ORDER — SODIUM CHLORIDE 0.9 % (FLUSH) 0.9 %
5-40 SYRINGE (ML) INJECTION EVERY 12 HOURS SCHEDULED
Status: DISCONTINUED | OUTPATIENT
Start: 2023-09-24 | End: 2023-09-27 | Stop reason: HOSPADM

## 2023-09-24 RX ORDER — ALBUTEROL SULFATE 90 UG/1
2 AEROSOL, METERED RESPIRATORY (INHALATION) EVERY 4 HOURS PRN
Status: DISCONTINUED | OUTPATIENT
Start: 2023-09-24 | End: 2023-09-24

## 2023-09-24 RX ORDER — ASPIRIN 81 MG/1
81 TABLET, CHEWABLE ORAL DAILY
Status: DISCONTINUED | OUTPATIENT
Start: 2023-09-25 | End: 2023-09-26

## 2023-09-24 RX ORDER — LAMOTRIGINE 100 MG/1
100 TABLET ORAL 2 TIMES DAILY
Status: DISCONTINUED | OUTPATIENT
Start: 2023-09-24 | End: 2023-09-27 | Stop reason: HOSPADM

## 2023-09-24 RX ORDER — SODIUM CHLORIDE 0.9 % (FLUSH) 0.9 %
5-40 SYRINGE (ML) INJECTION PRN
Status: DISCONTINUED | OUTPATIENT
Start: 2023-09-24 | End: 2023-09-27 | Stop reason: HOSPADM

## 2023-09-24 RX ORDER — ONDANSETRON 4 MG/1
4 TABLET, ORALLY DISINTEGRATING ORAL EVERY 8 HOURS PRN
Status: DISCONTINUED | OUTPATIENT
Start: 2023-09-24 | End: 2023-09-27 | Stop reason: HOSPADM

## 2023-09-24 RX ORDER — OLANZAPINE 5 MG/1
20 TABLET ORAL NIGHTLY
Status: DISCONTINUED | OUTPATIENT
Start: 2023-09-24 | End: 2023-09-27 | Stop reason: HOSPADM

## 2023-09-24 RX ORDER — POLYETHYLENE GLYCOL 3350 17 G/17G
17 POWDER, FOR SOLUTION ORAL DAILY PRN
Status: DISCONTINUED | OUTPATIENT
Start: 2023-09-24 | End: 2023-09-27 | Stop reason: HOSPADM

## 2023-09-24 RX ORDER — GABAPENTIN 400 MG/1
800 CAPSULE ORAL 3 TIMES DAILY
Status: DISCONTINUED | OUTPATIENT
Start: 2023-09-24 | End: 2023-09-27 | Stop reason: HOSPADM

## 2023-09-24 RX ORDER — SODIUM CHLORIDE 9 MG/ML
INJECTION, SOLUTION INTRAVENOUS PRN
Status: DISCONTINUED | OUTPATIENT
Start: 2023-09-24 | End: 2023-09-27 | Stop reason: HOSPADM

## 2023-09-24 RX ORDER — TRAMADOL HYDROCHLORIDE 50 MG/1
50 TABLET ORAL EVERY 6 HOURS PRN
Status: COMPLETED | OUTPATIENT
Start: 2023-09-24 | End: 2023-09-25

## 2023-09-24 RX ORDER — ASPIRIN 81 MG/1
81 TABLET, CHEWABLE ORAL DAILY
Status: DISCONTINUED | OUTPATIENT
Start: 2023-09-25 | End: 2023-09-24

## 2023-09-24 RX ORDER — PRAZOSIN HYDROCHLORIDE 1 MG/1
1 CAPSULE ORAL NIGHTLY
Status: DISCONTINUED | OUTPATIENT
Start: 2023-09-24 | End: 2023-09-27 | Stop reason: HOSPADM

## 2023-09-24 RX ORDER — ASPIRIN 300 MG/1
300 SUPPOSITORY RECTAL DAILY
Status: DISCONTINUED | OUTPATIENT
Start: 2023-09-25 | End: 2023-09-24

## 2023-09-24 RX ORDER — ATORVASTATIN CALCIUM 80 MG/1
80 TABLET, FILM COATED ORAL NIGHTLY
Status: DISCONTINUED | OUTPATIENT
Start: 2023-09-24 | End: 2023-09-27 | Stop reason: HOSPADM

## 2023-09-24 RX ORDER — ALBUTEROL SULFATE 90 UG/1
2 AEROSOL, METERED RESPIRATORY (INHALATION) EVERY 4 HOURS PRN
Status: DISCONTINUED | OUTPATIENT
Start: 2023-09-24 | End: 2023-09-27 | Stop reason: HOSPADM

## 2023-09-24 RX ORDER — ENOXAPARIN SODIUM 100 MG/ML
40 INJECTION SUBCUTANEOUS DAILY
Status: DISCONTINUED | OUTPATIENT
Start: 2023-09-24 | End: 2023-09-27 | Stop reason: HOSPADM

## 2023-09-24 RX ORDER — POTASSIUM CHLORIDE 20 MEQ/1
40 TABLET, EXTENDED RELEASE ORAL ONCE
Status: COMPLETED | OUTPATIENT
Start: 2023-09-24 | End: 2023-09-24

## 2023-09-24 RX ORDER — BUPROPION HYDROCHLORIDE 75 MG/1
75 TABLET ORAL DAILY
Status: DISCONTINUED | OUTPATIENT
Start: 2023-09-24 | End: 2023-09-27 | Stop reason: HOSPADM

## 2023-09-24 RX ORDER — ONDANSETRON 2 MG/ML
4 INJECTION INTRAMUSCULAR; INTRAVENOUS EVERY 6 HOURS PRN
Status: DISCONTINUED | OUTPATIENT
Start: 2023-09-24 | End: 2023-09-27 | Stop reason: HOSPADM

## 2023-09-24 RX ORDER — ACETAMINOPHEN 325 MG/1
650 TABLET ORAL EVERY 6 HOURS PRN
Status: DISCONTINUED | OUTPATIENT
Start: 2023-09-24 | End: 2023-09-27 | Stop reason: HOSPADM

## 2023-09-24 RX ADMIN — LAMOTRIGINE 100 MG: 100 TABLET ORAL at 09:16

## 2023-09-24 RX ADMIN — MILNACIPRAN HYDROCHLORIDE 12.5 MG: 12.5 TABLET, FILM COATED ORAL at 09:15

## 2023-09-24 RX ADMIN — MILNACIPRAN HYDROCHLORIDE 12.5 MG: 12.5 TABLET, FILM COATED ORAL at 20:46

## 2023-09-24 RX ADMIN — POTASSIUM CHLORIDE 40 MEQ: 1500 TABLET, EXTENDED RELEASE ORAL at 09:18

## 2023-09-24 RX ADMIN — GABAPENTIN 800 MG: 400 CAPSULE ORAL at 14:42

## 2023-09-24 RX ADMIN — TRAMADOL HYDROCHLORIDE 50 MG: 50 TABLET ORAL at 10:49

## 2023-09-24 RX ADMIN — GABAPENTIN 800 MG: 400 CAPSULE ORAL at 09:16

## 2023-09-24 RX ADMIN — GABAPENTIN 800 MG: 400 CAPSULE ORAL at 20:45

## 2023-09-24 RX ADMIN — OLANZAPINE 20 MG: 5 TABLET, FILM COATED ORAL at 20:46

## 2023-09-24 RX ADMIN — ATORVASTATIN CALCIUM 80 MG: 80 TABLET, FILM COATED ORAL at 20:45

## 2023-09-24 RX ADMIN — Medication 10 ML: at 09:18

## 2023-09-24 RX ADMIN — BUPROPION HYDROCHLORIDE 75 MG: 75 TABLET, FILM COATED ORAL at 09:16

## 2023-09-24 RX ADMIN — TRAMADOL HYDROCHLORIDE 50 MG: 50 TABLET ORAL at 05:37

## 2023-09-24 RX ADMIN — LAMOTRIGINE 100 MG: 100 TABLET ORAL at 20:46

## 2023-09-24 RX ADMIN — ENOXAPARIN SODIUM 40 MG: 100 INJECTION SUBCUTANEOUS at 09:16

## 2023-09-24 RX ADMIN — SERTRALINE 100 MG: 50 TABLET, FILM COATED ORAL at 09:16

## 2023-09-24 RX ADMIN — PRAZOSIN HYDROCHLORIDE 1 MG: 1 CAPSULE ORAL at 20:48

## 2023-09-24 RX ADMIN — TRAMADOL HYDROCHLORIDE 50 MG: 50 TABLET ORAL at 20:44

## 2023-09-24 RX ADMIN — Medication 10 ML: at 20:49

## 2023-09-24 ASSESSMENT — PAIN DESCRIPTION - ORIENTATION
ORIENTATION: RIGHT

## 2023-09-24 ASSESSMENT — PAIN SCALES - GENERAL
PAINLEVEL_OUTOF10: 9
PAINLEVEL_OUTOF10: 7
PAINLEVEL_OUTOF10: 10
PAINLEVEL_OUTOF10: 10
PAINLEVEL_OUTOF10: 8
PAINLEVEL_OUTOF10: 10
PAINLEVEL_OUTOF10: 10

## 2023-09-24 ASSESSMENT — PAIN DESCRIPTION - LOCATION
LOCATION: ARM;LEG;CHEST
LOCATION: ARM;LEG
LOCATION: GENERALIZED
LOCATION: ARM;LEG

## 2023-09-24 NOTE — ED PROVIDER NOTES
total shared critical care time provided. The critical care time excludes separately billable procedures and updating family. Time spent is specifically for management of the presenting complaint and symptoms initially, direct bedside care, reevaluation, review of records, and consultation. There was a high probability of clinically significant life-threatening deterioration in the patient's condition, which required my urgent intervention. This chart was generated in part by using Dragon Dictation system and may contain errors related to that system including errors in grammar, punctuation, and spelling, as well as words and phrases that may be inappropriate. If there are any questions or concerns please feel free to contact the dictating provider for clarification.          Xander Harrell MD  09/24/23 [id number]

## 2023-09-24 NOTE — CONSULTS
Consult Placed     Who: Dr Helio Gauthier  Date:9/24/23  URFA:[id number]     Electronically signed by Darius Alexis on 9/24/2023 at 7:39 AM

## 2023-09-24 NOTE — H&P
Hospital Medicine History & Physical      Date of Admission: 9/23/2023    Date of Service:  Pt seen/examined on 9/24/2023     []Admitted to Inpatient with expected LOS greater than two midnights due to medical therapy. [x]Placed in Observation status. Chief Admission Complaint:  right sided weakness, inability to care for self    Presenting Admission History:      65 y.o. female, with PMH of COPD, HTN and schizophrenia, who presented to [facility] with right sided weakness. History obtained from the patient and review of EMR. The patient stated she has been experiencing right sided weakness for the last month and a half. She stated the weakness has never gone away and feels that over the last few days it seems like it is getting progressively worse. The patient stated she lives with family and they are having to assist her to the bathroom and she is having frequent falls from the weakness. Per EMR Woode Orn Everywhere, the patient was seen at [facility] on 8/19/2023 for right-sided weakness. At that time she had been experiencing right arm and leg weakness for 2 weeks. At that time she was also experiencing some issues with balance, but denied any vision changes and or trouble swallowing. There was concern the patient may have had a stroke, but she signed out AMA. However, the patient returned to the emergency department that night due to worsening of right-sided weakness and having trouble with ambulation. At that time she reported that she has to drag her right foot and pull herself up the stairs due to weakness. Per care everywhere, the patient had a CT head on 8/27/2023 that revealed no acute intracranial abnormality. CTA head and neck on 8/18/2023 that revealed no evidence of carotid or vertebral artery stenosis. She had a CT cervical, lumbar and thoracic spine on 8/28/2023 that revealed no acute fracture or traumatic malalignment and/or subluxation.   An MRI of the brain was

## 2023-09-25 ENCOUNTER — APPOINTMENT (OUTPATIENT)
Dept: MRI IMAGING | Age: 58
DRG: 552 | End: 2023-09-25
Payer: MEDICARE

## 2023-09-25 LAB
ANION GAP SERPL CALCULATED.3IONS-SCNC: 10 MMOL/L (ref 3–16)
BACTERIA UR CULT: NORMAL
BUN SERPL-MCNC: 12 MG/DL (ref 7–20)
CALCIUM SERPL-MCNC: 9.6 MG/DL (ref 8.3–10.6)
CHLORIDE SERPL-SCNC: 103 MMOL/L (ref 99–110)
CO2 SERPL-SCNC: 26 MMOL/L (ref 21–32)
CREAT SERPL-MCNC: 0.7 MG/DL (ref 0.6–1.1)
GFR SERPLBLD CREATININE-BSD FMLA CKD-EPI: >60 ML/MIN/{1.73_M2}
GLUCOSE SERPL-MCNC: 109 MG/DL (ref 70–99)
POTASSIUM SERPL-SCNC: 4.7 MMOL/L (ref 3.5–5.1)
SODIUM SERPL-SCNC: 139 MMOL/L (ref 136–145)

## 2023-09-25 PROCEDURE — 80048 BASIC METABOLIC PNL TOTAL CA: CPT

## 2023-09-25 PROCEDURE — G0378 HOSPITAL OBSERVATION PER HR: HCPCS | Performed by: INTERNAL MEDICINE

## 2023-09-25 PROCEDURE — 6370000000 HC RX 637 (ALT 250 FOR IP): Performed by: NURSE PRACTITIONER

## 2023-09-25 PROCEDURE — APPSS60 APP SPLIT SHARED TIME 46-60 MINUTES: Performed by: NURSE PRACTITIONER

## 2023-09-25 PROCEDURE — G0378 HOSPITAL OBSERVATION PER HR: HCPCS

## 2023-09-25 PROCEDURE — 72158 MRI LUMBAR SPINE W/O & W/DYE: CPT

## 2023-09-25 PROCEDURE — 72156 MRI NECK SPINE W/O & W/DYE: CPT

## 2023-09-25 PROCEDURE — 6360000002 HC RX W HCPCS: Performed by: NURSE PRACTITIONER

## 2023-09-25 PROCEDURE — 97535 SELF CARE MNGMENT TRAINING: CPT

## 2023-09-25 PROCEDURE — 36415 COLL VENOUS BLD VENIPUNCTURE: CPT

## 2023-09-25 PROCEDURE — A9579 GAD-BASE MR CONTRAST NOS,1ML: HCPCS | Performed by: NURSE PRACTITIONER

## 2023-09-25 PROCEDURE — 97110 THERAPEUTIC EXERCISES: CPT

## 2023-09-25 PROCEDURE — 99223 1ST HOSP IP/OBS HIGH 75: CPT | Performed by: PSYCHIATRY & NEUROLOGY

## 2023-09-25 PROCEDURE — 6370000000 HC RX 637 (ALT 250 FOR IP): Performed by: INTERNAL MEDICINE

## 2023-09-25 PROCEDURE — 72157 MRI CHEST SPINE W/O & W/DYE: CPT

## 2023-09-25 PROCEDURE — 6360000004 HC RX CONTRAST MEDICATION: Performed by: NURSE PRACTITIONER

## 2023-09-25 PROCEDURE — 2580000003 HC RX 258: Performed by: NURSE PRACTITIONER

## 2023-09-25 RX ORDER — HYDROCODONE BITARTRATE AND ACETAMINOPHEN 5; 325 MG/1; MG/1
1 TABLET ORAL EVERY 6 HOURS PRN
Status: DISCONTINUED | OUTPATIENT
Start: 2023-09-25 | End: 2023-09-27 | Stop reason: HOSPADM

## 2023-09-25 RX ADMIN — GABAPENTIN 800 MG: 400 CAPSULE ORAL at 08:13

## 2023-09-25 RX ADMIN — ATORVASTATIN CALCIUM 80 MG: 80 TABLET, FILM COATED ORAL at 20:10

## 2023-09-25 RX ADMIN — MILNACIPRAN HYDROCHLORIDE 12.5 MG: 12.5 TABLET, FILM COATED ORAL at 20:16

## 2023-09-25 RX ADMIN — HYDROCODONE BITARTRATE AND ACETAMINOPHEN 1 TABLET: 5; 325 TABLET ORAL at 14:06

## 2023-09-25 RX ADMIN — LAMOTRIGINE 100 MG: 100 TABLET ORAL at 20:11

## 2023-09-25 RX ADMIN — ASPIRIN 81 MG 81 MG: 81 TABLET ORAL at 08:14

## 2023-09-25 RX ADMIN — LAMOTRIGINE 100 MG: 100 TABLET ORAL at 08:14

## 2023-09-25 RX ADMIN — Medication 10 ML: at 08:14

## 2023-09-25 RX ADMIN — ENOXAPARIN SODIUM 40 MG: 100 INJECTION SUBCUTANEOUS at 08:14

## 2023-09-25 RX ADMIN — MILNACIPRAN HYDROCHLORIDE 12.5 MG: 12.5 TABLET, FILM COATED ORAL at 08:14

## 2023-09-25 RX ADMIN — SERTRALINE 100 MG: 50 TABLET, FILM COATED ORAL at 08:14

## 2023-09-25 RX ADMIN — OLANZAPINE 20 MG: 5 TABLET, FILM COATED ORAL at 20:10

## 2023-09-25 RX ADMIN — GABAPENTIN 800 MG: 400 CAPSULE ORAL at 20:10

## 2023-09-25 RX ADMIN — PRAZOSIN HYDROCHLORIDE 1 MG: 1 CAPSULE ORAL at 20:11

## 2023-09-25 RX ADMIN — GABAPENTIN 800 MG: 400 CAPSULE ORAL at 14:06

## 2023-09-25 RX ADMIN — BUPROPION HYDROCHLORIDE 75 MG: 75 TABLET, FILM COATED ORAL at 08:14

## 2023-09-25 RX ADMIN — GADOTERIDOL 19 ML: 279.3 INJECTION, SOLUTION INTRAVENOUS at 16:58

## 2023-09-25 RX ADMIN — TRAMADOL HYDROCHLORIDE 50 MG: 50 TABLET ORAL at 06:18

## 2023-09-25 RX ADMIN — HYDROCODONE BITARTRATE AND ACETAMINOPHEN 1 TABLET: 5; 325 TABLET ORAL at 20:10

## 2023-09-25 RX ADMIN — POLYETHYLENE GLYCOL 3350 17 G: 17 POWDER, FOR SOLUTION ORAL at 06:18

## 2023-09-25 RX ADMIN — ACETAMINOPHEN 650 MG: 325 TABLET ORAL at 17:34

## 2023-09-25 ASSESSMENT — PAIN DESCRIPTION - LOCATION
LOCATION: BACK
LOCATION: LEG;ARM

## 2023-09-25 ASSESSMENT — PAIN SCALES - GENERAL
PAINLEVEL_OUTOF10: 9
PAINLEVEL_OUTOF10: 6
PAINLEVEL_OUTOF10: 6
PAINLEVEL_OUTOF10: 9

## 2023-09-25 ASSESSMENT — PAIN DESCRIPTION - ORIENTATION: ORIENTATION: RIGHT

## 2023-09-25 NOTE — CARE COORDINATION
Case Management Assessment  Initial Evaluation    Date/Time of Evaluation: 9/25/2023 10:35 AM  Assessment Completed by: Nat Beckett RN    If patient is discharged prior to next notation, then this note serves as note for discharge by case management. Patient Name: Martina Denise                   YOB: 1965  Diagnosis: Right sided weakness [R53.1]                   Date / Time: 9/23/2023 11:07 PM    Patient Admission Status: Observation   Readmission Risk (Low < 19, Mod (19-27), High > 27): No data recorded  Current PCP: Sowmya Nunez  PCP verified by CM? Yes    Chart Reviewed: Yes      History Provided by: Patient  Patient Orientation: Alert and Oriented, Person, Place, Situation    Patient Cognition: Alert    Hospitalization in the last 30 days (Readmission):  No    If yes, Readmission Assessment in  Navigator will be completed. Advance Directives:      Code Status: Full Code   Patient's Primary Decision Maker is: Legal Next of Kin (father Ruma Kelsey)    Primary Decision Maker: Edson Desir - [phone]    Discharge Planning:    Patient lives with: Family Members Type of Home: Apartment  Primary Care Giver: Self  Patient Support Systems include: Family Members, Friends/Neighbors   Current Financial resources: Medicare, Medicaid  Current community resources: None  Current services prior to admission: None            Current DME:              Type of Home Care services:  OT, PT    ADLS  Prior functional level: Assistance with the following:, Mobility  Current functional level: Assistance with the following:, Mobility    PT AM-PAC: 17 /24  OT AM-PAC: 15 /24    Family can provide assistance at DC: Yes  Would you like Case Management to discuss the discharge plan with any other family members/significant others, and if so, who?  Yes  Plans to Return to Present Housing: Yes  Other Identified Issues/Barriers to RETURNING to current housing:   Potential Assistance needed at

## 2023-09-25 NOTE — ACP (ADVANCE CARE PLANNING)
Advance Care Planning     General Advance Care Planning (ACP) Conversation    Date of Conversation: 9/23/2023  Conducted with: Patient with Decision Making Capacity   Healthcare Decision Maker: Next of Kin by law (only applies in absence of above) (name) Yoli Gant (father)     Healthcare Decision Maker:    Primary Decision Maker: Krish Do - Other - [phone]  Click here to complete [address] including selection of the Healthcare Decision Maker Relationship (ie \"Primary\"). Today we documented Decision Maker(s) consistent with Legal Next of Kin hierarchy.     Content/Action Overview:  Has NO ACP documents/care preferences - information provided, considering goals and options  Reviewed DNR/DNI and patient elects Full Code (Attempt Resuscitation)      Length of Voluntary ACP Conversation in minutes:  <16 minutes (Non-Billable)    Dejah Harden RN

## 2023-09-26 VITALS
SYSTOLIC BLOOD PRESSURE: 129 MMHG | RESPIRATION RATE: 16 BRPM | HEART RATE: 85 BPM | BODY MASS INDEX: 33.29 KG/M2 | HEIGHT: 64 IN | DIASTOLIC BLOOD PRESSURE: 82 MMHG | TEMPERATURE: 98.4 F | WEIGHT: 195 LBS | OXYGEN SATURATION: 97 %

## 2023-09-26 PROBLEM — G95.20 CERVICAL SPINAL CORD COMPRESSION (HCC): Status: ACTIVE | Noted: 2023-09-26

## 2023-09-26 PROCEDURE — 99233 SBSQ HOSP IP/OBS HIGH 50: CPT | Performed by: PSYCHIATRY & NEUROLOGY

## 2023-09-26 PROCEDURE — 97535 SELF CARE MNGMENT TRAINING: CPT

## 2023-09-26 PROCEDURE — G0378 HOSPITAL OBSERVATION PER HR: HCPCS

## 2023-09-26 PROCEDURE — 6370000000 HC RX 637 (ALT 250 FOR IP): Performed by: INTERNAL MEDICINE

## 2023-09-26 PROCEDURE — 97530 THERAPEUTIC ACTIVITIES: CPT

## 2023-09-26 PROCEDURE — 1200000000 HC SEMI PRIVATE

## 2023-09-26 PROCEDURE — APPSS45 APP SPLIT SHARED TIME 31-45 MINUTES: Performed by: NURSE PRACTITIONER

## 2023-09-26 PROCEDURE — 97110 THERAPEUTIC EXERCISES: CPT

## 2023-09-26 PROCEDURE — 97116 GAIT TRAINING THERAPY: CPT

## 2023-09-26 PROCEDURE — 6370000000 HC RX 637 (ALT 250 FOR IP): Performed by: NURSE PRACTITIONER

## 2023-09-26 PROCEDURE — 6360000002 HC RX W HCPCS: Performed by: NURSE PRACTITIONER

## 2023-09-26 RX ADMIN — ACETAMINOPHEN 650 MG: 325 TABLET ORAL at 00:10

## 2023-09-26 RX ADMIN — HYDROCODONE BITARTRATE AND ACETAMINOPHEN 1 TABLET: 5; 325 TABLET ORAL at 18:18

## 2023-09-26 RX ADMIN — LAMOTRIGINE 100 MG: 100 TABLET ORAL at 09:53

## 2023-09-26 RX ADMIN — OLANZAPINE 20 MG: 5 TABLET, FILM COATED ORAL at 20:07

## 2023-09-26 RX ADMIN — ONDANSETRON 4 MG: 4 TABLET, ORALLY DISINTEGRATING ORAL at 20:18

## 2023-09-26 RX ADMIN — MILNACIPRAN HYDROCHLORIDE 12.5 MG: 12.5 TABLET, FILM COATED ORAL at 20:18

## 2023-09-26 RX ADMIN — PRAZOSIN HYDROCHLORIDE 1 MG: 1 CAPSULE ORAL at 20:07

## 2023-09-26 RX ADMIN — POLYETHYLENE GLYCOL 3350 17 G: 17 POWDER, FOR SOLUTION ORAL at 00:10

## 2023-09-26 RX ADMIN — ENOXAPARIN SODIUM 40 MG: 100 INJECTION SUBCUTANEOUS at 09:53

## 2023-09-26 RX ADMIN — LAMOTRIGINE 100 MG: 100 TABLET ORAL at 20:07

## 2023-09-26 RX ADMIN — GABAPENTIN 800 MG: 400 CAPSULE ORAL at 09:52

## 2023-09-26 RX ADMIN — HYDROCODONE BITARTRATE AND ACETAMINOPHEN 1 TABLET: 5; 325 TABLET ORAL at 05:19

## 2023-09-26 RX ADMIN — HYDROCODONE BITARTRATE AND ACETAMINOPHEN 1 TABLET: 5; 325 TABLET ORAL at 11:41

## 2023-09-26 RX ADMIN — ATORVASTATIN CALCIUM 80 MG: 80 TABLET, FILM COATED ORAL at 20:07

## 2023-09-26 RX ADMIN — GABAPENTIN 800 MG: 400 CAPSULE ORAL at 15:00

## 2023-09-26 RX ADMIN — ACETAMINOPHEN 650 MG: 325 TABLET ORAL at 20:07

## 2023-09-26 RX ADMIN — ASPIRIN 81 MG 81 MG: 81 TABLET ORAL at 09:52

## 2023-09-26 RX ADMIN — SERTRALINE 100 MG: 50 TABLET, FILM COATED ORAL at 09:52

## 2023-09-26 RX ADMIN — GABAPENTIN 800 MG: 400 CAPSULE ORAL at 20:07

## 2023-09-26 RX ADMIN — BUPROPION HYDROCHLORIDE 75 MG: 75 TABLET, FILM COATED ORAL at 09:52

## 2023-09-26 RX ADMIN — MILNACIPRAN HYDROCHLORIDE 12.5 MG: 12.5 TABLET, FILM COATED ORAL at 10:43

## 2023-09-26 ASSESSMENT — PAIN DESCRIPTION - LOCATION
LOCATION: BACK
LOCATION: GENERALIZED

## 2023-09-26 ASSESSMENT — PAIN SCALES - GENERAL
PAINLEVEL_OUTOF10: 9
PAINLEVEL_OUTOF10: 6
PAINLEVEL_OUTOF10: 10
PAINLEVEL_OUTOF10: 9
PAINLEVEL_OUTOF10: 10

## 2023-09-26 ASSESSMENT — PAIN DESCRIPTION - ORIENTATION: ORIENTATION: RIGHT

## 2023-09-26 NOTE — CONSULTS
Department of Neurosurgery  Attending Consult Note        Reason for Consult:  Weakness    CHIEF COMPLAINT:  Weakness    HISTORY OF PRESENT ILLNESS:                The patient is a 65 y.o. y.o. female who presents with over a month of severe right greater than left sided weakness, numbness, and tingling. She states that two months ago she had to start using a walker. She underwent MRI of the cervical spine demonstrated severe compression at C6-C7 from a disc herniation. Of note she takes a daily aspirin. She has a past medical history of schizophrenia  and bipolar disorder.      Past Medical History:        Diagnosis Date    Asthma     Bipolar disorder     Carpal tunnel syndrome     COPD (chronic obstructive pulmonary disease) (HCC)     Fibromyalgia     GERD (gastroesophageal reflux disease)     Hyperlipidemia     Irritable bowel syndrome     Manic depression ([facility])     PONV (postoperative nausea and vomiting)     PTSD (post-traumatic stress disorder)     Schizophrenia      Past Surgical History:        Procedure Laterality Date    ANKLE FRACTURE SURGERY Left 2/12/2021    LEFT ANKLE OPEN REDUCTION INTERNAL FIXATION   performed by Sandra Robbins DO at [address] [place] [address]      DISCECTOMY L3    BREAST BIOPSY Right     LUMPECTOMY, BENIGN    CARPAL TUNNEL RELEASE Bilateral     MULTIPLE    HIP SURGERY Left     BONE SPUR    KNEE ARTHROCENTESIS Left 10/27/2017    left knee medial compartment arthroplasty    KNEE ARTHROPLASTY Right 12/09/2016    RIGHT PARTIAL KNEE ARTHROPLASTY    KNEE ARTHROPLASTY Right 02/17/2017    right Knee open medial compartment arthroplasty evaluation, irrigation and debridement     KNEE ARTHROSCOPY Left     OVARIAN CYST SURGERY      right     TONSILLECTOMY AND ADENOIDECTOMY      TUBAL LIGATION      R TUBELECTOMY      Current Medications:   Current Facility-Administered Medications: HYDROcodone-acetaminophen (NORCO) 5-325 MG per tablet 1 tablet, 1 tablet, Oral, Q6H

## 2023-09-26 NOTE — CARE COORDINATION
Chart reviewed day 3. Care provided by neuro, neuro surgery and IM. Pt is from home with her niece but has had multiple falls recently. She now has a rollater walker with seat. She had an MRI that shows large cervical disc herniation. Pt may be transferred to [facility] for management. Will continue to follow course for needs.  Macey Beltran RN

## 2023-09-26 NOTE — CONSULTS
Consult Placed     Who: RUPERT SARAH  Date:9/26/2023  SEFP:[id number]     Electronically signed by Doris Morrison on 9/26/2023 at 12:23 PM

## 2023-09-26 NOTE — DISCHARGE SUMMARY
administration of intravenous contrast. Automated exposure control, iterative reconstruction, and/or weight based adjustment of the mA/kV was utilized to reduce the radiation dose to as low as reasonably achievable. COMPARISON: None. HISTORY: ORDERING SYSTEM PROVIDED HISTORY: rt sided weakness FINDINGS: BRAIN/VENTRICLES: There is no acute intracranial hemorrhage, mass effect or midline shift. No abnormal extra-axial fluid collection. The gray-white differentiation is maintained without evidence of an acute infarct. There is no evidence of hydrocephalus. ORBITS: The visualized portion of the orbits demonstrate no acute abnormality. SINUSES: The visualized paranasal sinuses and mastoid air cells demonstrate no acute abnormality. SOFT TISSUES/SKULL:  No acute abnormality of the visualized skull or soft tissues. No acute intracranial abnormality.        CBC:   Recent Labs     09/23/23 2320 09/24/23  0556   WBC 9.9 9.0   HGB 11.3* 11.7*    292     BMP:    Recent Labs     09/23/23 2320 09/24/23  0556 09/25/23  0641    143 139   K 3.6 3.4* 4.7    104 103   CO2 28 28 26   BUN 16 14 12   CREATININE 1.1 1.0 0.7   GLUCOSE 155* 161* 109*     Hepatic:   Recent Labs     09/23/23 2320   AST 13*   ALT 13   BILITOT <0.2   ALKPHOS 77     Lipids:   Lab Results   Component Value Date/Time    CHOL 203 03/18/2022 06:35 AM    HDL 39 03/18/2022 06:35 AM    TRIG 356 03/18/2022 06:35 AM     Hemoglobin A1C:   Lab Results   Component Value Date/Time    LABA1C 6.1 08/11/2022 10:18 AM     TSH:   Lab Results   Component Value Date/Time    TSH 0.82 08/19/2010 02:20 PM       UA:  Lab Results   Component Value Date/Time    NITRU Negative 09/24/2023 01:21 AM    COLORU Yellow 09/24/2023 01:21 AM    PHUR 5.5 09/24/2023 01:21 AM    PHUR 6.0 09/24/2023 01:21 AM    WBCUA 10-20 09/24/2023 01:21 AM    RBCUA 5-10 09/24/2023 01:21 AM    MUCUS 1+ 09/24/2023 01:21 AM    BACTERIA 1+ 09/24/2023 01:21 AM    CLARITYU Clear 09/24/2023

## 2023-09-27 ENCOUNTER — HOSPITAL ENCOUNTER (INPATIENT)
Age: 58
LOS: 1 days | Discharge: HOME | End: 2023-09-28
Attending: STUDENT IN AN ORGANIZED HEALTH CARE EDUCATION/TRAINING PROGRAM | Admitting: STUDENT IN AN ORGANIZED HEALTH CARE EDUCATION/TRAINING PROGRAM
Payer: MEDICARE

## 2023-09-27 ENCOUNTER — APPOINTMENT (OUTPATIENT)
Dept: GENERAL RADIOLOGY | Age: 58
End: 2023-09-27
Attending: STUDENT IN AN ORGANIZED HEALTH CARE EDUCATION/TRAINING PROGRAM
Payer: MEDICARE

## 2023-09-27 ENCOUNTER — ANESTHESIA (OUTPATIENT)
Dept: OPERATING ROOM | Age: 58
End: 2023-09-27
Payer: MEDICARE

## 2023-09-27 ENCOUNTER — ANESTHESIA EVENT (OUTPATIENT)
Dept: OPERATING ROOM | Age: 58
End: 2023-09-27
Payer: MEDICARE

## 2023-09-27 DIAGNOSIS — Z98.1 S/P CERVICAL SPINAL FUSION: Primary | ICD-10-CM

## 2023-09-27 PROBLEM — M50.30 DEGENERATIVE DISC DISEASE, CERVICAL: Status: ACTIVE | Noted: 2023-09-27

## 2023-09-27 LAB
ABO + RH BLD: NORMAL
ALBUMIN SERPL-MCNC: 3.9 G/DL (ref 3.4–5)
ALBUMIN/GLOB SERPL: 1.5 {RATIO} (ref 1.1–2.2)
ALP SERPL-CCNC: 85 U/L (ref 40–129)
ALT SERPL-CCNC: 14 U/L (ref 10–40)
ANION GAP SERPL CALCULATED.3IONS-SCNC: 11 MMOL/L (ref 3–16)
AST SERPL-CCNC: 17 U/L (ref 15–37)
BILIRUB SERPL-MCNC: 0.3 MG/DL (ref 0–1)
BLD GP AB SCN SERPL QL: NORMAL
BUN SERPL-MCNC: 14 MG/DL (ref 7–20)
CALCIUM SERPL-MCNC: 10 MG/DL (ref 8.3–10.6)
CHLORIDE SERPL-SCNC: 104 MMOL/L (ref 99–110)
CO2 SERPL-SCNC: 27 MMOL/L (ref 21–32)
CREAT SERPL-MCNC: 0.9 MG/DL (ref 0.6–1.1)
GFR SERPLBLD CREATININE-BSD FMLA CKD-EPI: >60 ML/MIN/{1.73_M2}
GLUCOSE SERPL-MCNC: 108 MG/DL (ref 70–99)
POTASSIUM SERPL-SCNC: 4.5 MMOL/L (ref 3.5–5.1)
PROT SERPL-MCNC: 6.5 G/DL (ref 6.4–8.2)
SODIUM SERPL-SCNC: 142 MMOL/L (ref 136–145)

## 2023-09-27 PROCEDURE — 2580000003 HC RX 258: Performed by: STUDENT IN AN ORGANIZED HEALTH CARE EDUCATION/TRAINING PROGRAM

## 2023-09-27 PROCEDURE — 7100000001 HC PACU RECOVERY - ADDTL 15 MIN: Performed by: STUDENT IN AN ORGANIZED HEALTH CARE EDUCATION/TRAINING PROGRAM

## 2023-09-27 PROCEDURE — 86900 BLOOD TYPING SEROLOGIC ABO: CPT

## 2023-09-27 PROCEDURE — 99223 1ST HOSP IP/OBS HIGH 75: CPT | Performed by: FAMILY MEDICINE

## 2023-09-27 PROCEDURE — 2580000003 HC RX 258: Performed by: FAMILY MEDICINE

## 2023-09-27 PROCEDURE — 0RB30ZZ EXCISION OF CERVICAL VERTEBRAL DISC, OPEN APPROACH: ICD-10-PCS | Performed by: STUDENT IN AN ORGANIZED HEALTH CARE EDUCATION/TRAINING PROGRAM

## 2023-09-27 PROCEDURE — 1200000000 HC SEMI PRIVATE

## 2023-09-27 PROCEDURE — 86850 RBC ANTIBODY SCREEN: CPT

## 2023-09-27 PROCEDURE — 72040 X-RAY EXAM NECK SPINE 2-3 VW: CPT

## 2023-09-27 PROCEDURE — 7100000000 HC PACU RECOVERY - FIRST 15 MIN: Performed by: STUDENT IN AN ORGANIZED HEALTH CARE EDUCATION/TRAINING PROGRAM

## 2023-09-27 PROCEDURE — A4217 STERILE WATER/SALINE, 500 ML: HCPCS | Performed by: STUDENT IN AN ORGANIZED HEALTH CARE EDUCATION/TRAINING PROGRAM

## 2023-09-27 PROCEDURE — 6360000002 HC RX W HCPCS: Performed by: FAMILY MEDICINE

## 2023-09-27 PROCEDURE — 2720000010 HC SURG SUPPLY STERILE: Performed by: STUDENT IN AN ORGANIZED HEALTH CARE EDUCATION/TRAINING PROGRAM

## 2023-09-27 PROCEDURE — 2580000003 HC RX 258

## 2023-09-27 PROCEDURE — 00NW0ZZ RELEASE CERVICAL SPINAL CORD, OPEN APPROACH: ICD-10-PCS | Performed by: STUDENT IN AN ORGANIZED HEALTH CARE EDUCATION/TRAINING PROGRAM

## 2023-09-27 PROCEDURE — C1762 CONN TISS, HUMAN(INC FASCIA): HCPCS | Performed by: STUDENT IN AN ORGANIZED HEALTH CARE EDUCATION/TRAINING PROGRAM

## 2023-09-27 PROCEDURE — 3700000000 HC ANESTHESIA ATTENDED CARE: Performed by: STUDENT IN AN ORGANIZED HEALTH CARE EDUCATION/TRAINING PROGRAM

## 2023-09-27 PROCEDURE — 86901 BLOOD TYPING SEROLOGIC RH(D): CPT

## 2023-09-27 PROCEDURE — 2500000003 HC RX 250 WO HCPCS

## 2023-09-27 PROCEDURE — 94761 N-INVAS EAR/PLS OXIMETRY MLT: CPT

## 2023-09-27 PROCEDURE — 36415 COLL VENOUS BLD VENIPUNCTURE: CPT

## 2023-09-27 PROCEDURE — C1776 JOINT DEVICE (IMPLANTABLE): HCPCS | Performed by: STUDENT IN AN ORGANIZED HEALTH CARE EDUCATION/TRAINING PROGRAM

## 2023-09-27 PROCEDURE — 6360000002 HC RX W HCPCS: Performed by: STUDENT IN AN ORGANIZED HEALTH CARE EDUCATION/TRAINING PROGRAM

## 2023-09-27 PROCEDURE — 2709999900 HC NON-CHARGEABLE SUPPLY: Performed by: STUDENT IN AN ORGANIZED HEALTH CARE EDUCATION/TRAINING PROGRAM

## 2023-09-27 PROCEDURE — 6360000002 HC RX W HCPCS

## 2023-09-27 PROCEDURE — 3700000001 HC ADD 15 MINUTES (ANESTHESIA): Performed by: STUDENT IN AN ORGANIZED HEALTH CARE EDUCATION/TRAINING PROGRAM

## 2023-09-27 PROCEDURE — 93005 ELECTROCARDIOGRAM TRACING: CPT | Performed by: NURSE PRACTITIONER

## 2023-09-27 PROCEDURE — 0RG10A0 FUSION OF CERVICAL VERTEBRAL JOINT WITH INTERBODY FUSION DEVICE, ANTERIOR APPROACH, ANTERIOR COLUMN, OPEN APPROACH: ICD-10-PCS | Performed by: STUDENT IN AN ORGANIZED HEALTH CARE EDUCATION/TRAINING PROGRAM

## 2023-09-27 PROCEDURE — 6370000000 HC RX 637 (ALT 250 FOR IP): Performed by: STUDENT IN AN ORGANIZED HEALTH CARE EDUCATION/TRAINING PROGRAM

## 2023-09-27 PROCEDURE — 3600000014 HC SURGERY LEVEL 4 ADDTL 15MIN: Performed by: STUDENT IN AN ORGANIZED HEALTH CARE EDUCATION/TRAINING PROGRAM

## 2023-09-27 PROCEDURE — 3600000004 HC SURGERY LEVEL 4 BASE: Performed by: STUDENT IN AN ORGANIZED HEALTH CARE EDUCATION/TRAINING PROGRAM

## 2023-09-27 PROCEDURE — 6360000002 HC RX W HCPCS: Performed by: ANESTHESIOLOGY

## 2023-09-27 PROCEDURE — 80053 COMPREHEN METABOLIC PANEL: CPT

## 2023-09-27 PROCEDURE — 2700000000 HC OXYGEN THERAPY PER DAY

## 2023-09-27 PROCEDURE — 2500000003 HC RX 250 WO HCPCS: Performed by: STUDENT IN AN ORGANIZED HEALTH CARE EDUCATION/TRAINING PROGRAM

## 2023-09-27 PROCEDURE — 6370000000 HC RX 637 (ALT 250 FOR IP): Performed by: FAMILY MEDICINE

## 2023-09-27 DEVICE — SCREW SPNL L14MM DIA4MM ANT CERV TI SELF DRL CONSTRN: Type: IMPLANTABLE DEVICE | Site: SPINE CERVICAL | Status: FUNCTIONAL

## 2023-09-27 DEVICE — IMPLANTABLE DEVICE: Type: IMPLANTABLE DEVICE | Site: SPINE CERVICAL | Status: FUNCTIONAL

## 2023-09-27 DEVICE — ALLOGRAFT BNE 5 CC FIBER PLIAFX PRIM: Type: IMPLANTABLE DEVICE | Site: SPINE CERVICAL | Status: FUNCTIONAL

## 2023-09-27 DEVICE — SCREW SPNL L14MM DIA4MM ANT CERV TI SELF DRL VAR ANG FULL: Type: IMPLANTABLE DEVICE | Site: SPINE CERVICAL | Status: FUNCTIONAL

## 2023-09-27 RX ORDER — SODIUM CHLORIDE 9 MG/ML
INJECTION, SOLUTION INTRAVENOUS CONTINUOUS
Status: DISCONTINUED | OUTPATIENT
Start: 2023-09-27 | End: 2023-09-28 | Stop reason: HOSPADM

## 2023-09-27 RX ORDER — SODIUM CHLORIDE 9 MG/ML
INJECTION, SOLUTION INTRAVENOUS PRN
Status: DISCONTINUED | OUTPATIENT
Start: 2023-09-27 | End: 2023-09-28 | Stop reason: HOSPADM

## 2023-09-27 RX ORDER — OLANZAPINE 5 MG/1
20 TABLET ORAL NIGHTLY
Status: DISCONTINUED | OUTPATIENT
Start: 2023-09-27 | End: 2023-09-28 | Stop reason: HOSPADM

## 2023-09-27 RX ORDER — OXYCODONE HYDROCHLORIDE 5 MG/1
5 TABLET ORAL EVERY 4 HOURS PRN
Status: DISCONTINUED | OUTPATIENT
Start: 2023-09-27 | End: 2023-09-28 | Stop reason: HOSPADM

## 2023-09-27 RX ORDER — LORAZEPAM 2 MG/ML
0.5 INJECTION INTRAMUSCULAR
Status: DISCONTINUED | OUTPATIENT
Start: 2023-09-27 | End: 2023-09-27 | Stop reason: HOSPADM

## 2023-09-27 RX ORDER — SODIUM CHLORIDE 0.9 % (FLUSH) 0.9 %
5-40 SYRINGE (ML) INJECTION PRN
Status: DISCONTINUED | OUTPATIENT
Start: 2023-09-27 | End: 2023-09-28 | Stop reason: HOSPADM

## 2023-09-27 RX ORDER — METHOCARBAMOL 100 MG/ML
INJECTION, SOLUTION INTRAMUSCULAR; INTRAVENOUS PRN
Status: DISCONTINUED | OUTPATIENT
Start: 2023-09-27 | End: 2023-09-27 | Stop reason: SDUPTHER

## 2023-09-27 RX ORDER — CYCLOBENZAPRINE HCL 10 MG
10 TABLET ORAL EVERY 12 HOURS PRN
Status: DISCONTINUED | OUTPATIENT
Start: 2023-09-27 | End: 2023-09-28 | Stop reason: HOSPADM

## 2023-09-27 RX ORDER — ONDANSETRON 2 MG/ML
INJECTION INTRAMUSCULAR; INTRAVENOUS PRN
Status: DISCONTINUED | OUTPATIENT
Start: 2023-09-27 | End: 2023-09-27 | Stop reason: SDUPTHER

## 2023-09-27 RX ORDER — ACETAMINOPHEN 325 MG/1
650 TABLET ORAL EVERY 6 HOURS
Status: DISCONTINUED | OUTPATIENT
Start: 2023-09-27 | End: 2023-09-28 | Stop reason: HOSPADM

## 2023-09-27 RX ORDER — DEXAMETHASONE SODIUM PHOSPHATE 4 MG/ML
INJECTION, SOLUTION INTRA-ARTICULAR; INTRALESIONAL; INTRAMUSCULAR; INTRAVENOUS; SOFT TISSUE PRN
Status: DISCONTINUED | OUTPATIENT
Start: 2023-09-27 | End: 2023-09-27 | Stop reason: SDUPTHER

## 2023-09-27 RX ORDER — MEPERIDINE HYDROCHLORIDE 25 MG/ML
12.5 INJECTION INTRAMUSCULAR; INTRAVENOUS; SUBCUTANEOUS EVERY 5 MIN PRN
Status: DISCONTINUED | OUTPATIENT
Start: 2023-09-27 | End: 2023-09-27 | Stop reason: HOSPADM

## 2023-09-27 RX ORDER — KETAMINE HCL IN NACL, ISO-OSM 20 MG/2 ML
SYRINGE (ML) INJECTION PRN
Status: DISCONTINUED | OUTPATIENT
Start: 2023-09-27 | End: 2023-09-27 | Stop reason: SDUPTHER

## 2023-09-27 RX ORDER — SUCCINYLCHOLINE/SOD CL,ISO/PF 200MG/10ML
SYRINGE (ML) INTRAVENOUS PRN
Status: DISCONTINUED | OUTPATIENT
Start: 2023-09-27 | End: 2023-09-27 | Stop reason: SDUPTHER

## 2023-09-27 RX ORDER — ENOXAPARIN SODIUM 100 MG/ML
40 INJECTION SUBCUTANEOUS DAILY
Status: DISCONTINUED | OUTPATIENT
Start: 2023-09-27 | End: 2023-09-27 | Stop reason: SDUPTHER

## 2023-09-27 RX ORDER — FENTANYL CITRATE 50 UG/ML
25 INJECTION, SOLUTION INTRAMUSCULAR; INTRAVENOUS EVERY 5 MIN PRN
Status: COMPLETED | OUTPATIENT
Start: 2023-09-27 | End: 2023-09-27

## 2023-09-27 RX ORDER — FENTANYL CITRATE 50 UG/ML
INJECTION, SOLUTION INTRAMUSCULAR; INTRAVENOUS PRN
Status: DISCONTINUED | OUTPATIENT
Start: 2023-09-27 | End: 2023-09-27 | Stop reason: SDUPTHER

## 2023-09-27 RX ORDER — SODIUM CHLORIDE 9 MG/ML
INJECTION, SOLUTION INTRAVENOUS PRN
Status: DISCONTINUED | OUTPATIENT
Start: 2023-09-27 | End: 2023-09-27 | Stop reason: HOSPADM

## 2023-09-27 RX ORDER — LAMOTRIGINE 100 MG/1
100 TABLET ORAL 2 TIMES DAILY
Status: DISCONTINUED | OUTPATIENT
Start: 2023-09-27 | End: 2023-09-28 | Stop reason: HOSPADM

## 2023-09-27 RX ORDER — HYDROMORPHONE HYDROCHLORIDE 1 MG/ML
0.5 INJECTION, SOLUTION INTRAMUSCULAR; INTRAVENOUS; SUBCUTANEOUS
Status: DISCONTINUED | OUTPATIENT
Start: 2023-09-27 | End: 2023-09-28 | Stop reason: HOSPADM

## 2023-09-27 RX ORDER — KETOROLAC TROMETHAMINE 30 MG/ML
30 INJECTION, SOLUTION INTRAMUSCULAR; INTRAVENOUS EVERY 6 HOURS PRN
Status: DISCONTINUED | OUTPATIENT
Start: 2023-09-27 | End: 2023-09-27

## 2023-09-27 RX ORDER — IPRATROPIUM BROMIDE AND ALBUTEROL SULFATE 2.5; .5 MG/3ML; MG/3ML
1 SOLUTION RESPIRATORY (INHALATION)
Status: DISCONTINUED | OUTPATIENT
Start: 2023-09-27 | End: 2023-09-27 | Stop reason: HOSPADM

## 2023-09-27 RX ORDER — SODIUM CHLORIDE 0.9 % (FLUSH) 0.9 %
5-40 SYRINGE (ML) INJECTION PRN
Status: DISCONTINUED | OUTPATIENT
Start: 2023-09-27 | End: 2023-09-27 | Stop reason: HOSPADM

## 2023-09-27 RX ORDER — BUPROPION HYDROCHLORIDE 75 MG/1
75 TABLET ORAL DAILY
Status: DISCONTINUED | OUTPATIENT
Start: 2023-09-27 | End: 2023-09-28 | Stop reason: HOSPADM

## 2023-09-27 RX ORDER — ALBUTEROL SULFATE 90 UG/1
2 AEROSOL, METERED RESPIRATORY (INHALATION) EVERY 4 HOURS PRN
Status: DISCONTINUED | OUTPATIENT
Start: 2023-09-27 | End: 2023-09-28 | Stop reason: HOSPADM

## 2023-09-27 RX ORDER — PHENYLEPHRINE HYDROCHLORIDE 10 MG/ML
INJECTION INTRAVENOUS PRN
Status: DISCONTINUED | OUTPATIENT
Start: 2023-09-27 | End: 2023-09-27 | Stop reason: SDUPTHER

## 2023-09-27 RX ORDER — POLYETHYLENE GLYCOL 3350 17 G/17G
17 POWDER, FOR SOLUTION ORAL DAILY PRN
Status: DISCONTINUED | OUTPATIENT
Start: 2023-09-27 | End: 2023-09-28 | Stop reason: HOSPADM

## 2023-09-27 RX ORDER — OXYCODONE HYDROCHLORIDE 5 MG/1
10 TABLET ORAL EVERY 4 HOURS PRN
Status: DISCONTINUED | OUTPATIENT
Start: 2023-09-27 | End: 2023-09-28 | Stop reason: HOSPADM

## 2023-09-27 RX ORDER — ONDANSETRON 2 MG/ML
4 INJECTION INTRAMUSCULAR; INTRAVENOUS
Status: DISCONTINUED | OUTPATIENT
Start: 2023-09-27 | End: 2023-09-27 | Stop reason: HOSPADM

## 2023-09-27 RX ORDER — CYCLOBENZAPRINE HCL 10 MG
10 TABLET ORAL 3 TIMES DAILY PRN
Status: DISCONTINUED | OUTPATIENT
Start: 2023-09-27 | End: 2023-09-27 | Stop reason: SDUPTHER

## 2023-09-27 RX ORDER — SERTRALINE HYDROCHLORIDE 100 MG/1
100 TABLET, FILM COATED ORAL DAILY
Status: DISCONTINUED | OUTPATIENT
Start: 2023-09-27 | End: 2023-09-28 | Stop reason: HOSPADM

## 2023-09-27 RX ORDER — ONDANSETRON 2 MG/ML
4 INJECTION INTRAMUSCULAR; INTRAVENOUS EVERY 6 HOURS PRN
Status: DISCONTINUED | OUTPATIENT
Start: 2023-09-27 | End: 2023-09-28 | Stop reason: HOSPADM

## 2023-09-27 RX ORDER — HYDROMORPHONE HYDROCHLORIDE 1 MG/ML
0.5 INJECTION, SOLUTION INTRAMUSCULAR; INTRAVENOUS; SUBCUTANEOUS EVERY 5 MIN PRN
Status: DISCONTINUED | OUTPATIENT
Start: 2023-09-27 | End: 2023-09-27 | Stop reason: HOSPADM

## 2023-09-27 RX ORDER — ACETAMINOPHEN 325 MG/1
650 TABLET ORAL EVERY 6 HOURS PRN
Status: DISCONTINUED | OUTPATIENT
Start: 2023-09-27 | End: 2023-09-28 | Stop reason: HOSPADM

## 2023-09-27 RX ORDER — HYDRALAZINE HYDROCHLORIDE 20 MG/ML
10 INJECTION INTRAMUSCULAR; INTRAVENOUS EVERY 6 HOURS PRN
Status: DISCONTINUED | OUTPATIENT
Start: 2023-09-27 | End: 2023-09-28 | Stop reason: HOSPADM

## 2023-09-27 RX ORDER — ONDANSETRON 2 MG/ML
4 INJECTION INTRAMUSCULAR; INTRAVENOUS EVERY 6 HOURS PRN
Status: DISCONTINUED | OUTPATIENT
Start: 2023-09-27 | End: 2023-09-27 | Stop reason: SDUPTHER

## 2023-09-27 RX ORDER — POLYETHYLENE GLYCOL 3350 17 G/17G
17 POWDER, FOR SOLUTION ORAL DAILY
Status: DISCONTINUED | OUTPATIENT
Start: 2023-09-27 | End: 2023-09-28 | Stop reason: HOSPADM

## 2023-09-27 RX ORDER — HYDROMORPHONE HYDROCHLORIDE 1 MG/ML
0.5 INJECTION, SOLUTION INTRAMUSCULAR; INTRAVENOUS; SUBCUTANEOUS ONCE
Status: COMPLETED | OUTPATIENT
Start: 2023-09-27 | End: 2023-09-27

## 2023-09-27 RX ORDER — ACETAMINOPHEN 325 MG/1
650 TABLET ORAL
Status: DISCONTINUED | OUTPATIENT
Start: 2023-09-27 | End: 2023-09-27 | Stop reason: HOSPADM

## 2023-09-27 RX ORDER — LIDOCAINE HYDROCHLORIDE 20 MG/ML
INJECTION, SOLUTION INTRAVENOUS PRN
Status: DISCONTINUED | OUTPATIENT
Start: 2023-09-27 | End: 2023-09-27 | Stop reason: SDUPTHER

## 2023-09-27 RX ORDER — LABETALOL HYDROCHLORIDE 5 MG/ML
10 INJECTION, SOLUTION INTRAVENOUS
Status: DISCONTINUED | OUTPATIENT
Start: 2023-09-27 | End: 2023-09-27 | Stop reason: HOSPADM

## 2023-09-27 RX ORDER — ACETAMINOPHEN 650 MG/1
650 SUPPOSITORY RECTAL EVERY 6 HOURS PRN
Status: DISCONTINUED | OUTPATIENT
Start: 2023-09-27 | End: 2023-09-28 | Stop reason: HOSPADM

## 2023-09-27 RX ORDER — SODIUM CHLORIDE 0.9 % (FLUSH) 0.9 %
5-40 SYRINGE (ML) INJECTION EVERY 12 HOURS SCHEDULED
Status: DISCONTINUED | OUTPATIENT
Start: 2023-09-27 | End: 2023-09-28 | Stop reason: HOSPADM

## 2023-09-27 RX ORDER — CEFAZOLIN SODIUM 2 G/50ML
SOLUTION INTRAVENOUS PRN
Status: DISCONTINUED | OUTPATIENT
Start: 2023-09-27 | End: 2023-09-27 | Stop reason: SDUPTHER

## 2023-09-27 RX ORDER — ONDANSETRON 4 MG/1
4 TABLET, ORALLY DISINTEGRATING ORAL EVERY 8 HOURS PRN
Status: DISCONTINUED | OUTPATIENT
Start: 2023-09-27 | End: 2023-09-27 | Stop reason: SDUPTHER

## 2023-09-27 RX ORDER — SODIUM CHLORIDE 0.9 % (FLUSH) 0.9 %
5-40 SYRINGE (ML) INJECTION EVERY 12 HOURS SCHEDULED
Status: DISCONTINUED | OUTPATIENT
Start: 2023-09-27 | End: 2023-09-27 | Stop reason: HOSPADM

## 2023-09-27 RX ORDER — PROPOFOL 10 MG/ML
INJECTION, EMULSION INTRAVENOUS CONTINUOUS PRN
Status: DISCONTINUED | OUTPATIENT
Start: 2023-09-27 | End: 2023-09-27 | Stop reason: SDUPTHER

## 2023-09-27 RX ORDER — PROCHLORPERAZINE EDISYLATE 5 MG/ML
5 INJECTION INTRAMUSCULAR; INTRAVENOUS
Status: DISCONTINUED | OUTPATIENT
Start: 2023-09-27 | End: 2023-09-27 | Stop reason: HOSPADM

## 2023-09-27 RX ORDER — PROPOFOL 10 MG/ML
INJECTION, EMULSION INTRAVENOUS PRN
Status: DISCONTINUED | OUTPATIENT
Start: 2023-09-27 | End: 2023-09-27 | Stop reason: SDUPTHER

## 2023-09-27 RX ORDER — HYDROMORPHONE HYDROCHLORIDE 1 MG/ML
0.25 INJECTION, SOLUTION INTRAMUSCULAR; INTRAVENOUS; SUBCUTANEOUS
Status: DISCONTINUED | OUTPATIENT
Start: 2023-09-27 | End: 2023-09-28 | Stop reason: HOSPADM

## 2023-09-27 RX ORDER — ROCURONIUM BROMIDE 10 MG/ML
INJECTION, SOLUTION INTRAVENOUS PRN
Status: DISCONTINUED | OUTPATIENT
Start: 2023-09-27 | End: 2023-09-27 | Stop reason: SDUPTHER

## 2023-09-27 RX ORDER — ENOXAPARIN SODIUM 100 MG/ML
40 INJECTION SUBCUTANEOUS DAILY
Status: DISCONTINUED | OUTPATIENT
Start: 2023-09-29 | End: 2023-09-28 | Stop reason: HOSPADM

## 2023-09-27 RX ORDER — HYDRALAZINE HYDROCHLORIDE 20 MG/ML
10 INJECTION INTRAMUSCULAR; INTRAVENOUS ONCE
Status: COMPLETED | OUTPATIENT
Start: 2023-09-27 | End: 2023-09-27

## 2023-09-27 RX ORDER — MIDAZOLAM HYDROCHLORIDE 1 MG/ML
INJECTION INTRAMUSCULAR; INTRAVENOUS PRN
Status: DISCONTINUED | OUTPATIENT
Start: 2023-09-27 | End: 2023-09-27 | Stop reason: SDUPTHER

## 2023-09-27 RX ORDER — HYDRALAZINE HYDROCHLORIDE 20 MG/ML
INJECTION INTRAMUSCULAR; INTRAVENOUS
Status: DISPENSED
Start: 2023-09-27 | End: 2023-09-28

## 2023-09-27 RX ORDER — ONDANSETRON 4 MG/1
4 TABLET, ORALLY DISINTEGRATING ORAL EVERY 8 HOURS PRN
Status: DISCONTINUED | OUTPATIENT
Start: 2023-09-27 | End: 2023-09-28 | Stop reason: HOSPADM

## 2023-09-27 RX ORDER — REMIFENTANIL HYDROCHLORIDE 1 MG/ML
INJECTION, POWDER, LYOPHILIZED, FOR SOLUTION INTRAVENOUS CONTINUOUS PRN
Status: DISCONTINUED | OUTPATIENT
Start: 2023-09-27 | End: 2023-09-27 | Stop reason: SDUPTHER

## 2023-09-27 RX ADMIN — OLANZAPINE 20 MG: 5 TABLET, FILM COATED ORAL at 21:27

## 2023-09-27 RX ADMIN — HYDROMORPHONE HYDROCHLORIDE 0.5 MG: 1 INJECTION, SOLUTION INTRAMUSCULAR; INTRAVENOUS; SUBCUTANEOUS at 16:10

## 2023-09-27 RX ADMIN — METHOCARBAMOL 500 MG: 100 INJECTION, SOLUTION INTRAMUSCULAR; INTRAVENOUS at 12:51

## 2023-09-27 RX ADMIN — HYDROMORPHONE HYDROCHLORIDE 0.5 MG: 1 INJECTION, SOLUTION INTRAMUSCULAR; INTRAVENOUS; SUBCUTANEOUS at 02:14

## 2023-09-27 RX ADMIN — CEFAZOLIN 2000 MG: 2 INJECTION, POWDER, FOR SOLUTION INTRAMUSCULAR; INTRAVENOUS at 18:34

## 2023-09-27 RX ADMIN — Medication 120 MG: at 11:42

## 2023-09-27 RX ADMIN — DEXAMETHASONE SODIUM PHOSPHATE 10 MG: 4 INJECTION, SOLUTION INTRAMUSCULAR; INTRAVENOUS at 11:49

## 2023-09-27 RX ADMIN — KETOROLAC TROMETHAMINE 30 MG: 30 INJECTION, SOLUTION INTRAMUSCULAR; INTRAVENOUS at 08:18

## 2023-09-27 RX ADMIN — REMIFENTANIL HYDROCHLORIDE 0.1 MCG/KG/MIN: 1 INJECTION, POWDER, LYOPHILIZED, FOR SOLUTION INTRAVENOUS at 11:42

## 2023-09-27 RX ADMIN — HYDROMORPHONE HYDROCHLORIDE 0.25 MG: 1 INJECTION, SOLUTION INTRAMUSCULAR; INTRAVENOUS; SUBCUTANEOUS at 21:26

## 2023-09-27 RX ADMIN — PROPOFOL 150 MCG/KG/MIN: 10 INJECTION, EMULSION INTRAVENOUS at 11:42

## 2023-09-27 RX ADMIN — FENTANYL CITRATE 25 MCG: 50 INJECTION, SOLUTION INTRAMUSCULAR; INTRAVENOUS at 14:04

## 2023-09-27 RX ADMIN — ROCURONIUM BROMIDE 20 MG: 10 INJECTION, SOLUTION INTRAVENOUS at 12:06

## 2023-09-27 RX ADMIN — OXYCODONE HYDROCHLORIDE 5 MG: 5 TABLET ORAL at 23:18

## 2023-09-27 RX ADMIN — ACETAMINOPHEN 650 MG: 325 TABLET ORAL at 18:29

## 2023-09-27 RX ADMIN — MIDAZOLAM HYDROCHLORIDE 2 MG: 2 INJECTION, SOLUTION INTRAMUSCULAR; INTRAVENOUS at 12:53

## 2023-09-27 RX ADMIN — FENTANYL CITRATE 25 MCG: 50 INJECTION, SOLUTION INTRAMUSCULAR; INTRAVENOUS at 14:32

## 2023-09-27 RX ADMIN — SODIUM CHLORIDE: 9 INJECTION, SOLUTION INTRAVENOUS at 02:26

## 2023-09-27 RX ADMIN — OXYCODONE HYDROCHLORIDE 5 MG: 5 TABLET ORAL at 18:29

## 2023-09-27 RX ADMIN — LIDOCAINE HYDROCHLORIDE 100 MG: 20 INJECTION, SOLUTION INTRAVENOUS at 11:42

## 2023-09-27 RX ADMIN — FENTANYL CITRATE 25 MCG: 50 INJECTION, SOLUTION INTRAMUSCULAR; INTRAVENOUS at 14:18

## 2023-09-27 RX ADMIN — LAMOTRIGINE 100 MG: 100 TABLET ORAL at 02:15

## 2023-09-27 RX ADMIN — FENTANYL CITRATE 50 MCG: 50 INJECTION, SOLUTION INTRAMUSCULAR; INTRAVENOUS at 11:42

## 2023-09-27 RX ADMIN — ROCURONIUM BROMIDE 10 MG: 10 INJECTION, SOLUTION INTRAVENOUS at 11:42

## 2023-09-27 RX ADMIN — ONDANSETRON 4 MG: 2 INJECTION INTRAMUSCULAR; INTRAVENOUS at 12:59

## 2023-09-27 RX ADMIN — PHENYLEPHRINE HYDROCHLORIDE 100 MCG: 10 INJECTION INTRAVENOUS at 11:53

## 2023-09-27 RX ADMIN — SODIUM CHLORIDE, PRESERVATIVE FREE 10 ML: 5 INJECTION INTRAVENOUS at 21:27

## 2023-09-27 RX ADMIN — METHOCARBAMOL 500 MG: 100 INJECTION, SOLUTION INTRAMUSCULAR; INTRAVENOUS at 12:25

## 2023-09-27 RX ADMIN — PROPOFOL 150 MG: 10 INJECTION, EMULSION INTRAVENOUS at 11:42

## 2023-09-27 RX ADMIN — FENTANYL CITRATE 50 MCG: 50 INJECTION, SOLUTION INTRAMUSCULAR; INTRAVENOUS at 12:25

## 2023-09-27 RX ADMIN — LAMOTRIGINE 100 MG: 100 TABLET ORAL at 21:27

## 2023-09-27 RX ADMIN — SUGAMMADEX 200 MG: 100 INJECTION, SOLUTION INTRAVENOUS at 12:35

## 2023-09-27 RX ADMIN — HYDRALAZINE HYDROCHLORIDE 10 MG: 20 INJECTION, SOLUTION INTRAMUSCULAR; INTRAVENOUS at 15:47

## 2023-09-27 RX ADMIN — SODIUM CHLORIDE: 9 INJECTION, SOLUTION INTRAVENOUS at 12:25

## 2023-09-27 RX ADMIN — ACETAMINOPHEN 650 MG: 325 TABLET ORAL at 23:17

## 2023-09-27 RX ADMIN — FENTANYL CITRATE 25 MCG: 50 INJECTION, SOLUTION INTRAMUSCULAR; INTRAVENOUS at 14:12

## 2023-09-27 RX ADMIN — LAMOTRIGINE 100 MG: 100 TABLET ORAL at 08:13

## 2023-09-27 RX ADMIN — CEFAZOLIN SODIUM 2000 MG: 2 SOLUTION INTRAVENOUS at 11:48

## 2023-09-27 RX ADMIN — Medication 20 MG: at 12:51

## 2023-09-27 RX ADMIN — LABETALOL HYDROCHLORIDE 10 MG: 5 INJECTION, SOLUTION INTRAVENOUS at 14:45

## 2023-09-27 RX ADMIN — PHENYLEPHRINE HYDROCHLORIDE 25 MCG/MIN: 50 INJECTION INTRAVENOUS at 11:53

## 2023-09-27 ASSESSMENT — PAIN DESCRIPTION - FREQUENCY
FREQUENCY: CONTINUOUS

## 2023-09-27 ASSESSMENT — PAIN DESCRIPTION - ONSET
ONSET: ON-GOING

## 2023-09-27 ASSESSMENT — PAIN SCALES - GENERAL
PAINLEVEL_OUTOF10: 10
PAINLEVEL_OUTOF10: 7
PAINLEVEL_OUTOF10: 8
PAINLEVEL_OUTOF10: 8
PAINLEVEL_OUTOF10: 10
PAINLEVEL_OUTOF10: 3
PAINLEVEL_OUTOF10: 3
PAINLEVEL_OUTOF10: 6
PAINLEVEL_OUTOF10: 9
PAINLEVEL_OUTOF10: 10
PAINLEVEL_OUTOF10: 10
PAINLEVEL_OUTOF10: 7
PAINLEVEL_OUTOF10: 8
PAINLEVEL_OUTOF10: 3

## 2023-09-27 ASSESSMENT — PAIN DESCRIPTION - DESCRIPTORS
DESCRIPTORS: ACHING
DESCRIPTORS: ACHING
DESCRIPTORS: ACHING;DISCOMFORT
DESCRIPTORS: SHARP
DESCRIPTORS: DISCOMFORT
DESCRIPTORS: SHARP
DESCRIPTORS: NUMBNESS

## 2023-09-27 ASSESSMENT — PAIN DESCRIPTION - PAIN TYPE
TYPE: SURGICAL PAIN
TYPE: ACUTE PAIN
TYPE: SURGICAL PAIN
TYPE: ACUTE PAIN
TYPE: SURGICAL PAIN

## 2023-09-27 ASSESSMENT — PAIN - FUNCTIONAL ASSESSMENT
PAIN_FUNCTIONAL_ASSESSMENT: 0-10
PAIN_FUNCTIONAL_ASSESSMENT: ACTIVITIES ARE NOT PREVENTED
PAIN_FUNCTIONAL_ASSESSMENT: PREVENTS OR INTERFERES SOME ACTIVE ACTIVITIES AND ADLS
PAIN_FUNCTIONAL_ASSESSMENT: ACTIVITIES ARE NOT PREVENTED
PAIN_FUNCTIONAL_ASSESSMENT: PREVENTS OR INTERFERES SOME ACTIVE ACTIVITIES AND ADLS

## 2023-09-27 ASSESSMENT — PAIN DESCRIPTION - LOCATION
LOCATION: LEG;ARM
LOCATION: NECK
LOCATION: LEG;ARM
LOCATION: NECK
LOCATION: NECK;ARM;SHOULDER

## 2023-09-27 ASSESSMENT — PAIN DESCRIPTION - ORIENTATION
ORIENTATION: MID
ORIENTATION: RIGHT
ORIENTATION: MID
ORIENTATION: RIGHT
ORIENTATION: ANTERIOR;POSTERIOR;RIGHT
ORIENTATION: RIGHT
ORIENTATION: RIGHT

## 2023-09-27 NOTE — PROGRESS NOTES
Preop checklist completed and in chart, anesthesia consent signed, surgical consent not signed, see previous note, gown on nothing underneath, no jewelry, no dentures, NPO since 9/27/23 @ 0000.  2 Working 20g IVs in L and R.

## 2023-09-27 NOTE — PROGRESS NOTES
Transport arrived to room to take pt to PACU. Performing surgeon has not been to bedside to sign consent, transport aware.

## 2023-09-27 NOTE — OP NOTE
[facility]  Neurosurgery  Operative Note    Date of Operation:  9/27/2023    Preoperative Diagnosis:  1. Cervical Disc herniation at C6-C7 with severe cord compression and myelopathy    Postoperative Diagnosis:  Same    Procedure(s) Performed:  1. Anterior cervical discectomy for decompression at C6/7  2. Interbody fusion of C6 to C7 utilizing interbody cage  3. Anterior cervical plating of C6-7  4. Application of morselized allograft bone matrix  5. Microsurgical dissection and decompression using the operating microscope    Neurosurgeon:  Farheen Schumacher MD    Co-surgeon:  None    Surgical Assistant:  on staff SA    Anesthesia:  General endotracheal    Complications:  None    Specimens:  None    Implants Placed:  1. ACIS PEEK cage: 7 mm at C6/7  2. [place] Plate: 12 mm at E1/0  3. Screws: 14mm screws x 4    Tubes and Drains Placed:  None    Estimated Blood Loss:  15 ml    Blood Product Transfusions:  None    Consent and Supervision:  The risks and benefits of the procedure were discussed at length with the patient who stated understanding and agreed to proceed. Specific risks for this procedure that were discussed preoperatively included infection, bleeding, wound-healing problems, hoarseness, dysphagia, pseudoarthrosis, CSF leak, motor or sensory changes, weakness or paralysis, or even death. I certify that I was present throughout the entire procedure, and that I performed the entire procedure from start to finish. History and Indications for Procedure: The patient is a 65 y.o. y.o. female who presents with over a month of severe right greater than left sided weakness, numbness, and tingling. She states that two months ago she had to start using a walker. She underwent MRI of the cervical spine demonstrated severe compression at C6-C7 from a disc herniation. Of note she takes a daily aspirin. She has a past medical history of schizophrenia and bipolar disorder.  Given her progressive,

## 2023-09-27 NOTE — FLOWSHEET NOTE
Dr. Sands Cuff made aware of BP and HR and said to give 10mg of hydralazine. Pt states pain is tolerable.

## 2023-09-27 NOTE — BRIEF OP NOTE
Brief Postoperative Note      Patient: Tal Morataya  YOB: 1965  MRN: [id number]    Date of Procedure: 9/27/2023    Pre-Op Diagnosis Codes: * Myelopathy concurrent with and due to spinal stenosis of cervical region ([address]) [M48.02, G99.2]    Post-Op Diagnosis: Same       Procedure(s):  CERVICAL 6 - CERVICAL 7 ANTERIOR CERVICAL DECOMPRESSION AND FUSION    Surgeon(s):  Kylie Donohue MD    Assistant:  Surgical Assistant: Edith Rollins    Anesthesia: General    Estimated Blood Loss (mL): Minimal    Complications: None    Specimens:   * No specimens in log *    Implants:  Implant Name Type Inv.  Item Serial No.  Lot No. LRB No. Used Action   ALLOGRAFT BNE 5 CC FIBER PLIAFX PRIM - [id number]  ALLOGRAFT BNE 5 CC FIBER PLIAFX PRIM [id number] [facility] TISSUE [employer] [de-identified] N/A 1 Implanted         Drains: * No LDAs found *    Findings: ACDF for large disc herniation at C6-C7      Electronically signed by Kylie Donohue MD on 9/27/2023 at 1:05 PM

## 2023-09-27 NOTE — PROGRESS NOTES
Pt A&Ox4, VSS on RA. Pt ambulates x1 walker/GB. Pt voiding, NPO. Pt has R sided weakness and decreased sensation. Pt lethargic, easily aroused. No acute neuro changes noted. Managing pain with PRN pain med per [facility]. Fall precautions in place. Bed alarm on and in lowest position. Plan of care continues.

## 2023-09-27 NOTE — PROGRESS NOTES
4 Eyes Skin Assessment     NAME:  Rosaura Deal OF BIRTH:  1965  MEDICAL RECORD NUMBER:  [id number]    The patient is being assessed for  Admission    I agree that at least one RN has performed a thorough Head to Toe Skin Assessment on the patient. ALL assessment sites listed below have been assessed. Areas assessed by both nurses:    Head, Face, Ears, Shoulders, Back, Chest, Arms, Elbows, Hands, Sacrum. Buttock, Coccyx, Ischium, Legs. Feet and Heels, Under Medical Devices , and Other     Abrasion on right shin  Abrasion/ dryness on L elbow  Ecchymosis on  abdomen        Does the Patient have a Wound?  No noted wound(s)       Manuel Prevention initiated by RN: No  Wound Care Orders initiated by RN: No    Pressure Injury (Stage 3,4, Unstageable, DTI, NWPT, and Complex wounds) if present, place Wound referral order by RN under : No    New Ostomies, if present place, Ostomy referral order under : No     Nurse 1 eSignature: Electronically signed by Aiyana Bhardwaj RN on 9/27/23 at 5:19 AM EDT    **SHARE this note so that the co-signing nurse can place an eSignature**    Nurse 2 eSignature: Electronically signed by Moses Benavides RN on 9/27/23 at 5:20 AM EDT

## 2023-09-27 NOTE — PROGRESS NOTES
Patient was seen and examined at bedside this morning prior to her going for surgery.   Resting comfortably in bed; complaining of back pain pain medications given; to the OR for cervical discectomy with decompression for C6/7    H&P was written by hospitalist on this date;  JASON Manzo - CNP  9/27/2023  2:19 PM

## 2023-09-27 NOTE — PROGRESS NOTES
Physical/ Occupational Therapy    Orders received, chart reviewed. Per chart review pt awaiting neuro recommendations and is on strict bedrest. Will follow up once activity orders are updated and as neuro sees appropriate. Addendum:   Per neurosurgery OR today for CERVICAL 6 - CERVICAL 7 ANTERIOR CERVICAL DECOMPRESSION AND FUSION by Dr. Ortiz Esteban. Will follow up post surgery for evaluations and recommendations.     Julianna Barrett, PT, DPT  Tea Mac, MOT, OTR/L

## 2023-09-27 NOTE — PROGRESS NOTES
Patient admitted to PACU # 11 from OR at 1332 post CERVICAL 6 - CERVICAL 7 ANTERIOR CERVICAL DECOMPRESSION AND FUSION per Dr. Oni Barry. Attached to PACU monitoring system and report received from anesthesia provider. Pt arrived to pacu with oral ariway on 10 L nonrebreather mask. Oral airway was removed by CRNA. Pt now on 4 L NC. Neck surgical incision c/d/I with surgical glue. Ice pack applied. Pt ST on monitor. IVF infusing. Pt following commands. R sided weaker than L and is pt baseline. Pt able to move all extremities. Will continue to monitor.

## 2023-09-27 NOTE — PLAN OF CARE
Increase function to baseline.
Problem: Discharge Planning  Goal: Discharge to home or other facility with appropriate resources  Outcome: Progressing     Problem: Pain  Goal: Verbalizes/displays adequate comfort level or baseline comfort level  9/25/2023 0454 by An Calderon RN  Outcome: Progressing  9/24/2023 1824 by Yulissa Stone RN  Outcome: Progressing  Flowsheets (Taken 9/24/2023 1824)  Verbalizes/displays adequate comfort level or baseline comfort level:   Encourage patient to monitor pain and request assistance   Administer analgesics based on type and severity of pain and evaluate response   Assess pain using appropriate pain scale   Implement non-pharmacological measures as appropriate and evaluate response     Problem: Safety - Adult  Goal: Free from fall injury  9/25/2023 0454 by An Calderon RN  Outcome: Progressing  9/24/2023 1824 by Yulissa Stone RN  Outcome: Progressing  Flowsheets (Taken 9/24/2023 1824)  Free From Fall Injury:   Instruct family/caregiver on patient safety   Based on caregiver fall risk screen, instruct family/caregiver to ask for assistance with transferring infant if caregiver noted to have fall risk factors
Problem: Discharge Planning  Goal: Discharge to home or other facility with appropriate resources  Outcome: Progressing     Problem: Pain  Goal: Verbalizes/displays adequate comfort level or baseline comfort level  Outcome: Progressing     Problem: Skin/Tissue Integrity  Goal: Absence of new skin breakdown  Description: 1. Monitor for areas of redness and/or skin breakdown  2. Assess vascular access sites hourly  3. Every 4-6 hours minimum:  Change oxygen saturation probe site  4. Every 4-6 hours:  If on nasal continuous positive airway pressure, respiratory therapy assess nares and determine need for appliance change or resting period.   Outcome: Progressing     Problem: Safety - Adult  Goal: Free from fall injury  Outcome: Progressing     Problem: ABCDS Injury Assessment  Goal: Absence of physical injury  Outcome: Progressing
Problem: Pain  Goal: Verbalizes/displays adequate comfort level or baseline comfort level  Outcome: Progressing     Problem: Safety - Adult  Goal: Free from fall injury  Outcome: Progressing     Problem: ABCDS Injury Assessment  Goal: Absence of physical injury  Outcome: Progressing
Problem: Pain  Goal: Verbalizes/displays adequate comfort level or baseline comfort level  Outcome: Progressing  Flowsheets (Taken 9/24/2023 1824)  Verbalizes/displays adequate comfort level or baseline comfort level:   Encourage patient to monitor pain and request assistance   Administer analgesics based on type and severity of pain and evaluate response   Assess pain using appropriate pain scale   Implement non-pharmacological measures as appropriate and evaluate response     Problem: Safety - Adult  Goal: Free from fall injury  Outcome: Progressing  Flowsheets (Taken 9/24/2023 1824)  Free From Fall Injury:   Instruct family/caregiver on patient safety   Based on caregiver fall risk screen, instruct family/caregiver to ask for assistance with transferring infant if caregiver noted to have fall risk factors
Pt ok for discharge per MD. Discharge instructions and script given. Report called to [facility] for transfer to  bed 1 room 5514. IV removed during AM shift. No questions or concerns at this time. All pt belonging including phone, wallet, and pt personal rollator walker packed for transport.  Pt aware and awaiting transport, scheduled for 10:30pm.
Reviewed plan of care including DC barriers.
TODAY'S DATE:  9/24/2023      Current NIHSS 4      Discussed personal risk factors for Stroke/TIA with patient/family, and ways to reduce the risk for a recurrent stroke. Patient's personal risk factors which were identified are:   []   Alcohol Abuse: check with your physician before any alcohol consumption. []   Atrial fibrillation: may cause blood clots  []   Drug Abuse: Seek help, talk with your doctor  []   Clotting Disorder  []   Diabetes  [x]   Family history of stroke or heart disease  [x]   High Blood Pressure/Hypertension: work with your physician  []   High cholesterol: monitor cholesterol levels with your physician  [x]   Overweight/Obesity: work with your physician for your ideal body weight  [x]   Physical Inactivity: get regular exercise as directed by your physician  [x]   Personal history of previous TIA or stroke  [x]   Poor Diet: decrease salt (sodium) in your diet, follow diet directed by physician  []   Smoking: stop smoking      Reviewed the Following Education with Patient and/or Family:   - Signs and Symptoms of Stroke  - Personal risk factors   - How to activate EMS (911)   - Importance of Follow Up Appointments at Discharge   - Importance of Compliance with Medications Prescribed at Discharge  - Available community resources and stroke advocacy groups if needed    Patient and/or family member: verbalized understanding. Stroke Education booklet given to patient/family (or verified, if given already), which reviews above information.  yes         Electronically signed by An Calderon RN on 9/24/2023 at 7:22 AM
TODAY'S DATE:  9/25/2023      Current NIHSS 4      Discussed personal risk factors for Stroke/TIA with patient/family, and ways to reduce the risk for a recurrent stroke. Patient's personal risk factors which were identified are:   []   Alcohol Abuse: check with your physician before any alcohol consumption. []   Atrial fibrillation: may cause blood clots  []   Drug Abuse: Seek help, talk with your doctor  []   Clotting Disorder  []   Diabetes  [x]   Family history of stroke or heart disease  [x]   High Blood Pressure/Hypertension: work with your physician  [x]   High cholesterol: monitor cholesterol levels with your physician  []   Overweight/Obesity: work with your physician for your ideal body weight  [x]   Physical Inactivity: get regular exercise as directed by your physician  [x]   Personal history of previous TIA or stroke  [x]   Poor Diet: decrease salt (sodium) in your diet, follow diet directed by physician  []   Smoking: stop smoking      Reviewed the Following Education with Patient and/or Family:   - Signs and Symptoms of Stroke  - Personal risk factors   - How to activate EMS (911)   - Importance of Follow Up Appointments at Discharge   - Importance of Compliance with Medications Prescribed at Discharge  - Available community resources and stroke advocacy groups if needed    Patient and/or family member: verbalized understanding. Stroke Education booklet given to patient/family (or verified, if given already), which reviews above information.  yes         Electronically signed by Erum Thorne RN on 9/25/2023 at 6:24 AM
UE There ex, Bed mobility, Transfers, ADL training, Adaptative equipment training, Therapeutic activity, Neuromuscular re-education, Patient education
Severe lateral recess stenosis bilaterally. Mild neural foraminal stenosis bilaterally. At L5-S1 level, minimal disc disease but mild-to-moderate facet osteoarthritis. No central stenosis. Mild left lateral recess stenosis. Mild stenosis of medial portion of neural foramina bilaterally. In the rest of lumbar spine there are multilevel spondylotic changes but without any significant stenosis. Assessment:  Patient is a 65 y.o. y/o female w/right sided weakness. MRI Cervical shows C6-C7 level, moderate to marked degenerative disc disease. There is large broad-based posterior central disc herniation, mildly skewed to the right. Moderate to marked compression of the ventral aspect of spinal cord with evidence of significant myelomalacia in the compressed cervical spinal cord at this level. Severe central stenosis. Plan for C6-7 ACDF tomorrow afternoon. Recommendations:  Neurologic exams frequency:  - Floor: Q4H  For change in exam MUST contact neurosurgery team  STAT BMP, CBC, PT/INR, APTT, Type & Screen upon arrival at [address] [place] [address] Blood Thinners  Medical Clearance for Surgery  NPO  Detailed consult when patient arrives. DISPO: Transfer to [facility] with hospitalist as attending physician and Dr. Abhishek Damian as consulting neurosurgeon.      Electronically signed by: JASON Poole CNP, APRN-CNP, 9/26/2023 1:56 PM  [phone]

## 2023-09-27 NOTE — PROGRESS NOTES
cord, in the intrathecal space, in the epidural space or in the osseous structures. MRI LUMBAR SPINE W WO CONTRAST  Result Date: 9/26/2023  At L4-L5 level, grade 1 anterolisthesis due to moderate DDD and advanced facet osteoarthritis without pars defect. Significant somewhat heterogeneous enhancement surrounding the facet joints bilaterally, suggestive of active osteoarthritic process with probable instability around the facet joints at this level. Posterior significant disc bulge with small posterior central disc protrusion. Severe central stenosis. AP diameter of thecal sac at midline being 6.8 mm. Severe lateral recess stenosis bilaterally. Mild neural foraminal stenosis bilaterally. At L5-S1 level, minimal disc disease but mild-to-moderate facet osteoarthritis. No central stenosis. Mild left lateral recess stenosis. Mild stenosis of medial portion of neural foramina bilaterally. In the rest of lumbar spine there are multilevel spondylotic changes but without any significant stenosis. Labs:  No results for input(s): \"WBC\", \"HGB\", \"HCT\", \"PLT\" in the last 72 hours. Recent Labs     09/27/23  0736      K 4.5      CO2 27   BUN 14   CREATININE 0.9   GLUCOSE 108*   CALCIUM 10.0       No results for input(s): \"PROTIME\", \"INR\", \"APTT\" in the last 72 hours.     Patient Active Problem List    Diagnosis Date Noted    Degenerative disc disease, cervical 09/27/2023    Cervical spinal cord compression ([address]) 09/26/2023    Right sided weakness 09/24/2023    COPD (chronic obstructive pulmonary disease) ([address])     Closed left ankle fracture, initial encounter 02/12/2021    GERD (gastroesophageal reflux disease) 10/27/2017    Primary osteoarthritis of left knee 09/21/2017    Obsessive-compulsive disorder 11/10/2016    Allergic rhinitis 03/30/2016    Sleep apnea 03/30/2016    Obesity 03/30/2016    History of tobacco use 03/30/2016    Club nail 03/30/2016    Chest pain 03/18/2016    Fibromyalgia 07/16/2010    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 07/16/2010    Bipolar disorder ([address]) 03/21/2010    Schizophrenia ([address]) 03/21/2010    Asthma     Irritable bowel syndrome     Hyperlipidemia        Assessment:  Patient is a 65 y.o. y/o female w/right sided weakness. MRI Cervical shows C6-C7 level, moderate to marked degenerative disc disease. There is large broad-based posterior central disc herniation, mildly skewed to the right. Moderate to marked compression of the ventral aspect of spinal cord with evidence of significant myelomalacia in the compressed cervical spinal cord at this level. Severe central stenosis. Plan for C6-7 ACDF tomorrow afternoon. Plan:  No emergent neurosurgical intervention indicated  Neurologic exams frequency: Q4H  For change in exam MUST contact neurosurgery team along with critical care or primary team  Cervical Stenosis:  - OR today for CERVICAL 6 - CERVICAL 7 ANTERIOR CERVICAL DECOMPRESSION AND FUSION by Dr. Rupa Kramer  - STAT PT/INR, APTT, Type & Screen, and Pregnancy test  - Hold ALL Blood Thinners  - Medical Clearance for Surgery  NPO  Will follow inpatient. Please call with any questions or decline in neurological status    DISPO: Remain inpatient from neurosurgery standpoint. Dispo timing to be determined by primary team once patient is medically stable for discharge. Patient was seen and examined with Dr. iVola Thomas who agrees with above assessment and plan.      Electronically signed by: JASON Mott CNP, APRN-CNP, 9/27/2023 9:45 AM  [phone]

## 2023-09-27 NOTE — PROGRESS NOTES
Pt arrived to room 5514 via stretcher. Pt A&Ox4, VSS. Pt lethargic, easily aroused. Pt has right sided weakness, numbness, and tingling. Hospitalist notified of pt arrival. Pt oriented to room. Standard safety measures in place.

## 2023-09-27 NOTE — FLOWSHEET NOTE
Massimo RENEE came down for report. Axillary temperature taken d/t pt eating ice chips and was 99.0.

## 2023-09-27 NOTE — PLAN OF CARE
Problem: Safety - Adult  Goal: Free from fall injury  Outcome: Progressing  Note: Fall precautions in place. Bed alarm on and in lowest position. Call light, belongings, bedside table within reach. Problem: Musculoskeletal - Adult  Goal: Return mobility to safest level of function  Outcome: Progressing  Note: Pt ambulating x1 walker/ GB. Pt has R sided weakness, with decreased sensation.

## 2023-09-28 ENCOUNTER — APPOINTMENT (OUTPATIENT)
Dept: GENERAL RADIOLOGY | Age: 58
End: 2023-09-28
Attending: STUDENT IN AN ORGANIZED HEALTH CARE EDUCATION/TRAINING PROGRAM
Payer: MEDICARE

## 2023-09-28 VITALS
HEIGHT: 64 IN | RESPIRATION RATE: 17 BRPM | DIASTOLIC BLOOD PRESSURE: 97 MMHG | BODY MASS INDEX: 34.29 KG/M2 | OXYGEN SATURATION: 96 % | HEART RATE: 103 BPM | SYSTOLIC BLOOD PRESSURE: 161 MMHG | WEIGHT: 200.84 LBS | TEMPERATURE: 97.8 F

## 2023-09-28 PROBLEM — Z98.1 S/P CERVICAL SPINAL FUSION: Status: ACTIVE | Noted: 2023-09-28

## 2023-09-28 LAB
ALBUMIN SERPL-MCNC: 4.2 G/DL (ref 3.4–5)
ALBUMIN/GLOB SERPL: 1.7 {RATIO} (ref 1.1–2.2)
ALP SERPL-CCNC: 87 U/L (ref 40–129)
ALT SERPL-CCNC: 14 U/L (ref 10–40)
ANION GAP SERPL CALCULATED.3IONS-SCNC: 13 MMOL/L (ref 3–16)
AST SERPL-CCNC: 19 U/L (ref 15–37)
BASOPHILS # BLD: 0.1 K/UL (ref 0–0.2)
BASOPHILS NFR BLD: 0.7 %
BILIRUB SERPL-MCNC: <0.2 MG/DL (ref 0–1)
BUN SERPL-MCNC: 17 MG/DL (ref 7–20)
CALCIUM SERPL-MCNC: 10.1 MG/DL (ref 8.3–10.6)
CHLORIDE SERPL-SCNC: 102 MMOL/L (ref 99–110)
CO2 SERPL-SCNC: 24 MMOL/L (ref 21–32)
CREAT SERPL-MCNC: 0.8 MG/DL (ref 0.6–1.1)
DEPRECATED RDW RBC AUTO: 13 % (ref 12.4–15.4)
EOSINOPHIL # BLD: 0 K/UL (ref 0–0.6)
EOSINOPHIL NFR BLD: 0 %
GFR SERPLBLD CREATININE-BSD FMLA CKD-EPI: >60 ML/MIN/{1.73_M2}
GLUCOSE SERPL-MCNC: 139 MG/DL (ref 70–99)
HCT VFR BLD AUTO: 36.7 % (ref 36–48)
HGB BLD-MCNC: 12.6 G/DL (ref 12–16)
LYMPHOCYTES # BLD: 1.7 K/UL (ref 1–5.1)
LYMPHOCYTES NFR BLD: 8.7 %
MCH RBC QN AUTO: 31.8 PG (ref 26–34)
MCHC RBC AUTO-ENTMCNC: 34.4 G/DL (ref 31–36)
MCV RBC AUTO: 92.5 FL (ref 80–100)
MONOCYTES # BLD: 1.2 K/UL (ref 0–1.3)
MONOCYTES NFR BLD: 6.2 %
NEUTROPHILS # BLD: 16.6 K/UL (ref 1.7–7.7)
NEUTROPHILS NFR BLD: 84.4 %
PLATELET # BLD AUTO: 340 K/UL (ref 135–450)
PMV BLD AUTO: 7.7 FL (ref 5–10.5)
POTASSIUM SERPL-SCNC: 4.2 MMOL/L (ref 3.5–5.1)
PROT SERPL-MCNC: 6.7 G/DL (ref 6.4–8.2)
RBC # BLD AUTO: 3.96 M/UL (ref 4–5.2)
SODIUM SERPL-SCNC: 139 MMOL/L (ref 136–145)
WBC # BLD AUTO: 19.7 K/UL (ref 4–11)

## 2023-09-28 PROCEDURE — 80053 COMPREHEN METABOLIC PANEL: CPT

## 2023-09-28 PROCEDURE — 97162 PT EVAL MOD COMPLEX 30 MIN: CPT

## 2023-09-28 PROCEDURE — 6370000000 HC RX 637 (ALT 250 FOR IP): Performed by: STUDENT IN AN ORGANIZED HEALTH CARE EDUCATION/TRAINING PROGRAM

## 2023-09-28 PROCEDURE — 36415 COLL VENOUS BLD VENIPUNCTURE: CPT

## 2023-09-28 PROCEDURE — 6370000000 HC RX 637 (ALT 250 FOR IP): Performed by: FAMILY MEDICINE

## 2023-09-28 PROCEDURE — 97535 SELF CARE MNGMENT TRAINING: CPT

## 2023-09-28 PROCEDURE — 97116 GAIT TRAINING THERAPY: CPT

## 2023-09-28 PROCEDURE — 6360000002 HC RX W HCPCS: Performed by: STUDENT IN AN ORGANIZED HEALTH CARE EDUCATION/TRAINING PROGRAM

## 2023-09-28 PROCEDURE — 72040 X-RAY EXAM NECK SPINE 2-3 VW: CPT

## 2023-09-28 PROCEDURE — 97166 OT EVAL MOD COMPLEX 45 MIN: CPT

## 2023-09-28 PROCEDURE — 97530 THERAPEUTIC ACTIVITIES: CPT

## 2023-09-28 PROCEDURE — 2580000003 HC RX 258: Performed by: STUDENT IN AN ORGANIZED HEALTH CARE EDUCATION/TRAINING PROGRAM

## 2023-09-28 PROCEDURE — 2580000003 HC RX 258: Performed by: FAMILY MEDICINE

## 2023-09-28 PROCEDURE — 85025 COMPLETE CBC W/AUTO DIFF WBC: CPT

## 2023-09-28 RX ORDER — OXYCODONE HYDROCHLORIDE 5 MG/1
5 TABLET ORAL EVERY 6 HOURS PRN
Qty: 28 TABLET | Refills: 0 | Status: SHIPPED | OUTPATIENT
Start: 2023-09-28 | End: 2023-10-05

## 2023-09-28 RX ORDER — HYDROMORPHONE HYDROCHLORIDE 1 MG/ML
0.5 INJECTION, SOLUTION INTRAMUSCULAR; INTRAVENOUS; SUBCUTANEOUS ONCE
Status: DISCONTINUED | OUTPATIENT
Start: 2023-09-28 | End: 2023-09-28 | Stop reason: HOSPADM

## 2023-09-28 RX ORDER — CYCLOBENZAPRINE HCL 10 MG
10 TABLET ORAL EVERY 12 HOURS PRN
Qty: 10 TABLET | Refills: 0 | Status: SHIPPED | OUTPATIENT
Start: 2023-09-28 | End: 2023-10-08

## 2023-09-28 RX ADMIN — HYDROMORPHONE HYDROCHLORIDE 0.25 MG: 1 INJECTION, SOLUTION INTRAMUSCULAR; INTRAVENOUS; SUBCUTANEOUS at 07:02

## 2023-09-28 RX ADMIN — CYCLOBENZAPRINE 10 MG: 10 TABLET, FILM COATED ORAL at 09:50

## 2023-09-28 RX ADMIN — LAMOTRIGINE 100 MG: 100 TABLET ORAL at 09:50

## 2023-09-28 RX ADMIN — SODIUM CHLORIDE, PRESERVATIVE FREE 10 ML: 5 INJECTION INTRAVENOUS at 10:01

## 2023-09-28 RX ADMIN — ACETAMINOPHEN 650 MG: 325 TABLET ORAL at 07:02

## 2023-09-28 RX ADMIN — ACETAMINOPHEN 650 MG: 325 TABLET ORAL at 12:39

## 2023-09-28 RX ADMIN — MILNACIPRAN HYDROCHLORIDE 25 MG: 50 TABLET, FILM COATED ORAL at 09:57

## 2023-09-28 RX ADMIN — POLYETHYLENE GLYCOL 3350 17 G: 17 POWDER, FOR SOLUTION ORAL at 09:50

## 2023-09-28 RX ADMIN — CEFAZOLIN 2000 MG: 2 INJECTION, POWDER, FOR SOLUTION INTRAMUSCULAR; INTRAVENOUS at 10:00

## 2023-09-28 RX ADMIN — OXYCODONE HYDROCHLORIDE 5 MG: 5 TABLET ORAL at 03:08

## 2023-09-28 RX ADMIN — CEFAZOLIN 2000 MG: 2 INJECTION, POWDER, FOR SOLUTION INTRAMUSCULAR; INTRAVENOUS at 02:25

## 2023-09-28 RX ADMIN — SERTRALINE HYDROCHLORIDE 100 MG: 100 TABLET ORAL at 09:50

## 2023-09-28 RX ADMIN — BUPROPION HYDROCHLORIDE 75 MG: 75 TABLET, FILM COATED ORAL at 09:57

## 2023-09-28 ASSESSMENT — PAIN - FUNCTIONAL ASSESSMENT
PAIN_FUNCTIONAL_ASSESSMENT: ACTIVITIES ARE NOT PREVENTED

## 2023-09-28 ASSESSMENT — PAIN SCALES - GENERAL
PAINLEVEL_OUTOF10: 3
PAINLEVEL_OUTOF10: 7
PAINLEVEL_OUTOF10: 10
PAINLEVEL_OUTOF10: 2
PAINLEVEL_OUTOF10: 8
PAINLEVEL_OUTOF10: 8

## 2023-09-28 ASSESSMENT — PAIN DESCRIPTION - ONSET
ONSET: ON-GOING

## 2023-09-28 ASSESSMENT — PAIN DESCRIPTION - FREQUENCY
FREQUENCY: CONTINUOUS

## 2023-09-28 ASSESSMENT — PAIN DESCRIPTION - DESCRIPTORS
DESCRIPTORS: SHARP
DESCRIPTORS: SHARP
DESCRIPTORS: ACHING

## 2023-09-28 ASSESSMENT — PAIN DESCRIPTION - LOCATION
LOCATION: NECK

## 2023-09-28 ASSESSMENT — PAIN DESCRIPTION - ORIENTATION
ORIENTATION: RIGHT

## 2023-09-28 ASSESSMENT — PAIN DESCRIPTION - PAIN TYPE
TYPE: SURGICAL PAIN

## 2023-09-28 NOTE — PLAN OF CARE
Problem: Safety - Adult  Goal: Free from fall injury  Outcome: Progressing  Note: Fall precautions in place. Bed alarm on and in lowest position. Call light, belongings, bedside table within reach. Problem: Pain  Goal: Verbalizes/displays adequate comfort level or baseline comfort level  Outcome: Progressing  Note: Assessing pain using numeric pain scale. Managing pain with meds per MAR. Using repositioining and pillow support for comfort.

## 2023-09-28 NOTE — PROGRESS NOTES
Physical Therapy  Facility/Department: [facility] 5T ORTHO/NEURO  Physical Therapy Initial Assessment and Treatment    Name: Rita Gardner  : 1965  MRN: [id number]  Date of Service: 2023    Discharge Recommendations:    Rita Gardner scored a 20/24 on the AM-PAC short mobility form. Current research shows that an AM-PAC score of 18 or greater is typically associated with a discharge to the patient's home setting. Based on the patient's AM-PAC score and their current functional mobility deficits, it is recommended that the patient have 2-3 sessions per week of Physical Therapy at d/c to increase the patient's independence. At this time, this patient demonstrates the endurance and safety to discharge home with home vs OP services and a follow up treatment frequency of 2-3x/wk. Please see assessment section for further patient specific details. If patient discharges prior to next session this note will serve as a discharge summary. Please see below for the latest assessment towards goals. PT Equipment Recommendations  Equipment Needed: No      Patient Diagnosis(es): There were no encounter diagnoses. Past Medical History:  has a past medical history of Asthma, Bipolar disorder, Carpal tunnel syndrome, COPD (chronic obstructive pulmonary disease) ([address]), Fibromyalgia, GERD (gastroesophageal reflux disease), Hyperlipidemia, Irritable bowel syndrome, Manic depression ([address]), PONV (postoperative nausea and vomiting), PTSD (post-traumatic stress disorder), and Schizophrenia. Past Surgical History:  has a past surgical history that includes Tubal ligation; Ovarian cyst surgery; Tonsillectomy and adenoidectomy; hip surgery (Left); Carpal tunnel release (Bilateral); back surgery; Knee Arthroplasty (Right, 2016); Breast biopsy (Right); Knee arthroscopy (Left); Knee Arthroplasty (Right, 2017); Appendectomy; Knee Arthrocentesis (Left, 10/27/2017);  Ankle fracture surgery (Left, 2021); and

## 2023-09-28 NOTE — PROGRESS NOTES
Pt to d/c home with family. 1 PIV removed. D/c packet fully reviewed with emphasis on s/s of infection and infection prevention, incision care, f/u appointments, and medications. All questions and concerns addressed at this time. All belongings with Pt.

## 2023-09-28 NOTE — PROGRESS NOTES
Pt A&Ox4, VSS on RA. Pt ambulates x1 walker/GB. Pt voiding and tolerating PO fluids. Pt has R sided weakness, with increasing sensation. No acute neuro changes noted. Managing pain with PRN pain med per [facility]. Fall precautions in place. Bed alarm on and in lowest position.  Plan of care continues

## 2023-09-28 NOTE — PROGRESS NOTES
Spouse (yari, her significant other and )  Type of Home: Apartment  Home Layout: One level  Home Access: Stairs to enter with rails (5 EVA)  Entrance Stairs - Rails: Both  Bathroom Shower/Tub: Tub/Shower unit  Bathroom Toilet: Handicap height (vanity in front)  Bathroom Equipment: Shower chair, Grab bars in shower  Home Equipment: Rollator  Has the patient had two or more falls in the past year or any fall with injury in the past year?: Yes (4-5 falls in last month and a half. \"If I stood too long, I would start sinking down. \")  ADL Assistance: Independent  Ambulation Assistance: Independent (with rollator)  Transfer Assistance: Independent  Active : No (insurance transportation)  Occupation: On disability  Leisure & Hobbies: play with dogs  Additional Comments: Planning on moving soon to trailer with 4 EVA. Will have 24 hr assist initially       Objective   Pulse: (!) 103  Heart Rate Source: Monitor  BP: (!) 161/97  BP Location: Left upper arm  BP Method: Automatic  Patient Position: Semi fowlers  MAP (Calculated): 118  Respirations: 17  SpO2: 96 %  O2 Device: None (Room air)             Safety Devices  Type of Devices: Call light within reach; Chair alarm in place; Left in chair;Nurse notified  Restraints  Restraints Initially in Place: No  Balance  Sitting: Intact  Standing: With support (use of rollator- SBA)  Gait  Overall Level of Assistance: Stand-by assistance;Assist X1  Interventions: Safety awareness training;Verbal cues  Distance (ft):  (to/from bathroom; lap in hallway)  Assistive Device: Elcee [place], rollator;Gait belt  Wheelchair Management  Wheelchair Management: No  Toilet Transfers  Toilet - Technique: Ambulating  Equipment Used: Standard toilet  Toilet Transfer: Stand by assistance  AROM: Within functional limits  Strength: Generally decreased, functional (generalized weakness- 4/5  strength in R hand but pt reports that prior to surgery she wasn't even able to use R hand so is very

## 2023-09-28 NOTE — PROGRESS NOTES
Pt is alert and oriented x4. Pt required PRN pain medication once on the floor and out of surgery. Pt has voided twice since surgery. Incision is LUCY and CDI.

## 2023-09-29 ENCOUNTER — CARE COORDINATION (OUTPATIENT)
Dept: CASE MANAGEMENT | Age: 58
End: 2023-09-29

## 2023-09-29 LAB
EKG ATRIAL RATE: 117 BPM
EKG DIAGNOSIS: NORMAL
EKG P AXIS: 67 DEGREES
EKG P-R INTERVAL: 180 MS
EKG Q-T INTERVAL: 326 MS
EKG QRS DURATION: 84 MS
EKG QTC CALCULATION (BAZETT): 454 MS
EKG R AXIS: -23 DEGREES
EKG T AXIS: 70 DEGREES
EKG VENTRICULAR RATE: 117 BPM

## 2023-09-29 PROCEDURE — 93010 ELECTROCARDIOGRAM REPORT: CPT | Performed by: INTERNAL MEDICINE

## 2023-09-29 NOTE — CARE COORDINATION
[facility] Transitions Initial Follow Up Call    Call within 2 business days of discharge: Yes    Patient:  Ryan Thompson   Patient :  1965  MRN:  [id number]     Reason for Admission: S/P Cervical Spinal Fusion  Discharge Date:  23    RARS:       Transitions of Care Initial Call    Was this an external facility discharge? Discharge Facility: [address] [place] [address] to be reviewed by the provider   Additional needs identified to be addressed with provider:    no    AMB CC Provider Discharge Needs: none            Non-face-to-face services provided:    1ST CTC attempt to reach Pt regarding recent hospital discharge. CTC left voice recording with call back number requesting a call back. Follow up appointments:    No future appointments.     Thank Ivania Levy RN  Care Transition Coordinator  Contact [facility]:[phone]

## 2023-10-02 ENCOUNTER — CARE COORDINATION (OUTPATIENT)
Dept: CASE MANAGEMENT | Age: 58
End: 2023-10-02

## 2023-10-02 NOTE — CARE COORDINATION
[facility] Transitions Initial Follow Up Call    Call within 2 business days of discharge: Yes    Patient:  Marcela Garcia   Patient :  1965  MRN:  [id number]     Reason for Admission: S/P Cervical Spinal Fusion  Discharge Date:  23    RARS:       Transitions of Care Initial Call    Was this an external facility discharge? Discharge Facility: [address] [place] [address] to be reviewed by the provider   Additional needs identified to be addressed with provider:    no    AMB CC Provider Discharge Needs: none            Non-face-to-face services provided:    2ND CTC attempt to reach Pt regarding recent hospital discharge. CTC left voice recording with call back number requesting a call back. CTN will close out CTN episode at this time. Follow up appointments:    No future appointments.     Thank Jesus Albright RN  Care Transition Coordinator  Contact [facility]:[phone]

## 2023-11-07 ENCOUNTER — COMMUNITY OUTREACH (OUTPATIENT)
Dept: FAMILY MEDICINE CLINIC | Age: 58
End: 2023-11-07

## 2023-11-07 NOTE — PROGRESS NOTES
Care Everywhere audit shows patient had HbA1c done 8/27/2023. [facility] has been updated.

## 2025-05-13 NOTE — TELEPHONE ENCOUNTER
ProAir is no longer made and needs replaced with something covered. Albuterol hfa is covered.   Future appt scheduled 12/6  Last appt 6/21 room air

## (undated) DEVICE — SUTURE PERMAHAND SZ 2-0 L12X18IN NONABSORBABLE BLK SILK A185H

## (undated) DEVICE — Z CONVERTED USE 2273232 BANDAGE COMPR W6INXL11YD E KNIT DBL SELF CLSR EZE-BAND

## (undated) DEVICE — SHEET,T,THYROID,STERILE: Brand: MEDLINE

## (undated) DEVICE — COUNTER NDL 40 COUNT HLD 70 NUM FOAM BLK SGL MAG W BLDE REMV

## (undated) DEVICE — SUTURE VCRL SZ 0 L18IN ABSRB UD L36MM CT-1 1/2 CIR J840D

## (undated) DEVICE — GLOVE SURG SZ 75 L12IN FNGR THK94MIL TRNSLUC YEL LTX

## (undated) DEVICE — UNDERGLOVE SURG SZ 8 BLU LTX FREE SYN POLYISOPRENE POLYMER

## (undated) DEVICE — BLANKET WRM W40.2XL55.9IN IORT LO BODY + MISTRAL AIR

## (undated) DEVICE — GLOVE SURG SZ 65 THK91MIL LTX FREE SYN POLYISOPRENE

## (undated) DEVICE — TIP SRGCL 2MM DIA STNLSS STEEL SHARP KRRSN SNGLE USE

## (undated) DEVICE — CURITY ABDOMINAL PAD: Brand: CURITY

## (undated) DEVICE — SYMMETRY SHARP KERRISON® RONGEUR TIPS, THIN FOOTPLATE, 1 MM TIP, SINGLE USE, (3/BX): Brand: SYMMETRY SHARP KERRISON®

## (undated) DEVICE — WRAP COMPR W1XL180IN BLU LTWT SELF ADH ATH COMFORTABLE COBAN

## (undated) DEVICE — NEURO SPONGES: Brand: DEROYAL

## (undated) DEVICE — SOLUTION,SALINE,IRRGATION,500ML,STRL: Brand: MEDLINE

## (undated) DEVICE — GLOVE SURG SZ 6 L12IN FNGR THK75MIL WHT LTX POLYMER BEAD

## (undated) DEVICE — GLOVE SURG SZ 65 CRM LTX FREE POLYISOPRENE POLYMER BEAD ANTI

## (undated) DEVICE — LIQUIBAND RAPID ADHESIVE 36/CS 0.8ML: Brand: MEDLINE

## (undated) DEVICE — STAPLER SKIN LEG L3.9MM DIA0.53MM WIDE ROT HD FOR WND CLSR

## (undated) DEVICE — APPLICATOR MEDICATED 10.5 CC SOLUTION HI LT ORNG CHLORAPREP

## (undated) DEVICE — BANDAGE ACE 4X5YDNS

## (undated) DEVICE — PACK EXTREMITY XR

## (undated) DEVICE — SPONGE,PEANUT,XRAY,ST,SM,3/8",5/CARD: Brand: MEDLINE INDUSTRIES, INC.

## (undated) DEVICE — PADDING CAST W4INXL4YD NONSTERILE COT RAYON MICROPLEATED

## (undated) DEVICE — TOOL MR8-14MH30 MR8 14CM MATCH 3MM: Brand: MIDAS REX MR8

## (undated) DEVICE — CUFF RESTRN WRST OR ANK 45FT AD FOAM

## (undated) DEVICE — STERILE LATEX POWDER-FREE SURGICAL GLOVESWITH NITRILE COATING: Brand: PROTEXIS

## (undated) DEVICE — PADDING CAST N ADH 12X6 IN CRIMPED FINISH 100% COTTON WBRLII

## (undated) DEVICE — DRAPE MICSCP W54XL150IN W/ 4 BINOC GLS LENS LEICA

## (undated) DEVICE — PROTECTOR ULN NRV PUR FOAM HK LOOP STRP ANATOMICALLY

## (undated) DEVICE — LAMINECTOMY: Brand: MEDLINE INDUSTRIES, INC.

## (undated) DEVICE — SUTURE MCRYL + SZ 4-0 L27IN ABSRB UD L19MM PS-2 3/8 CIR MCP426H

## (undated) DEVICE — SUTURE VCRL + SZ 3-0 L18IN ABSRB UD SH 1/2 CIR TAPERCUT NDL VCP864D

## (undated) DEVICE — SOLUTION IV 1000ML 0.9% SOD CHL

## (undated) DEVICE — GLOVE SURG SZ 7 L12IN FNGR THK94MIL STD WHT ISOLEX LTX FREE

## (undated) DEVICE — SUTURE VCRL SZ 2-0 L18IN ABSRB UD CT-1 L36MM 1/2 CIR J839D

## (undated) DEVICE — COVER LT HNDL CAM BLU DISP W/ SURG CTRL

## (undated) DEVICE — ROYAL SILK SURGICAL GOWN, XL: Brand: CONVERTORS

## (undated) DEVICE — EYE PROTECTOR FOAM MEDICHOICE

## (undated) DEVICE — SPONGE GZ W4XL4IN COT 12 PLY TYP VII WVN C FLD DSGN STERILE

## (undated) DEVICE — TOWEL,STOP FLAG GOLD N-W: Brand: MEDLINE

## (undated) DEVICE — GLOVE SURG SZ 65 L12IN FNGR THK79MIL GRN LTX FREE

## (undated) DEVICE — COVER C ARM MINI